# Patient Record
Sex: FEMALE | Race: WHITE | Employment: FULL TIME | URBAN - METROPOLITAN AREA
[De-identification: names, ages, dates, MRNs, and addresses within clinical notes are randomized per-mention and may not be internally consistent; named-entity substitution may affect disease eponyms.]

---

## 2018-04-23 ENCOUNTER — TELEPHONE (OUTPATIENT)
Dept: PAIN MANAGEMENT | Age: 48
End: 2018-04-23

## 2018-07-31 ENCOUNTER — TELEPHONE (OUTPATIENT)
Dept: PAIN MANAGEMENT | Age: 48
End: 2018-07-31

## 2018-07-31 NOTE — TELEPHONE ENCOUNTER
This patients referral has been approved for scheduling with [facility].  First message was left for the patient to call [facility] regarding their referral.

## 2018-08-06 NOTE — TELEPHONE ENCOUNTER
New appointment has been scheduled. Address was confirmed and a new patient packet along with appt reminder card was mailed to the patient. Informed patient the first appointment may only be rescheduled once and no showing or giving less than 24 hour notice of inability to come to first office visit will result in a cancellation of the referral. Advised that if they arrive to the appt without completed paperwork, their appointment may be rescheduled for the next available new patient spot. Also advised the pt to prepare to be in the office for at least 2-3 hours and that once established with one of our 2 providers, they may not switch to the other.

## 2018-09-13 ENCOUNTER — OFFICE VISIT (OUTPATIENT)
Dept: PAIN MANAGEMENT | Age: 48
End: 2018-09-13

## 2018-09-13 ENCOUNTER — HOSPITAL ENCOUNTER (OUTPATIENT)
Age: 48
Discharge: HOME | End: 2018-09-13
Payer: COMMERCIAL

## 2018-09-13 ENCOUNTER — HOSPITAL ENCOUNTER (OUTPATIENT)
Dept: GENERAL RADIOLOGY | Age: 48
Discharge: HOME | End: 2018-09-13
Payer: COMMERCIAL

## 2018-09-13 VITALS
SYSTOLIC BLOOD PRESSURE: 115 MMHG | BODY MASS INDEX: 36.73 KG/M2 | HEART RATE: 91 BPM | WEIGHT: 234 LBS | HEIGHT: 67 IN | DIASTOLIC BLOOD PRESSURE: 76 MMHG

## 2018-09-13 DIAGNOSIS — G89.4 CHRONIC PAIN SYNDROME: ICD-10-CM

## 2018-09-13 DIAGNOSIS — G89.29 ENCOUNTER FOR CHRONIC PAIN MANAGEMENT: ICD-10-CM

## 2018-09-13 DIAGNOSIS — M79.18 MYOFASCIAL PAIN: ICD-10-CM

## 2018-09-13 DIAGNOSIS — M96.1 FAILED BACK SURGICAL SYNDROME: ICD-10-CM

## 2018-09-13 PROCEDURE — 99244 OFF/OP CNSLTJ NEW/EST MOD 40: CPT | Performed by: INTERNAL MEDICINE

## 2018-09-13 PROCEDURE — 73502 X-RAY EXAM HIP UNI 2-3 VIEWS: CPT

## 2018-09-13 PROCEDURE — 73562 X-RAY EXAM OF KNEE 3: CPT

## 2018-09-13 RX ORDER — MELOXICAM 15 MG/1
15 TABLET ORAL DAILY
Qty: 30 TABLET | Refills: 0 | Status: SHIPPED | OUTPATIENT
Start: 2018-09-13 | End: 2018-09-13 | Stop reason: SDUPTHER

## 2018-09-13 RX ORDER — MELOXICAM 15 MG/1
15 TABLET ORAL DAILY
Qty: 30 TABLET | Refills: 0 | Status: SHIPPED | OUTPATIENT
Start: 2018-09-13 | End: 2018-10-07 | Stop reason: SDUPTHER

## 2018-09-13 RX ORDER — MELOXICAM 15 MG/1
15 TABLET ORAL DAILY
Qty: 30 TABLET | Refills: 0 | Status: SHIPPED | OUTPATIENT
Start: 2018-09-13 | End: 2018-09-13

## 2018-09-13 RX ORDER — NORTRIPTYLINE HYDROCHLORIDE 25 MG/1
25-50 CAPSULE ORAL NIGHTLY
Qty: 60 CAPSULE | Refills: 0 | Status: SHIPPED | OUTPATIENT
Start: 2018-09-13 | End: 2018-10-09 | Stop reason: SDUPTHER

## 2018-09-13 RX ORDER — TRAMADOL HYDROCHLORIDE 50 MG/1
50 TABLET ORAL EVERY 6 HOURS PRN
Qty: 50 TABLET | Refills: 0 | Status: SHIPPED | OUTPATIENT
Start: 2018-09-13 | End: 2018-12-13 | Stop reason: SDUPTHER

## 2018-09-15 DIAGNOSIS — M96.1 FAILED BACK SURGICAL SYNDROME: ICD-10-CM

## 2018-09-15 DIAGNOSIS — G89.29 ENCOUNTER FOR CHRONIC PAIN MANAGEMENT: ICD-10-CM

## 2018-09-15 DIAGNOSIS — M79.18 MYOFASCIAL PAIN: ICD-10-CM

## 2018-09-15 DIAGNOSIS — G89.4 CHRONIC PAIN SYNDROME: ICD-10-CM

## 2018-09-15 LAB
HCT VFR BLD CALC: 42.4 % (ref 36–48)
HEMOGLOBIN: 14.3 G/DL (ref 12–16)
MCH RBC QN AUTO: 31.5 PG (ref 26–34)
MCHC RBC AUTO-ENTMCNC: 33.7 G/DL (ref 31–36)
MCV RBC AUTO: 93.7 FL (ref 80–100)
PDW BLD-RTO: 13.7 % (ref 12.4–15.4)
PLATELET # BLD: 341 K/UL (ref 135–450)
PMV BLD AUTO: 8 FL (ref 5–10.5)
RBC # BLD: 4.53 M/UL (ref 4–5.2)
RHEUMATOID FACTOR: <10 IU/ML
SEDIMENTATION RATE, ERYTHROCYTE: 11 MM/HR (ref 0–30)
TSH SERPL DL<=0.05 MIU/L-ACNC: 0.75 UIU/ML (ref 0.27–4.2)
WBC # BLD: 6.9 K/UL (ref 4–11)

## 2018-09-16 NOTE — PROGRESS NOTES
history patient denies smoking alcohol abuse drug abuse denies marijuana use states she drinks socially currently working. Does complain a weakness of the lower extremity no numbness or tingling no instability or gait problems no history of falls ongoing pain symptoms have restricted social and recreational life. The patient's social history, past medical history, family history,  medications, allergies and review of systems have all been reviewed,  initialed and dated and are recorded on appropriate forms in the chart. These forms have been scanned into the \"media\" tab of patients electronic medical record  PHYSICAL EXAM:  Please see the physical exam form for a detailed examination on this visit. This form has been completed and scanned into the  Media section of the chart  This is a middle-aged female obese well-developed no apparent acute distress E into ×3 mood and affect is normal gait is normal gross motor coordination is normal .  Negative Tamara signs. Back and trunk exam shows scar from surgery tenderness paralumbar region range of motion is restricted pain on extension motor strength over 5 sensory exam is grossly unremarkable reflexes are +2 knees +1 at the ankles. Straight leg raising negative for root tension sign, femoral stretch and fanny' test is negative. Skin: Warm and dry. Good turgor. No rashes. No lesions or marks noted  Eyes:  PERRLA, cornea/ conjunctiva normal, no nystagmus  HENT:  Atraumatic, external ears normal, nose normal, oropharynx moist, no pharyngeal exudates. Neck- normal range of motion, no tenderness, supple. No bruit, no JVD, No lymph nodes appreciated.  Thyroid is not enlarged  Respiratory: Lungs CTAP, No respiratory distress, normal breath sounds, no rales, no wheezing   Cardiovascular:  Normal S1,S2, Normal rate, normal rhythm, no murmurs, no gallops, no rubs   GI:  Soft, nondistended, normal bowel sounds, nontender, no organomegaly, no mass, no rebound, no guarding, utilizing voice recognition software. Minor errors in transcription may be present.

## 2018-09-17 LAB
ANA INTERPRETATION: NORMAL
ANTI-NUCLEAR ANTIBODY (ANA): NEGATIVE

## 2018-09-26 ENCOUNTER — TELEPHONE (OUTPATIENT)
Dept: PAIN MANAGEMENT | Age: 48
End: 2018-09-26

## 2018-10-07 DIAGNOSIS — M79.18 MYOFASCIAL PAIN: ICD-10-CM

## 2018-10-07 DIAGNOSIS — G89.4 CHRONIC PAIN SYNDROME: ICD-10-CM

## 2018-10-07 DIAGNOSIS — M96.1 FAILED BACK SURGICAL SYNDROME: ICD-10-CM

## 2018-10-07 DIAGNOSIS — G89.29 ENCOUNTER FOR CHRONIC PAIN MANAGEMENT: ICD-10-CM

## 2018-10-08 RX ORDER — MELOXICAM 15 MG/1
TABLET ORAL
Qty: 30 TABLET | Refills: 0 | Status: SHIPPED | OUTPATIENT
Start: 2018-10-08 | End: 2019-02-14 | Stop reason: SDUPTHER

## 2018-10-09 DIAGNOSIS — M96.1 FAILED BACK SURGICAL SYNDROME: ICD-10-CM

## 2018-10-09 DIAGNOSIS — G89.4 CHRONIC PAIN SYNDROME: ICD-10-CM

## 2018-10-09 DIAGNOSIS — M79.18 MYOFASCIAL PAIN: ICD-10-CM

## 2018-10-09 DIAGNOSIS — G89.29 ENCOUNTER FOR CHRONIC PAIN MANAGEMENT: ICD-10-CM

## 2018-10-09 RX ORDER — NORTRIPTYLINE HYDROCHLORIDE 25 MG/1
CAPSULE ORAL
Qty: 60 CAPSULE | Refills: 1 | Status: SHIPPED | OUTPATIENT
Start: 2018-10-09 | End: 2018-12-13 | Stop reason: SINTOL

## 2018-10-09 RX ORDER — MELOXICAM 15 MG/1
TABLET ORAL
Qty: 30 TABLET | Refills: 1 | Status: SHIPPED | OUTPATIENT
Start: 2018-10-09 | End: 2018-12-13 | Stop reason: SDUPTHER

## 2018-12-07 RX ORDER — TRAMADOL HYDROCHLORIDE 50 MG/1
TABLET ORAL
Qty: 14 TABLET | Refills: 0 | OUTPATIENT
Start: 2018-12-07

## 2018-12-13 ENCOUNTER — OFFICE VISIT (OUTPATIENT)
Dept: PAIN MANAGEMENT | Age: 48
End: 2018-12-13
Payer: COMMERCIAL

## 2018-12-13 VITALS
WEIGHT: 234 LBS | DIASTOLIC BLOOD PRESSURE: 80 MMHG | BODY MASS INDEX: 36.65 KG/M2 | SYSTOLIC BLOOD PRESSURE: 129 MMHG | HEART RATE: 82 BPM

## 2018-12-13 DIAGNOSIS — M79.18 MYOFASCIAL PAIN: ICD-10-CM

## 2018-12-13 DIAGNOSIS — M96.1 FAILED BACK SURGICAL SYNDROME: ICD-10-CM

## 2018-12-13 DIAGNOSIS — M51.36 DDD (DEGENERATIVE DISC DISEASE), LUMBAR: ICD-10-CM

## 2018-12-13 DIAGNOSIS — G89.29 ENCOUNTER FOR CHRONIC PAIN MANAGEMENT: ICD-10-CM

## 2018-12-13 DIAGNOSIS — M47.816 FACET SYNDROME, LUMBAR: ICD-10-CM

## 2018-12-13 DIAGNOSIS — G89.4 CHRONIC PAIN SYNDROME: ICD-10-CM

## 2018-12-13 PROCEDURE — 99214 OFFICE O/P EST MOD 30 MIN: CPT | Performed by: INTERNAL MEDICINE

## 2018-12-13 RX ORDER — TRAMADOL HYDROCHLORIDE 50 MG/1
50 TABLET ORAL EVERY 6 HOURS PRN
Qty: 50 TABLET | Refills: 0 | Status: SHIPPED | OUTPATIENT
Start: 2018-12-13 | End: 2019-02-07

## 2018-12-13 RX ORDER — DESVENLAFAXINE 50 MG/1
50 TABLET, EXTENDED RELEASE ORAL DAILY
Qty: 30 TABLET | Refills: 3 | Status: SHIPPED | OUTPATIENT
Start: 2018-12-13 | End: 2019-02-14

## 2018-12-13 RX ORDER — MELOXICAM 15 MG/1
TABLET ORAL
Qty: 30 TABLET | Refills: 1 | Status: SHIPPED | OUTPATIENT
Start: 2018-12-13 | End: 2019-02-14 | Stop reason: SDUPTHER

## 2018-12-13 NOTE — PROGRESS NOTES
record have been accounted for, I am aware of the patient receiving these medications. [place] record will be rechecked as part of office protocol.   -She was advised weight reduction, diet changes- 800-1200 maricarmen diet, diet diary, exercising, nutritional  consult increased physical activity as tolerated  -Most recent labs were reviewed and are within normal limits. Will repeat them within next 9-12 months if there is no status change, negative RH factor  -xray's  Hips/knees are unremarkable   -continue with Mobic 1 daily PRN and Ultram 1 per day PRN  -d/c Pamelor and start Pristiq 50 mg to help with post menopausal symptoms and pain  -start Ztlido patches to the hips  -She was advised to increase fluids ( 5-7  glasses of fluid daily), limit caffeine, avoid cheese products, increase dietary fiber, increase activity and exercise as tolerated and relax regularly and enjoy meals     Ms. Shante Toledo will be prescribed  the medications  listed below which are for treatment of her presenting  medical problems which for this visit include:   Diagnoses of Failed back surgical syndrome, DDD (degenerative disc disease), lumbar, Myofascial pain, Facet syndrome, lumbar, Chronic pain syndrome, and Encounter for chronic pain management were pertinent to this visit. Medications/orders associated with this visit:    Current Outpatient Prescriptions   Medication Sig Dispense Refill    meloxicam (MOBIC) 15 MG tablet TAKE ONE TABLET BY MOUTH DAILY 30 tablet 1    traMADol (ULTRAM) 50 MG tablet Take 1 tablet by mouth every 6 hours as needed for Pain for up to 56 days. Max 1-2 per day. 50 tablet 0    desvenlafaxine succinate (PRISTIQ) 50 MG TB24 extended release tablet Take 1 tablet by mouth daily 30 tablet 3    Lidocaine (ZTLIDO) 1.8 % PTCH Apply 1 patch every 12 hours then off 12 hours 60 patch 0    meloxicam (MOBIC) 15 MG tablet TAKE ONE TABLET BY MOUTH DAILY 30 tablet 0     No current facility-administered medications for this visit.

## 2018-12-17 ENCOUNTER — TELEPHONE (OUTPATIENT)
Dept: PAIN MANAGEMENT | Age: 48
End: 2018-12-17

## 2019-02-14 ENCOUNTER — OFFICE VISIT (OUTPATIENT)
Dept: PAIN MANAGEMENT | Age: 49
End: 2019-02-14
Payer: COMMERCIAL

## 2019-02-14 VITALS
BODY MASS INDEX: 36.81 KG/M2 | DIASTOLIC BLOOD PRESSURE: 81 MMHG | SYSTOLIC BLOOD PRESSURE: 130 MMHG | HEART RATE: 78 BPM | WEIGHT: 235 LBS

## 2019-02-14 DIAGNOSIS — M96.1 FAILED BACK SURGICAL SYNDROME: ICD-10-CM

## 2019-02-14 DIAGNOSIS — M51.36 DDD (DEGENERATIVE DISC DISEASE), LUMBAR: ICD-10-CM

## 2019-02-14 DIAGNOSIS — G89.29 ENCOUNTER FOR CHRONIC PAIN MANAGEMENT: ICD-10-CM

## 2019-02-14 DIAGNOSIS — G89.4 CHRONIC PAIN SYNDROME: ICD-10-CM

## 2019-02-14 DIAGNOSIS — M47.816 FACET SYNDROME, LUMBAR: ICD-10-CM

## 2019-02-14 DIAGNOSIS — M79.18 MYOFASCIAL PAIN: ICD-10-CM

## 2019-02-14 PROCEDURE — 99213 OFFICE O/P EST LOW 20 MIN: CPT | Performed by: INTERNAL MEDICINE

## 2019-02-14 RX ORDER — TRAMADOL HYDROCHLORIDE 50 MG/1
50 TABLET ORAL EVERY 6 HOURS PRN
Qty: 56 TABLET | Refills: 0 | Status: SHIPPED | OUTPATIENT
Start: 2019-02-14 | End: 2019-04-11 | Stop reason: SDUPTHER

## 2019-02-14 RX ORDER — MELOXICAM 15 MG/1
TABLET ORAL
Qty: 30 TABLET | Refills: 1 | Status: SHIPPED | OUTPATIENT
Start: 2019-02-14 | End: 2019-05-09 | Stop reason: SDUPTHER

## 2019-02-25 ENCOUNTER — TELEPHONE (OUTPATIENT)
Dept: PAIN MANAGEMENT | Age: 49
End: 2019-02-25

## 2019-04-03 DIAGNOSIS — M96.1 FAILED BACK SURGICAL SYNDROME: ICD-10-CM

## 2019-04-03 LAB
A/G RATIO: 1.4 (ref 1.1–2.2)
ALBUMIN SERPL-MCNC: 4.1 G/DL (ref 3.4–5)
ALP BLD-CCNC: 69 U/L (ref 40–129)
ALT SERPL-CCNC: 15 U/L (ref 10–40)
ANION GAP SERPL CALCULATED.3IONS-SCNC: 12 MMOL/L (ref 3–16)
AST SERPL-CCNC: 14 U/L (ref 15–37)
BILIRUB SERPL-MCNC: 0.8 MG/DL (ref 0–1)
BUN BLDV-MCNC: 14 MG/DL (ref 7–20)
CALCIUM SERPL-MCNC: 9.3 MG/DL (ref 8.3–10.6)
CHLORIDE BLD-SCNC: 107 MMOL/L (ref 99–110)
CO2: 24 MMOL/L (ref 21–32)
CREAT SERPL-MCNC: 0.6 MG/DL (ref 0.6–1.1)
GFR AFRICAN AMERICAN: >60
GFR NON-AFRICAN AMERICAN: >60
GLOBULIN: 2.9 G/DL
GLUCOSE BLD-MCNC: 111 MG/DL (ref 70–99)
POTASSIUM SERPL-SCNC: 4.1 MMOL/L (ref 3.5–5.1)
SODIUM BLD-SCNC: 143 MMOL/L (ref 136–145)
TOTAL PROTEIN: 7 G/DL (ref 6.4–8.2)

## 2019-04-11 ENCOUNTER — OFFICE VISIT (OUTPATIENT)
Dept: PAIN MANAGEMENT | Age: 49
End: 2019-04-11
Payer: COMMERCIAL

## 2019-04-11 VITALS
DIASTOLIC BLOOD PRESSURE: 76 MMHG | SYSTOLIC BLOOD PRESSURE: 117 MMHG | HEART RATE: 83 BPM | WEIGHT: 233 LBS | BODY MASS INDEX: 36.49 KG/M2

## 2019-04-11 DIAGNOSIS — M51.36 DDD (DEGENERATIVE DISC DISEASE), LUMBAR: ICD-10-CM

## 2019-04-11 DIAGNOSIS — G89.4 CHRONIC PAIN SYNDROME: ICD-10-CM

## 2019-04-11 DIAGNOSIS — M47.816 FACET SYNDROME, LUMBAR: ICD-10-CM

## 2019-04-11 DIAGNOSIS — M96.1 FAILED BACK SURGICAL SYNDROME: ICD-10-CM

## 2019-04-11 DIAGNOSIS — M79.18 MYOFASCIAL PAIN: ICD-10-CM

## 2019-04-11 DIAGNOSIS — R10.84 GENERALIZED ABDOMINAL PAIN: ICD-10-CM

## 2019-04-11 PROCEDURE — 99214 OFFICE O/P EST MOD 30 MIN: CPT | Performed by: INTERNAL MEDICINE

## 2019-04-11 RX ORDER — METHYLPREDNISOLONE 4 MG/1
TABLET ORAL
Qty: 1 KIT | Refills: 0 | Status: SHIPPED | OUTPATIENT
Start: 2019-04-11 | End: 2019-05-09

## 2019-04-11 RX ORDER — TRAMADOL HYDROCHLORIDE 50 MG/1
50 TABLET ORAL EVERY 6 HOURS PRN
Qty: 50 TABLET | Refills: 0 | Status: SHIPPED | OUTPATIENT
Start: 2019-04-11 | End: 2019-05-09 | Stop reason: SDUPTHER

## 2019-04-11 NOTE — PROGRESS NOTES
Trixie Matty  1970  [id number]    HISTORY OF PRESENT ILLNESS:  Ms. Tomas Chavez is a 46 y.o. female returns for a follow up visit for multiple medical problems. Her current presenting problems are   1. Generalized abdominal pain    2. Chronic pain syndrome    3. DDD (degenerative disc disease), lumbar    4. Failed back surgical syndrome    5. Myofascial pain    6. Facet syndrome, lumbar    . As per information/history obtained from the PADT(patient assessment and documentation tool) - She complains of pain in the lower back. She rates the pain 8/10 and describes it as aching. Pain is made worse by: movement, sitting. Current treatment regimen has helped relieve about 40% of the pain. She denies side effects from the current pain regimen. Patient reports that since the last follow up visit the physical functioning is worse, family/social relationships are unchanged, mood is worse and sleep patterns are unchanged, and that the overall functioning is worse. Patient denies neurological bowel or bladder. Patient denies misusing/abusing her narcotic pain medications or using any illegal drugs. There are No indicators for possible drug abuse, addiction or diversion problems. Upon obtaining the medical history from Ms. oTmas Chavez regarding today's office visit for her presenting problems, patient states  \"I'm a mess\". She reports that her stomach and abdomen have been hurting and she went to the ER. Ms. Tomas Chavez complains of shooting pains in the low back after she tried to help her daughter move. Patient denies any constipation symptoms or urinary symptoms. She states that her abdominal pain is worse than the back pain. Patient reports that she is working full time still and sitting is painful. 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 Patient states her sleep is fair. Has fairly normal sleep latency. Averages about 4-6 hours of sleep a night. Denies any signs of sleep apnea. Feels somewhat rested in the morning. Patient's  subjective report of her mood is fair. she describes occasional symptoms of depression, occasional  irritability and some mood swings. Describes her mood as being neutral and reports some pleasure in her daily activities. Reports  fair  appetite, energy and concentration. Able to function well in different aspects of her daily activities. Denies suicidal or homicidal ideation. Denies any complaints of increased tension, does   Worry sometimes and occasional  Irritability she denies any c/o increased anxiety, No c/o panic attacks or symptoms of PTSD. ALLERGIES/PAST MED/FAM/SOC HISTORY: Ms. Amaris Pizano allergies, past medical, family and social history were reviewed in the chart and also listed below.   Social History     Socioeconomic History    Marital status:      Spouse name: Not on file    Number of children: Not on file    Years of education: Not on file    Highest education level: Not on file   Occupational History    Not on file   Social Needs    Financial resource strain: Not on file    Food insecurity:     Worry: Not on file     Inability: Not on file    Transportation needs:     Medical: Not on file     Non-medical: Not on file   Tobacco Use    Smoking status: Never Smoker    Smokeless tobacco: Never Used   Substance and Sexual Activity    Alcohol use: No    Drug use: No    Sexual activity: Not on file   Lifestyle    Physical activity:     Days per week: Not on file     Minutes per session: Not on file    Stress: Not on file   Relationships    Social connections:     Talks on phone: Not on file     Gets together: Not on file     Attends [employer]ism service: Not on file     Active member of club or organization: Not on file     Attends meetings of clubs or organizations: Not on file     Relationship status: Not on file    Intimate partner violence:     Fear of current or ex partner: Not on file     Emotionally abused: Not on file     Physically abused: Not on file     Forced sexual activity: Not on file   Other Topics Concern    Not on file   Social History Narrative    Not on file       Ms. Limmie Apgar current medications are   Outpatient Medications Prior to Visit   Medication Sig Dispense Refill    meloxicam (MOBIC) 15 MG tablet TAKE ONE TABLET BY MOUTH DAILY 30 tablet 1     No facility-administered medications prior to visit. REVIEW OF SYSTEMS:   Constitutional: Negative for fatigue and unexpected weight change. Eyes: Negative for visual disturbance. Respiratory: Negative for shortness of breath. Cardiovascular: Negative for chest pain, palpitations  Gastrointestinal: Negative for blood in stool, abdominal distention, nausea, vomiting, abdominal pain, diarrhea,constipation. Skin: Negative for color change or any abnormal bruising. Neurological: Negative for speech difficulty, weakness and light-headedness, dizziness, tremors, sleepiness  Psychiatric/Behavioral: Negative for suicidal ideas, hallucinations, behavioral problems, self-injury, decreased concentration/cognition, agitation, confusion. PHYSICAL EXAM:   Nursing note and vitals reviewed. /76   Pulse 83   Wt 233 lb (105.7 kg)   BMI 36.49 kg/m²   General Appearance: Patient is well nourished, well developed, well groomed and in no acute distress. Skin: Skin is warm and dry, good turgor . No rash or lesions noted. She is not diaphoretic. Pulmonary/Chest: Effort normal. No respiratory distress or use of accessory muscles. Auscultation revealing normal air entry. She does not have wheezes in the lung fields. She has no rales. Cardiovascular: Normal rate, regular rhythm, normal heart sounds, and does not have murmur. Exam reveals no gallop and no friction rub. Abdominal: Soft. Bowel sounds are normal.  On inspection of abdomen is obese no distension and no mass. No tenderness. She has no rebound and no guarding.      Musculoskeletal / Extremities: Range of motion is normal. Gait is normal, assistive devices use: none. She exhibits edema: none, and mild tenderness RUQ and RLQ. - rebound   Neurological/Psychiatric:She is alert and oriented to person, place, and time. Coordination is  normal.   Judgement and Insight is normal  Her mood is Appropriate and affect is Flat/blunted and Anxious . Her behavior is normal.   thought content normal.   Other: SLR - for root tension syndrome. >60 B/L     IMPRESSION:     1. Chronic pain syndrome    2. DDD (degenerative disc disease), lumbar    3. Failed back surgical syndrome    4. Myofascial pain    5. Facet syndrome, lumbar        PLAN:  Informed verbal consent was obtained. -Records from hospital reviewed. -Most recent labs were reviewed and are within normal limits. Will repeat them within next 9-12 months if there is no status change  -Continue with Ultram increased to 1-2.   -Continue with Mobic 15 mg.   -Check stool guiaic   -Follow up with GI/OBGYN.   -She was advised to increase fluids ( 5-7  glasses of fluid daily), limit caffeine, avoid cheese products, increase dietary fiber, increase activity and exercise as tolerated and relax regularly and enjoy meals  -Start Medrol pack to help with increased pain. she was advised proper sleep hygiene-told to avoid:use of caffeine or other stimulants after noon, alcohol use near bedtime, long or frequent naps during the day, erratic sleep schedule, heavy meals near bedtime, vigorous exercise near bedtime and use of electronic devices near bedtime     Ms. Karissa Sauceda will be prescribed  the medications  listed below which are for treatment of her presenting  medical problems which for this visit include:   Diagnoses of Chronic pain syndrome, DDD (degenerative disc disease), lumbar, Failed back surgical syndrome, Myofascial pain, and Facet syndrome, lumbar were pertinent to this visit.   Medications/orders associated with this visit:    Current Outpatient Medications   Medication Sig Dispense Refill    meloxicam medications were also answered. She was advised against drinking alcohol with the narcotic pain medicines, advised against driving or handling machinery when  starting or adjusting the dose of medicines, feeling groggy or drowsy, or if having any cognitive issues related to the current medications. Sheis fully aware of the risk of overdose and death, if medicines are misused and not taken as prescribed. If she develops new symptoms or if the symptoms worsen, she was told to call the office. .  Thank you for allowing me to participate in the care of this patient.     Sloan Hammonds MD.    Cc: Abhijit Richardson 15, Jayda Baum am scribing for and in the presence of Dr. Sloan Hammonds.   04/11/19  10:26 AM  Jayda Baum MA   I, Dr. Sloan Hammonds, personally performed the services described in this documentation as scribed by    Jayda Baum MA in my presence and it is both accurate and complete

## 2019-05-09 ENCOUNTER — OFFICE VISIT (OUTPATIENT)
Dept: PAIN MANAGEMENT | Age: 49
End: 2019-05-09
Payer: COMMERCIAL

## 2019-05-09 VITALS
DIASTOLIC BLOOD PRESSURE: 72 MMHG | SYSTOLIC BLOOD PRESSURE: 122 MMHG | HEIGHT: 67 IN | HEART RATE: 84 BPM | BODY MASS INDEX: 36.57 KG/M2 | WEIGHT: 233 LBS

## 2019-05-09 DIAGNOSIS — R10.84 GENERALIZED ABDOMINAL PAIN: ICD-10-CM

## 2019-05-09 DIAGNOSIS — M51.36 DDD (DEGENERATIVE DISC DISEASE), LUMBAR: ICD-10-CM

## 2019-05-09 DIAGNOSIS — G89.29 ENCOUNTER FOR CHRONIC PAIN MANAGEMENT: ICD-10-CM

## 2019-05-09 DIAGNOSIS — M79.18 MYOFASCIAL PAIN: ICD-10-CM

## 2019-05-09 DIAGNOSIS — M47.816 FACET SYNDROME, LUMBAR: ICD-10-CM

## 2019-05-09 DIAGNOSIS — M96.1 FAILED BACK SURGICAL SYNDROME: ICD-10-CM

## 2019-05-09 DIAGNOSIS — G89.4 CHRONIC PAIN SYNDROME: ICD-10-CM

## 2019-05-09 PROCEDURE — 99213 OFFICE O/P EST LOW 20 MIN: CPT | Performed by: INTERNAL MEDICINE

## 2019-05-09 RX ORDER — TRAMADOL HYDROCHLORIDE 50 MG/1
50 TABLET ORAL EVERY 6 HOURS PRN
Qty: 84 TABLET | Refills: 0 | Status: SHIPPED | OUTPATIENT
Start: 2019-05-09 | End: 2019-06-20 | Stop reason: SDUPTHER

## 2019-05-09 RX ORDER — MELOXICAM 15 MG/1
TABLET ORAL
Qty: 30 TABLET | Refills: 1 | Status: SHIPPED | OUTPATIENT
Start: 2019-05-09 | End: 2019-06-20 | Stop reason: SDUPTHER

## 2019-05-09 NOTE — PROGRESS NOTES
Kerry [facility]  1970  [id number]    HISTORY OF PRESENT ILLNESS:  Ms. Tavia Quiñones is a 46 y.o. female returns for a follow up visit for multiple medical problems. Her current presenting problems are   1. Chronic pain syndrome    2. Generalized abdominal pain    3. DDD (degenerative disc disease), lumbar    4. Failed back surgical syndrome    5. Myofascial pain    . As per information/history obtained from the PADT(patient assessment and documentation tool) - She complains of pain in the lower back with radiation to the buttocks, hips Bilateral, upper leg Bilateral, knees Bilateral, lower leg Bilateral, ankles Bilateral and feet Bilateral She rates the pain 5/10 and describes it as sharp, aching. Pain is made worse by: movement, walking, standing, sitting, bending, lifting. Current treatment regimen has helped relieve about 70% of the pain. She denies side effects from the current pain regimen. Patient reports that since the last follow up visit the physical functioning is worse, family/social relationships are unchanged, mood is unchanged and sleep patterns are unchanged, and that the overall functioning is worse. Patient denies neurological bowel or bladder. Patient denies misusing/abusing her narcotic pain medications or using any illegal drugs. There are No indicators for possible drug abuse, addiction or diversion problems. Upon obtaining the medical history from Ms. Tavia Quiñones regarding today's office visit for her presenting problems, Gal Suero states he is doing fair. She complains of pain in the back,legs and ankles have been hurting. She says she is working full time. She mentions she has been using Ultram along with Mobic. She denies any constipation symptoms. She reports her weight has been stable. Ms. Sonia Swain complains of pain in the hips also. ALLERGIES: Patients list of allergies were reviewed     MEDICATIONS: Ms. Tavia Quiñones list of medications were reviewed. Her current medications are   Outpatient Medications Prior to Visit   Medication Sig Dispense Refill    traMADol (ULTRAM) 50 MG tablet Take 1 tablet by mouth every 6 hours as needed for Pain (max 1-2) for up to 28 days. 50 tablet 0    meloxicam (MOBIC) 15 MG tablet TAKE ONE TABLET BY MOUTH DAILY 30 tablet 1    methylPREDNISolone (MEDROL, DORETHA,) 4 MG tablet Use as directed 1 kit 0     No facility-administered medications prior to visit. SOCIAL/FAMILY/PAST MEDICAL HISTORY: Ms. Raghav Bocanegra, family and past medical history was reviewed. REVIEW OF SYSTEMS:    Respiratory: Negative for apnea, chest tightness and shortness of breath or change in baseline breathing. Gastrointestinal: Negative for nausea, vomiting, abdominal pain, diarrhea, constipation, blood in stool and abdominal distention. PHYSICAL EXAM:   Nursing note and vitals reviewed. /72 (Site: Right Upper Arm, Position: Sitting, Cuff Size: Large Adult)   Pulse 84   Ht 5' 7\" (1.702 m)   Wt 233 lb (105.7 kg)   BMI 36.49 kg/m²   Constitutional: She appears well-developed and well-nourished. No acute distress. Skin: Skin is warm and dry, good turgor. No rash noted. She is not diaphoretic. Cardiovascular: Normal rate, regular rhythm, normal heart sounds, and does not have murmur. Pulmonary/Chest: Effort normal. No respiratory distress. She does not have wheezes in the lung fields. She has no rales. Neurological/Psychiatric:She is alert and oriented to person, place, and time. Coordination is  normal.  Her mood isAppropriate and affect is Neutral/Euthymic(normal) . IMPRESSION:   1. Chronic pain syndrome    2. Generalized abdominal pain    3. DDD (degenerative disc disease), lumbar    4. Failed back surgical syndrome    5.  Myofascial pain        PLAN:  Informed verbal consent was obtained  -MRI of Lumbar spine from 2018 reviewed   -Continue with current regimen   -Starting AEDs, or other adjuvants was discuss with patient, but she wants to wait   -Continue with and death, if medicines not taken as prescribed, were also discussed. If the patient develops new symptoms or if the symptoms worsen, the patient should call the office. While transcribing every attempt was made to maintain the accuracy of the note in terms of it's contents,there may have been some errors made inadvertently. Thank you for allowing me to participate in the care of this patient. Aniyah Riley MD.    Cc: Veena MCGOVERN I, Vivien Hawkins am scribing for and in the presence of Dr. Aniyah Riley.    05/09/19  10:06 AM  CARY Velásquez, Dr. Aniyah Riley, personally performed the services described in this documentation as scribed by   Vivien Hawkins MA in my presence and it is both accurate and complete

## 2019-06-20 ENCOUNTER — OFFICE VISIT (OUTPATIENT)
Dept: PAIN MANAGEMENT | Age: 49
End: 2019-06-20
Payer: COMMERCIAL

## 2019-06-20 VITALS
BODY MASS INDEX: 35.24 KG/M2 | WEIGHT: 225 LBS | HEART RATE: 88 BPM | SYSTOLIC BLOOD PRESSURE: 142 MMHG | DIASTOLIC BLOOD PRESSURE: 87 MMHG

## 2019-06-20 DIAGNOSIS — G89.29 ENCOUNTER FOR CHRONIC PAIN MANAGEMENT: ICD-10-CM

## 2019-06-20 DIAGNOSIS — G89.4 CHRONIC PAIN SYNDROME: ICD-10-CM

## 2019-06-20 DIAGNOSIS — M96.1 FAILED BACK SURGICAL SYNDROME: ICD-10-CM

## 2019-06-20 DIAGNOSIS — M51.36 DDD (DEGENERATIVE DISC DISEASE), LUMBAR: ICD-10-CM

## 2019-06-20 DIAGNOSIS — M79.18 MYOFASCIAL PAIN: ICD-10-CM

## 2019-06-20 DIAGNOSIS — R10.84 GENERALIZED ABDOMINAL PAIN: ICD-10-CM

## 2019-06-20 DIAGNOSIS — M47.816 FACET SYNDROME, LUMBAR: ICD-10-CM

## 2019-06-20 PROCEDURE — 99213 OFFICE O/P EST LOW 20 MIN: CPT | Performed by: INTERNAL MEDICINE

## 2019-06-20 RX ORDER — CHLORZOXAZONE 375 MG/1
375 TABLET ORAL 2 TIMES DAILY PRN
Qty: 30 TABLET | Refills: 0 | Status: SHIPPED | OUTPATIENT
Start: 2019-06-20 | End: 2019-07-26 | Stop reason: SDUPTHER

## 2019-06-20 RX ORDER — MELOXICAM 15 MG/1
TABLET ORAL
Qty: 30 TABLET | Refills: 1 | Status: SHIPPED | OUTPATIENT
Start: 2019-06-20 | End: 2019-07-26 | Stop reason: SDUPTHER

## 2019-06-20 RX ORDER — TRAMADOL HYDROCHLORIDE 50 MG/1
50 TABLET ORAL EVERY 6 HOURS PRN
Qty: 84 TABLET | Refills: 0 | Status: SHIPPED | OUTPATIENT
Start: 2019-06-20 | End: 2019-07-26 | Stop reason: SDUPTHER

## 2019-06-20 RX ORDER — CHLORZOXAZONE 375 MG/1
375 TABLET ORAL 2 TIMES DAILY PRN
Qty: 30 TABLET | Refills: 0 | Status: SHIPPED | OUTPATIENT
Start: 2019-06-20 | End: 2019-06-20

## 2019-06-20 NOTE — PROGRESS NOTES
Zulma Razo  1970  [id number]    HISTORY OF PRESENT ILLNESS:  Ms. Isidro Macario is a 46 y.o. female returns for a follow up visit for multiple medical problems. Her current presenting problems are   1. Chronic pain syndrome    2. Generalized abdominal pain    3. DDD (degenerative disc disease), lumbar    4. Failed back surgical syndrome    5. Myofascial pain    6. Facet syndrome, lumbar    . As per information/history obtained from the PADT(patient assessment and documentation tool) - She complains of pain in the neck and lower back with radiation to the buttocks and hips Bilateral She rates the pain 5/10 and describes it as aching, throbbing. Pain is made worse by: movement. Current treatment regimen has helped relieve about 10% of the pain. She denies side effects from the current pain regimen. Patient reports that since the last follow up visit the physical functioning is unchanged, family/social relationships are unchanged, mood is unchanged and sleep patterns are unchanged, and that the overall functioning is unchanged. Patient denies neurological bowel or bladder. Patient denies misusing/abusing her narcotic pain medications or using any illegal drugs. There are No indicators for possible drug abuse, addiction or diversion problems. Upon obtaining the medical history from Ms. Isidro Macario regarding today's office visit for her presenting problems, Ms. Isidro Macario states her neck has been hurting a lot. She states she is using Ultram along with the Mobic. She reports she is working full time but is off for the summer. She mentions she is using Ultram 1-2 per day. She says she had dental surgery and was given some opioids. ALLERGIES: Patients list of allergies were reviewed     MEDICATIONS: Ms. Isidro Macario list of medications were reviewed. Her current medications are   Outpatient Medications Prior to Visit   Medication Sig Dispense Refill    traMADol (ULTRAM) 50 MG tablet Take 1 tablet by mouth every 6 hours as prescribed, were also discussed. If the patient develops new symptoms or if the symptoms worsen, the patient should call the office. While transcribing every attempt was made to maintain the accuracy of the note in terms of it's contents,there may have been some errors made inadvertently. Thank you for allowing me to participate in the care of this patient. Marquita Neville MD.    Cc: Meet Maldonado, scribing for in the presence  of Dr. Marquita Neville.   06/20/19  11:48 AM  Dave Jane Assistant    I, Dr. Marquita Neville, personally performed the services described in this documentation as scribed by  Rashmi Esparza MA in my presence and it is both accurate and complete

## 2019-07-26 ENCOUNTER — OFFICE VISIT (OUTPATIENT)
Dept: PAIN MANAGEMENT | Age: 49
End: 2019-07-26
Payer: COMMERCIAL

## 2019-07-26 VITALS
SYSTOLIC BLOOD PRESSURE: 118 MMHG | HEART RATE: 87 BPM | BODY MASS INDEX: 35.08 KG/M2 | DIASTOLIC BLOOD PRESSURE: 66 MMHG | WEIGHT: 224 LBS

## 2019-07-26 DIAGNOSIS — M51.36 DDD (DEGENERATIVE DISC DISEASE), LUMBAR: ICD-10-CM

## 2019-07-26 DIAGNOSIS — M79.18 MYOFASCIAL PAIN: ICD-10-CM

## 2019-07-26 DIAGNOSIS — G89.4 CHRONIC PAIN SYNDROME: ICD-10-CM

## 2019-07-26 DIAGNOSIS — M47.816 FACET SYNDROME, LUMBAR: ICD-10-CM

## 2019-07-26 DIAGNOSIS — R10.84 GENERALIZED ABDOMINAL PAIN: ICD-10-CM

## 2019-07-26 DIAGNOSIS — M96.1 FAILED BACK SURGICAL SYNDROME: ICD-10-CM

## 2019-07-26 DIAGNOSIS — G89.29 ENCOUNTER FOR CHRONIC PAIN MANAGEMENT: ICD-10-CM

## 2019-07-26 PROCEDURE — 99213 OFFICE O/P EST LOW 20 MIN: CPT | Performed by: INTERNAL MEDICINE

## 2019-07-26 RX ORDER — CHLORZOXAZONE 375 MG/1
375 TABLET ORAL 2 TIMES DAILY PRN
Qty: 30 TABLET | Refills: 0 | Status: SHIPPED | OUTPATIENT
Start: 2019-07-26 | End: 2019-09-06

## 2019-07-26 RX ORDER — TRAMADOL HYDROCHLORIDE 50 MG/1
50 TABLET ORAL EVERY 6 HOURS PRN
Qty: 84 TABLET | Refills: 0 | Status: SHIPPED | OUTPATIENT
Start: 2019-07-26 | End: 2019-09-06 | Stop reason: SDUPTHER

## 2019-07-26 RX ORDER — MELOXICAM 15 MG/1
TABLET ORAL
Qty: 30 TABLET | Refills: 1 | Status: SHIPPED | OUTPATIENT
Start: 2019-07-26 | End: 2019-09-06 | Stop reason: SDUPTHER

## 2019-07-29 ENCOUNTER — TELEPHONE (OUTPATIENT)
Dept: PAIN MANAGEMENT | Age: 49
End: 2019-07-29

## 2019-07-29 DIAGNOSIS — M79.18 MYOFASCIAL PAIN: ICD-10-CM

## 2019-07-29 DIAGNOSIS — G89.4 CHRONIC PAIN SYNDROME: ICD-10-CM

## 2019-07-29 DIAGNOSIS — M96.1 FAILED BACK SURGICAL SYNDROME: ICD-10-CM

## 2019-07-29 DIAGNOSIS — M47.816 FACET SYNDROME, LUMBAR: ICD-10-CM

## 2019-07-29 DIAGNOSIS — M51.36 DDD (DEGENERATIVE DISC DISEASE), LUMBAR: ICD-10-CM

## 2019-09-06 ENCOUNTER — OFFICE VISIT (OUTPATIENT)
Dept: PAIN MANAGEMENT | Age: 49
End: 2019-09-06
Payer: COMMERCIAL

## 2019-09-06 VITALS
HEART RATE: 70 BPM | DIASTOLIC BLOOD PRESSURE: 74 MMHG | BODY MASS INDEX: 35.08 KG/M2 | WEIGHT: 224 LBS | SYSTOLIC BLOOD PRESSURE: 119 MMHG

## 2019-09-06 DIAGNOSIS — R10.84 GENERALIZED ABDOMINAL PAIN: ICD-10-CM

## 2019-09-06 DIAGNOSIS — M47.816 FACET SYNDROME, LUMBAR: ICD-10-CM

## 2019-09-06 DIAGNOSIS — M96.1 FAILED BACK SURGICAL SYNDROME: ICD-10-CM

## 2019-09-06 DIAGNOSIS — M79.18 MYOFASCIAL PAIN: ICD-10-CM

## 2019-09-06 DIAGNOSIS — M51.36 DDD (DEGENERATIVE DISC DISEASE), LUMBAR: ICD-10-CM

## 2019-09-06 DIAGNOSIS — G89.29 ENCOUNTER FOR CHRONIC PAIN MANAGEMENT: ICD-10-CM

## 2019-09-06 DIAGNOSIS — G89.4 CHRONIC PAIN SYNDROME: ICD-10-CM

## 2019-09-06 PROCEDURE — 99213 OFFICE O/P EST LOW 20 MIN: CPT | Performed by: INTERNAL MEDICINE

## 2019-09-06 RX ORDER — TRAMADOL HYDROCHLORIDE 50 MG/1
50 TABLET ORAL EVERY 6 HOURS PRN
Qty: 84 TABLET | Refills: 0 | Status: SHIPPED | OUTPATIENT
Start: 2019-09-06 | End: 2019-10-18 | Stop reason: SDUPTHER

## 2019-09-06 RX ORDER — MELOXICAM 15 MG/1
TABLET ORAL
Qty: 30 TABLET | Refills: 1 | Status: SHIPPED | OUTPATIENT
Start: 2019-09-06 | End: 2019-10-18 | Stop reason: ALTCHOICE

## 2019-09-06 NOTE — PROGRESS NOTES
status change     Current Outpatient Medications   Medication Sig Dispense Refill    traMADol (ULTRAM) 50 MG tablet Take 1 tablet by mouth every 6 hours as needed for Pain (max 1-2) for up to 42 days. 84 tablet 0    meloxicam (MOBIC) 15 MG tablet TAKE ONE TABLET BY MOUTH DAILY 30 tablet 1    chlorzoxazone (LORZONE) 375 MG TABS tablet Take 1 tablet by mouth 2 times daily as needed for Muscle spasms 30 tablet 0     No current facility-administered medications for this visit. I will continue her current medication regimen  which is part of the above treatment schedule. It has been helping with Ms. Sánchez's chronic  medical problems which for this visit include:   Diagnoses of Failed back surgical syndrome, Generalized abdominal pain, Encounter for chronic pain management, DDD (degenerative disc disease), lumbar, Myofascial pain, Facet syndrome, lumbar, and Chronic pain syndrome were pertinent to this visit. Risks and benefits of the medications and other alternative treatments  including no treatment were discussed with the patient. The common side effects of these medications were also explained to the patient. Informed verbal consent was obtained. Goals of current treatment regimen include improvement in pain, restoration of functioning- with focus on improvement in physical performance, general activity, work or disability,emotional distress, health care utilization and  decreased medication consumption. Will continue to monitor progress towards achieving/maintaining therapeutic goals with special emphasis on  1. Improvement in perceived interfernce  of pain with ADL's. Ability to do home exercises independently. Ability to do household chores indoor and/or outdoor work and social and leisure activities. Improve psychosocial and physical functioning. - she is showing progression towards this treatment goal with the current regimen.    2. Ability to focus/concentrate at work and perform the duties required

## 2019-10-09 DIAGNOSIS — M96.1 FAILED BACK SURGICAL SYNDROME: ICD-10-CM

## 2019-10-09 DIAGNOSIS — M79.18 MYOFASCIAL PAIN: ICD-10-CM

## 2019-10-09 DIAGNOSIS — M51.36 DDD (DEGENERATIVE DISC DISEASE), LUMBAR: ICD-10-CM

## 2019-10-09 DIAGNOSIS — G89.4 CHRONIC PAIN SYNDROME: ICD-10-CM

## 2019-10-09 DIAGNOSIS — M47.816 FACET SYNDROME, LUMBAR: ICD-10-CM

## 2019-10-18 ENCOUNTER — OFFICE VISIT (OUTPATIENT)
Dept: PAIN MANAGEMENT | Age: 49
End: 2019-10-18
Payer: COMMERCIAL

## 2019-10-18 VITALS
HEART RATE: 68 BPM | BODY MASS INDEX: 34.77 KG/M2 | DIASTOLIC BLOOD PRESSURE: 81 MMHG | SYSTOLIC BLOOD PRESSURE: 122 MMHG | WEIGHT: 222 LBS

## 2019-10-18 DIAGNOSIS — M51.36 DDD (DEGENERATIVE DISC DISEASE), LUMBAR: ICD-10-CM

## 2019-10-18 DIAGNOSIS — G89.4 CHRONIC PAIN SYNDROME: ICD-10-CM

## 2019-10-18 DIAGNOSIS — R10.84 GENERALIZED ABDOMINAL PAIN: ICD-10-CM

## 2019-10-18 DIAGNOSIS — M47.816 FACET SYNDROME, LUMBAR: ICD-10-CM

## 2019-10-18 DIAGNOSIS — M96.1 FAILED BACK SURGICAL SYNDROME: ICD-10-CM

## 2019-10-18 DIAGNOSIS — M79.18 MYOFASCIAL PAIN: ICD-10-CM

## 2019-10-18 PROCEDURE — 99214 OFFICE O/P EST MOD 30 MIN: CPT | Performed by: INTERNAL MEDICINE

## 2019-10-18 RX ORDER — CELECOXIB 200 MG/1
200 CAPSULE ORAL DAILY
Qty: 60 CAPSULE | Refills: 0 | Status: SHIPPED | OUTPATIENT
Start: 2019-10-18 | End: 2019-12-20 | Stop reason: SDUPTHER

## 2019-10-18 RX ORDER — TRAMADOL HYDROCHLORIDE 50 MG/1
50 TABLET ORAL EVERY 6 HOURS PRN
Qty: 84 TABLET | Refills: 0 | Status: SHIPPED | OUTPATIENT
Start: 2019-10-18 | End: 2019-11-22 | Stop reason: SDUPTHER

## 2019-10-18 RX ORDER — METHYLPREDNISOLONE 4 MG/1
TABLET ORAL
Qty: 1 KIT | Refills: 0 | Status: SHIPPED | OUTPATIENT
Start: 2019-10-18 | End: 2019-12-20

## 2019-10-18 RX ORDER — PREGABALIN 50 MG/1
CAPSULE ORAL
Qty: 90 CAPSULE | Refills: 0 | Status: SHIPPED | OUTPATIENT
Start: 2019-10-18 | End: 2019-12-20 | Stop reason: SDUPTHER

## 2019-10-18 RX ORDER — NORTRIPTYLINE HYDROCHLORIDE 25 MG/1
25-50 CAPSULE ORAL NIGHTLY
Qty: 60 CAPSULE | Refills: 3 | Status: SHIPPED | OUTPATIENT
Start: 2019-10-18 | End: 2020-03-13 | Stop reason: SDUPTHER

## 2019-10-24 RX ORDER — TRAMADOL HYDROCHLORIDE 50 MG/1
TABLET ORAL
Qty: 60 TABLET | Refills: 0 | OUTPATIENT
Start: 2019-10-24

## 2019-11-22 ENCOUNTER — OFFICE VISIT (OUTPATIENT)
Dept: PAIN MANAGEMENT | Age: 49
End: 2019-11-22
Payer: COMMERCIAL

## 2019-11-22 VITALS
DIASTOLIC BLOOD PRESSURE: 71 MMHG | BODY MASS INDEX: 33.99 KG/M2 | HEART RATE: 89 BPM | WEIGHT: 217 LBS | SYSTOLIC BLOOD PRESSURE: 132 MMHG

## 2019-11-22 DIAGNOSIS — G89.4 CHRONIC PAIN SYNDROME: ICD-10-CM

## 2019-11-22 DIAGNOSIS — M47.816 FACET SYNDROME, LUMBAR: ICD-10-CM

## 2019-11-22 DIAGNOSIS — M79.18 MYOFASCIAL PAIN: ICD-10-CM

## 2019-11-22 DIAGNOSIS — F51.01 PRIMARY INSOMNIA: ICD-10-CM

## 2019-11-22 DIAGNOSIS — M96.1 FAILED BACK SURGICAL SYNDROME: ICD-10-CM

## 2019-11-22 DIAGNOSIS — M51.36 DDD (DEGENERATIVE DISC DISEASE), LUMBAR: ICD-10-CM

## 2019-11-22 DIAGNOSIS — R10.84 GENERALIZED ABDOMINAL PAIN: ICD-10-CM

## 2019-11-22 PROCEDURE — 99214 OFFICE O/P EST MOD 30 MIN: CPT | Performed by: INTERNAL MEDICINE

## 2019-11-22 RX ORDER — TRAMADOL HYDROCHLORIDE 50 MG/1
50 TABLET ORAL EVERY 6 HOURS PRN
Qty: 56 TABLET | Refills: 0 | Status: SHIPPED | OUTPATIENT
Start: 2019-11-22 | End: 2019-12-20 | Stop reason: SDUPTHER

## 2019-11-22 RX ORDER — METHYLPREDNISOLONE 4 MG/1
TABLET ORAL
Qty: 1 KIT | Refills: 0 | Status: SHIPPED | OUTPATIENT
Start: 2019-11-22 | End: 2019-12-20

## 2019-12-20 ENCOUNTER — OFFICE VISIT (OUTPATIENT)
Dept: PAIN MANAGEMENT | Age: 49
End: 2019-12-20
Payer: COMMERCIAL

## 2019-12-20 VITALS
SYSTOLIC BLOOD PRESSURE: 126 MMHG | WEIGHT: 215 LBS | DIASTOLIC BLOOD PRESSURE: 84 MMHG | BODY MASS INDEX: 33.67 KG/M2 | HEART RATE: 104 BPM

## 2019-12-20 DIAGNOSIS — M79.18 MYOFASCIAL PAIN: ICD-10-CM

## 2019-12-20 DIAGNOSIS — R10.84 GENERALIZED ABDOMINAL PAIN: ICD-10-CM

## 2019-12-20 DIAGNOSIS — M51.36 DDD (DEGENERATIVE DISC DISEASE), LUMBAR: ICD-10-CM

## 2019-12-20 DIAGNOSIS — M96.1 FAILED BACK SURGICAL SYNDROME: ICD-10-CM

## 2019-12-20 DIAGNOSIS — M47.816 FACET SYNDROME, LUMBAR: ICD-10-CM

## 2019-12-20 DIAGNOSIS — G89.4 CHRONIC PAIN SYNDROME: ICD-10-CM

## 2019-12-20 PROCEDURE — 99213 OFFICE O/P EST LOW 20 MIN: CPT | Performed by: INTERNAL MEDICINE

## 2019-12-20 RX ORDER — TRAMADOL HYDROCHLORIDE 50 MG/1
50 TABLET ORAL EVERY 6 HOURS PRN
Qty: 84 TABLET | Refills: 0 | Status: SHIPPED | OUTPATIENT
Start: 2019-12-20 | End: 2020-01-31 | Stop reason: SDUPTHER

## 2019-12-20 RX ORDER — CELECOXIB 200 MG/1
200 CAPSULE ORAL DAILY
Qty: 60 CAPSULE | Refills: 1 | Status: SHIPPED | OUTPATIENT
Start: 2019-12-20 | End: 2020-01-31 | Stop reason: SDUPTHER

## 2019-12-20 RX ORDER — PREGABALIN 50 MG/1
CAPSULE ORAL
Qty: 90 CAPSULE | Refills: 0 | Status: SHIPPED | OUTPATIENT
Start: 2019-12-20 | End: 2020-03-13 | Stop reason: SDUPTHER

## 2020-01-31 ENCOUNTER — OFFICE VISIT (OUTPATIENT)
Dept: PAIN MANAGEMENT | Age: 50
End: 2020-01-31
Payer: COMMERCIAL

## 2020-01-31 VITALS
BODY MASS INDEX: 33.67 KG/M2 | SYSTOLIC BLOOD PRESSURE: 124 MMHG | HEART RATE: 107 BPM | WEIGHT: 215 LBS | DIASTOLIC BLOOD PRESSURE: 82 MMHG

## 2020-01-31 PROCEDURE — 99213 OFFICE O/P EST LOW 20 MIN: CPT | Performed by: INTERNAL MEDICINE

## 2020-01-31 RX ORDER — TRAMADOL HYDROCHLORIDE 50 MG/1
50 TABLET ORAL EVERY 6 HOURS PRN
Qty: 84 TABLET | Refills: 0 | Status: SHIPPED | OUTPATIENT
Start: 2020-01-31 | End: 2020-03-13 | Stop reason: SDUPTHER

## 2020-01-31 RX ORDER — CELECOXIB 200 MG/1
200 CAPSULE ORAL DAILY
Qty: 60 CAPSULE | Refills: 1 | Status: SHIPPED | OUTPATIENT
Start: 2020-01-31 | End: 2020-03-13 | Stop reason: SDUPTHER

## 2020-01-31 NOTE — PROGRESS NOTES
Luz Elena Pate  1970  [id number]      HISTORY OF PRESENT ILLNESS:  Ms. Freddi Homans is a 44 y.o. female returns for a follow up visit for pain management  She has a diagnosis of   1. Chronic pain syndrome    2. Failed back surgical syndrome    3. Generalized abdominal pain    4. Facet syndrome, lumbar    5. Myofascial pain    6. DDD (degenerative disc disease), lumbar    7. Primary insomnia    . She complains of pain in the lower back, left knee, left hip, left leg She rates the pain 4/10 and describes it as aching, burning. Current treatment regimen has helped relieve about 40% of the pain. She denies any side effects from the current pain regimen. Patient reports that since the last follow up visit the physical functioning is unchanged, family/social relationships are unchanged, mood is unchanged sleep patterns are better, and that the overall functioning is unchanged. Patient denies misusing/abusing her narcotic pain medications or using any illegal drugs. There are No indicators for possible drug abuse, addiction or diversion problems. Ms. Freddi Homans states she has aches/pain but managing with the regimen. She reports she is working full time still, she is working 2 jobs. Patient denies any cognitive side effects with the medications. Patient reports her weight has been stable. She mentions she is using Lyrica and Celebrex. ALLERGIES: Patients list of allergies were reviewed     MEDICATIONS: Ms. Freddi Homans list of medications were reviewed. Her current medications are   Outpatient Medications Prior to Visit   Medication Sig Dispense Refill    traMADol (ULTRAM) 50 MG tablet Take 1 tablet by mouth every 6 hours as needed for Pain (max 1-2) for up to 42 days.  84 tablet 0    celecoxib (CELEBREX) 200 MG capsule Take 1 capsule by mouth daily 60 capsule 1    nortriptyline (PAMELOR) 25 MG capsule Take 1-2 capsules by mouth nightly 60 capsule 3    pregabalin (LYRICA) 50 MG capsule 1 tab am 2 tabs pm 90 capsule 0 needed for Pain (max 1-2) for up to 42 days. 84 tablet 0    celecoxib (CELEBREX) 200 MG capsule Take 1 capsule by mouth daily 60 capsule 1    nortriptyline (PAMELOR) 25 MG capsule Take 1-2 capsules by mouth nightly 60 capsule 3    pregabalin (LYRICA) 50 MG capsule 1 tab am 2 tabs pm 90 capsule 0     No current facility-administered medications for this visit. I will continue her current medication regimen  which is part of the above treatment schedule. It has been helping with Ms. Sánchez's chronic  medical problems which for this visit include:   Diagnoses of Chronic pain syndrome, Failed back surgical syndrome, Generalized abdominal pain, Facet syndrome, lumbar, Myofascial pain, DDD (degenerative disc disease), lumbar, and Primary insomnia were pertinent to this visit. Risks and benefits of the medications and other alternative treatments  including no treatment were discussed with the patient. The common side effects of these medications were also explained to the patient. Informed verbal consent was obtained. Goals of current treatment regimen include improvement in pain, restoration of functioning- with focus on improvement in physical performance, general activity, work or disability,emotional distress, health care utilization and  decreased medication consumption. Will continue to monitor progress towards achieving/maintaining therapeutic goals with special emphasis on  1. Improvement in perceived interfernce  of pain with ADL's. Ability to do home exercises independently. Ability to do household chores indoor and/or outdoor work and social and leisure activities. Improve psychosocial and physical functioning. - she is showing progression towards this treatment goal with the current regimen. 2. Ability to focus/concentrate at work and perform the duties required of her at work  Sit through a workday without lower extremity symptoms. Stand 30-60 minutes without lower extremity symptoms.   Ability to lift up to 10-20 lbs. Ability to go up and down stairs. Sit 30-60 minutes  Without having to stand up frequently. - she is maintaining/progressing towards her work related goals with the current regimen. She was advised against drinking alcohol with the narcotic pain medicines, advised against driving or handling machinery while adjusting the dose of medicines or if having cognitive  issues related to the current medications. Risk of overdose and death, if medicines not taken as prescribed, were also discussed. If the patient develops new symptoms or if the symptoms worsen, the patient should call the office. While transcribing every attempt was made to maintain the accuracy of the note in terms of it's contents,there may have been some errors made inadvertently. Thank you for allowing me to participate in the care of this patient.     Joann Srinivasan MD.    Cc: Janett Rodriguez

## 2020-03-13 ENCOUNTER — OFFICE VISIT (OUTPATIENT)
Dept: PAIN MANAGEMENT | Age: 50
End: 2020-03-13
Payer: COMMERCIAL

## 2020-03-13 VITALS
BODY MASS INDEX: 33.67 KG/M2 | WEIGHT: 215 LBS | SYSTOLIC BLOOD PRESSURE: 131 MMHG | HEART RATE: 87 BPM | DIASTOLIC BLOOD PRESSURE: 86 MMHG

## 2020-03-13 PROCEDURE — 99213 OFFICE O/P EST LOW 20 MIN: CPT | Performed by: INTERNAL MEDICINE

## 2020-03-13 RX ORDER — PREGABALIN 50 MG/1
CAPSULE ORAL
Qty: 90 CAPSULE | Refills: 1 | Status: SHIPPED | OUTPATIENT
Start: 2020-03-13 | End: 2020-04-23 | Stop reason: SDUPTHER

## 2020-03-13 RX ORDER — NORTRIPTYLINE HYDROCHLORIDE 25 MG/1
25-50 CAPSULE ORAL NIGHTLY
Qty: 60 CAPSULE | Refills: 1 | Status: SHIPPED | OUTPATIENT
Start: 2020-03-13 | End: 2020-04-23 | Stop reason: SDUPTHER

## 2020-03-13 RX ORDER — CELECOXIB 200 MG/1
200 CAPSULE ORAL DAILY
Qty: 60 CAPSULE | Refills: 1 | Status: SHIPPED | OUTPATIENT
Start: 2020-03-13 | End: 2020-05-22 | Stop reason: SDUPTHER

## 2020-03-13 RX ORDER — TRAMADOL HYDROCHLORIDE 50 MG/1
50 TABLET ORAL EVERY 6 HOURS PRN
Qty: 84 TABLET | Refills: 0 | Status: SHIPPED | OUTPATIENT
Start: 2020-03-13 | End: 2020-04-23 | Stop reason: SDUPTHER

## 2020-03-13 NOTE — PROGRESS NOTES
Ann Marie Urena  1970  [id number]      HISTORY OF PRESENT ILLNESS:  Ms. Sydney Mckay is a 44 y.o. female returns for a follow up visit for pain management  She has a diagnosis of   1. Chronic pain syndrome    2. Failed back surgical syndrome    3. Generalized abdominal pain    4. Facet syndrome, lumbar    5. Myofascial pain    6. DDD (degenerative disc disease), lumbar    7. Primary insomnia    . She complains of pain in the lower back, right knee She rates the pain 4/10 and describes it as aching. Current treatment regimen has helped relieve about 40% of the pain. She denies any side effects from the current pain regimen. Patient reports that since the last follow up visit the physical functioning is unchanged, family/social relationships are unchanged, mood is unchanged sleep patterns are unchanged, and that the overall functioning is unchanged. Patient denies misusing/abusing her narcotic pain medications or using any illegal drugs. There are No indicators for possible drug abuse, addiction or diversion problems. Ms. Sydney Mckay states she has been doing fair. She reports she is working full time still. Patient denies any constipation symptoms. She mentions she is using Ultram 1-2 per day along with the other adjuvants. Patient reports her back hurts worse than her legs, has occasional pain in right leg. Patient reports her weight has been stable. ALLERGIES: Patients list of allergies were reviewed     MEDICATIONS: Ms. Sydney Mckay list of medications were reviewed. Her current medications are   Outpatient Medications Prior to Visit   Medication Sig Dispense Refill    traMADol (ULTRAM) 50 MG tablet Take 1 tablet by mouth every 6 hours as needed for Pain (max 1-2) for up to 42 days.  84 tablet 0    celecoxib (CELEBREX) 200 MG capsule Take 1 capsule by mouth daily 60 capsule 1    nortriptyline (PAMELOR) 25 MG capsule Take 1-2 capsules by mouth nightly 60 capsule 3    pregabalin (LYRICA) 50 MG capsule 1 tab am 2 tabs pm 90 capsule 0     No facility-administered medications prior to visit. SOCIAL/FAMILY/PAST MEDICAL HISTORY: Ms. Bebe Morelos, family and past medical history was reviewed. REVIEW OF SYSTEMS:    Respiratory: Negative for apnea, chest tightness and shortness of breath or change in baseline breathing. Gastrointestinal: Negative for nausea, vomiting, abdominal pain, diarrhea, constipation, blood in stool and abdominal distention. PHYSICAL EXAM:   Nursing note and vitals reviewed. /86   Pulse 87   Wt 215 lb (97.5 kg)   BMI 33.67 kg/m²   Constitutional: She appears well-developed and well-nourished. No acute distress. Skin: Skin is warm and dry, good turgor. No rash noted. She is not diaphoretic. Cardiovascular: Normal rate, regular rhythm, normal heart sounds, and does not have murmur. Pulmonary/Chest: Effort normal. No respiratory distress. She does not have wheezes in the lung fields. She has no rales. Neurological/Psychiatric:She is alert and oriented to person, place, and time. Coordination is  normal.  Her mood isAppropriate and affect is Neutral/Euthymic(normal) . IMPRESSION:   1. Chronic pain syndrome    2. Failed back surgical syndrome    3. Generalized abdominal pain    4. Facet syndrome, lumbar    5. Myofascial pain    6.  DDD (degenerative disc disease), lumbar        PLAN:  Informed verbal consent was obtained  -continue with current opioid regimen   -back stretching exercises as advised   -She was advised to increase fluids ( 5-7  glasses of fluid daily), limit caffeine, avoid cheese products, increase dietary fiber, increase activity and exercise as tolerated and relax regularly and enjoy meals    -She was advised weight reduction, diet changes- 800-1200 maricarmen diet, diet diary, exercising, nutritional  consult increased physical activity as tolerated   Current Outpatient Medications   Medication Sig Dispense Refill    traMADol (ULTRAM) 50 MG tablet Take 1

## 2020-04-23 ENCOUNTER — VIRTUAL VISIT (OUTPATIENT)
Dept: PAIN MANAGEMENT | Age: 50
End: 2020-04-23
Payer: COMMERCIAL

## 2020-04-23 PROCEDURE — 99213 OFFICE O/P EST LOW 20 MIN: CPT | Performed by: INTERNAL MEDICINE

## 2020-04-23 RX ORDER — NORTRIPTYLINE HYDROCHLORIDE 25 MG/1
25-50 CAPSULE ORAL NIGHTLY
Qty: 60 CAPSULE | Refills: 1 | Status: SHIPPED | OUTPATIENT
Start: 2020-04-23 | End: 2020-05-22 | Stop reason: SDUPTHER

## 2020-04-23 RX ORDER — TRAMADOL HYDROCHLORIDE 50 MG/1
50 TABLET ORAL EVERY 6 HOURS PRN
Qty: 84 TABLET | Refills: 0 | Status: SHIPPED | OUTPATIENT
Start: 2020-04-23 | End: 2020-05-22 | Stop reason: ALTCHOICE

## 2020-04-23 RX ORDER — PREGABALIN 50 MG/1
CAPSULE ORAL
Qty: 90 CAPSULE | Refills: 1 | Status: SHIPPED | OUTPATIENT
Start: 2020-04-23 | End: 2020-05-22 | Stop reason: SDUPTHER

## 2020-04-23 NOTE — PROGRESS NOTES
TELE HEALTH VISIT (AUDIO-VISUAL)    Pursuant to the emergency declaration under the [facility] waiver authority and the [employer] Act, this Virtual  Visit was conducted, with patient's/legal guardian's consent, to reduce the patient's risk of exposure to COVID-19 and provide continuity of care for an established patient. Service is  provided through a video synchronous discussion virtually to substitute for in-person clinic visit. Due to this being a TeleHealth encounter (During WQFSL-16 public health emergency), evaluation of the following organ systems was limited: Vitals/Constitutional/EENT/Resp/CV/GI//MS/Neuro/Skin/Kusp-Snmg-Uym. Hugo Lundberg  1970  [id number]    Ms. Marcelino Wong is being seen virtually for a follow up visit using one of the following techniques-Doxy. me/[employer] Video visit/[employer] Du[employer] or Face time. Informed verbal consent to the virtual visit was obtained from Ms. Marcelino Wong. Risks associated with HIPPA compliance with the virtual visit was explained to the patient. Ms. Marcelino Wong is at her residence and Dr. Ramona Alvarado is in his office. HISTORY OF PRESENT ILLNESS:  Ms. Marcelino Wong is a 44 y.o. female  being assessed for a follow up visit for pain management for evaluation of ongoing care regarding her symptoms and monitoring of compliance with long term use high risk medications. She has a diagnosis of   1. Chronic pain syndrome    2. Failed back surgical syndrome    3. Generalized abdominal pain    4. Facet syndrome, lumbar    5. Myofascial pain    6. DDD (degenerative disc disease), lumbar    . She complains of pain in the Low back   with radiation to the buttocks and hips Right . She rates the pain 3/10 and describes it as sharp, aching. Current treatment regimen has helped relieve about 50% of the pain. She denies any side effects from the current pain regimen.  Patient reports that since the

## 2020-05-22 ENCOUNTER — VIRTUAL VISIT (OUTPATIENT)
Dept: PAIN MANAGEMENT | Age: 50
End: 2020-05-22
Payer: COMMERCIAL

## 2020-05-22 PROCEDURE — 99214 OFFICE O/P EST MOD 30 MIN: CPT | Performed by: INTERNAL MEDICINE

## 2020-05-22 RX ORDER — HYDROCODONE BITARTRATE AND ACETAMINOPHEN 5; 325 MG/1; MG/1
1 TABLET ORAL EVERY 6 HOURS PRN
Qty: 84 TABLET | Refills: 0 | Status: SHIPPED | OUTPATIENT
Start: 2020-05-22 | End: 2020-07-02 | Stop reason: SDUPTHER

## 2020-05-22 RX ORDER — PREGABALIN 50 MG/1
CAPSULE ORAL
Qty: 90 CAPSULE | Refills: 1 | Status: SHIPPED | OUTPATIENT
Start: 2020-05-22 | End: 2020-07-02 | Stop reason: SDUPTHER

## 2020-05-22 RX ORDER — METHYLPREDNISOLONE 4 MG/1
TABLET ORAL
Qty: 1 KIT | Refills: 0 | Status: SHIPPED | OUTPATIENT
Start: 2020-05-22 | End: 2020-07-02 | Stop reason: ALTCHOICE

## 2020-05-22 RX ORDER — NORTRIPTYLINE HYDROCHLORIDE 25 MG/1
25-50 CAPSULE ORAL NIGHTLY
Qty: 60 CAPSULE | Refills: 1 | Status: SHIPPED | OUTPATIENT
Start: 2020-05-22 | End: 2020-07-02 | Stop reason: SDUPTHER

## 2020-05-22 RX ORDER — CELECOXIB 200 MG/1
200 CAPSULE ORAL DAILY
Qty: 60 CAPSULE | Refills: 1 | Status: SHIPPED | OUTPATIENT
Start: 2020-05-22 | End: 2020-07-02 | Stop reason: SDUPTHER

## 2020-05-22 NOTE — PROGRESS NOTES
physical performance, general activity, work or disability,emotional distress, health care utilization and  decreased medication consumption. Will continue to monitor progress towards achieving/maintaining therapeutic goals with special emphasis on  1. Improvement in perceived interfernce  of pain with ADL's. Ability to do home exercises independently. Ability to do household chores indoor and/or outdoor work and social and leisure activities. Improve psychosocial and physical functioning. - she is not showing any significant progress/or showing regression  towards this goal and reassessment and adjustment of goals/treatment have been made. 2. Improving sleep to 6-7 hours a night. Improve mood/ anxiety and depression symptoms such as crying spells, low energy, problems with concentration, motivation. - she is showing progression towards this treatment goal with the current regimen. 3. Reduction of reliance on opioid analgesia/more appropriate opioid use. - she is showing progression towards this treatment goal with the current regimen. 4. Ability to focus/concentrate at work and perform the duties required of her at work  Sit through a workday without lower extremity symptoms. Stand 30-60 minutes without lower extremity symptoms. Ability to lift up to 10-20 lbs. Ability to go up and down stairs. Sit 30-60 minutes  Without having to stand up frequently. - she is maintaining/progressing towards her work related goals with the current regimen. She was advised against drinking alcohol with the narcotic pain medicines, advised against driving or handling machinery while adjusting the dose of medicines or if having cognitive  issues related to the current medications. Risk of overdose and death, if medicines not taken as prescribed, were also discussed. If the patient develops new symptoms or if the symptoms worsen, the patient should call the office.     While transcribing every attempt was made to maintain the

## 2020-07-02 ENCOUNTER — VIRTUAL VISIT (OUTPATIENT)
Dept: PAIN MANAGEMENT | Age: 50
End: 2020-07-02
Payer: COMMERCIAL

## 2020-07-02 PROCEDURE — 99214 OFFICE O/P EST MOD 30 MIN: CPT | Performed by: INTERNAL MEDICINE

## 2020-07-02 RX ORDER — TRAMADOL HYDROCHLORIDE 50 MG/1
50 TABLET ORAL EVERY 6 HOURS PRN
Qty: 84 TABLET | Refills: 0 | Status: SHIPPED | OUTPATIENT
Start: 2020-07-02 | End: 2020-07-30 | Stop reason: ALTCHOICE

## 2020-07-02 RX ORDER — HYDROCODONE BITARTRATE AND ACETAMINOPHEN 5; 325 MG/1; MG/1
1 TABLET ORAL EVERY 6 HOURS PRN
Qty: 84 TABLET | Refills: 0 | Status: SHIPPED | OUTPATIENT
Start: 2020-07-02 | End: 2020-09-25 | Stop reason: ALTCHOICE

## 2020-07-02 RX ORDER — NORTRIPTYLINE HYDROCHLORIDE 25 MG/1
25-50 CAPSULE ORAL NIGHTLY
Qty: 60 CAPSULE | Refills: 1 | Status: SHIPPED | OUTPATIENT
Start: 2020-07-02 | End: 2020-08-13 | Stop reason: SDUPTHER

## 2020-07-02 RX ORDER — PREGABALIN 50 MG/1
CAPSULE ORAL
Qty: 90 CAPSULE | Refills: 1 | Status: SHIPPED | OUTPATIENT
Start: 2020-07-02 | End: 2020-09-25 | Stop reason: SDUPTHER

## 2020-07-02 RX ORDER — CELECOXIB 200 MG/1
200 CAPSULE ORAL DAILY
Qty: 60 CAPSULE | Refills: 1 | Status: SHIPPED | OUTPATIENT
Start: 2020-07-02 | End: 2020-09-25 | Stop reason: SDUPTHER

## 2020-07-02 NOTE — PROGRESS NOTES
TELE HEALTH VISIT (AUDIO-VISUAL)    Pursuant to the emergency declaration under the [facility] waiver authority and the [employer] and [employer] Act, this Virtual  Visit was conducted, with patient's/legal guardian's consent, to reduce the patient's risk of exposure to COVID-19 and provide continuity of care for an established patient. Service is  provided through a video synchronous discussion virtually to substitute for in-person clinic visit. Due to this being a TeleHealth encounter (During MXCGK-91 public health emergency), evaluation of the following organ systems was limited: Vitals/Constitutional/EENT/Resp/CV/GI//MS/Neuro/Skin/Fysm-Efoe-Tdk. Marco Antonio Devias  1970  [id number]    Ms. Robin Barrera is being seen virtually for a follow up visit using one of the following techniques   Iphone, [employer]  Informed verbal consent to the virtual visit was obtained from Ms. Robin Barrera. Risks associated with HIPPA compliance with the virtual visit was explained to the patient. Ms. Robin Barrera is at her residence and Dr. Mary Quigley is in his office. HISTORY OF PRESENT ILLNESS:  Ms. Robin Barrera is a 44 y.o. female  being assessed for a follow up visit for pain management for evaluation of ongoing care regarding her symptoms and monitoring of compliance with long term use high risk medications. She has a diagnosis of   1. Chronic pain syndrome    2. Facet syndrome, lumbar    3. Generalized abdominal pain    4. Myofascial pain    5. Failed back surgical syndrome    6. DDD (degenerative disc disease), lumbar    . As per information/history obtained from the PADT(patient assessment and documentation tool) -  She complains of pain in the lower back with radiation to the buttocks, hips Right, upper leg Right and knees Right She rates the pain 7/10 and describes it as sharp, aching. Pain is made worse by: movement, walking, standing.   Current treatment education level: Not on file   Occupational History    Not on file   Social Needs    Financial resource strain: Not on file    Food insecurity     Worry: Not on file     Inability: Not on file    Transportation needs     Medical: Not on file     Non-medical: Not on file   Tobacco Use    Smoking status: Never Smoker    Smokeless tobacco: Never Used   Substance and Sexual Activity    Alcohol use: No    Drug use: No    Sexual activity: Not on file   Lifestyle    Physical activity     Days per week: Not on file     Minutes per session: Not on file    Stress: Not on file   Relationships    Social connections     Talks on phone: Not on file     Gets together: Not on file     Attends Jewish service: Not on file     Active member of club or organization: Not on file     Attends meetings of clubs or organizations: Not on file     Relationship status: Not on file    Intimate partner violence     Fear of current or ex partner: Not on file     Emotionally abused: Not on file     Physically abused: Not on file     Forced sexual activity: Not on file   Other Topics Concern    Not on file   Social History Narrative    Not on file       Ms. Obey Olguin current medications are   Outpatient Medications Prior to Visit   Medication Sig Dispense Refill    nortriptyline (PAMELOR) 25 MG capsule Take 1-2 capsules by mouth nightly 60 capsule 1    celecoxib (CELEBREX) 200 MG capsule Take 1 capsule by mouth daily 60 capsule 1    pregabalin (LYRICA) 50 MG capsule 1 tab am 2 tabs pm 90 capsule 1    methylPREDNISolone (MEDROL, DORETHA,) 4 MG tablet Use as directed 1 kit 0     No facility-administered medications prior to visit. REVIEW OF SYSTEMS:   Constitutional: Negative for fatigue and unexpected weight change. Eyes: Negative for visual disturbance. Respiratory: Negative for shortness of breath.     Cardiovascular: Negative for chest pain, palpitations  Gastrointestinal: Negative for blood in stool, abdominal distention, nausea, vomiting, abdominal pain, diarrhea,constipation. Skin: Negative for color change or any abnormal bruising. Neurological: Negative for speech difficulty, weakness and light-headedness, dizziness, tremors, sleepiness  Psychiatric/Behavioral: Negative for suicidal ideas, hallucinations, behavioral problems, self-injury, decreased concentration/cognition, agitation, confusion. PHYSICAL EXAM:   Nursing note and vitals per patient reviewed. There were no vitals taken for this visit. Constitutional: She appears well-developed and well-nourished. No acute distress. No respiratory distress. Skin: Skin appears to be warm and dry. No rashes or any other marks noted. She is not diaphoretic. Pulmonary/Respiratory: NO conversational dyspnea, no accessory muscle use, no coughing during exam. @ does not show labored breathing. Neurological/Psychiatric:She is alert and oriented to person, place, and time. Coordination is  normal.  Her mood isAppropriate and affect is Flat/blunted and Anxious. Her behavior is normal.   thought content normal.   Musculoskeletal / Extremities: Range of motion is normal. Gait is normal, assistive devices use: none. IMPRESSION:   1. Chronic pain syndrome    2. Facet syndrome, lumbar    3. Generalized abdominal pain    4. Myofascial pain    5. Failed back surgical syndrome    6. DDD (degenerative disc disease), lumbar    7. Primary insomnia        PLAN:  Informed verbal consent regarding treatment was obtained  -MRI lumbar spine from 2018 reviewed   -symptoms are not explained by the findings on the MRI, will repeat MRI with and without, she has history of 2-3 previous surgeries.    -continue with Celebrex   -will consider TPI/DANICA, refusing steroids  -will increase Lyrica to 150 mg in divided dose  -d/c Norco and switch to Ultram 1-2 per day  -she was advised proper sleep hygiene-told to avoid:use of caffeine or other stimulants after noon, alcohol use near bedtime, long or frequent naps during the day, erratic sleep schedule, heavy meals near bedtime, vigorous exercise near bedtime and use of electronic devices near bedtime. Continue with Pamelor    Current Outpatient Medications   Medication Sig Dispense Refill    nortriptyline (PAMELOR) 25 MG capsule Take 1-2 capsules by mouth nightly 60 capsule 1    celecoxib (CELEBREX) 200 MG capsule Take 1 capsule by mouth daily 60 capsule 1    pregabalin (LYRICA) 50 MG capsule 1 tab am 2 tabs pm 90 capsule 1     No current facility-administered medications for this visit. I will continue her current medication regimen  which is part of the above treatment schedule. It has been helping with Ms. Sánchez's chronic  medical problems which for this visit include:   Diagnoses of Chronic pain syndrome, Facet syndrome, lumbar, Generalized abdominal pain, Myofascial pain, Failed back surgical syndrome, and DDD (degenerative disc disease), lumbar were pertinent to this visit. Risks and benefits of the medications and other alternative treatments  including no treatment were discussed with the patient. The common side effects of these medications were also explained to the patient. Informed verbal consent was obtained. Goals of current treatment regimen include improvement in pain, restoration of functioning- with focus on improvement in physical performance, general activity, work or disability,emotional distress, health care utilization and  decreased medication consumption. Will continue to monitor progress towards achieving/maintaining therapeutic goals with special emphasis on  1. Improvement in perceived interfernce  of pain with ADL's. Ability to do home exercises independently. Ability to do household chores indoor and/or outdoor work and social and leisure activities. Improve psychosocial and physical functioning. - she is not showing any significant progress/or showing regression  towards this goal and reassessment and adjustment of goals/treatment have been made. 2. Improving sleep to 6-7 hours a night. Improve mood/ anxiety and depression symptoms such as crying spells, low energy, problems with concentration, motivation. - she is not showing any significant progress/or showing regression  towards this goal and reassessment and adjustment of goals/treatment have been made. 3. Reduction of reliance on opioid analgesia/more appropriate opioid use. - she is showing progression towards this treatment goal with the current regimen. She was advised against drinking alcohol with the narcotic pain medicines, advised against driving or handling machinery while adjusting the dose of medicines or if having cognitive  issues related to the current medications. Risk of overdose and death, if medicines not taken as prescribed, were also discussed. If the patient develops new symptoms or if the symptoms worsen, the patient should call the office. While transcribing every attempt was made to maintain the accuracy of the note in terms of it's contents,there may have been some errors made inadvertently. Thank you for allowing me to participate in the care of this patient.     Jenna Nyhan, MD.    Cc: Camron West

## 2020-07-16 ENCOUNTER — HOSPITAL ENCOUNTER (OUTPATIENT)
Dept: MRI IMAGING | Age: 50
Discharge: HOME | End: 2020-07-16
Payer: COMMERCIAL

## 2020-07-16 PROCEDURE — A9579 GAD-BASE MR CONTRAST NOS,1ML: HCPCS | Performed by: INTERNAL MEDICINE

## 2020-07-16 PROCEDURE — 6360000004 HC RX CONTRAST MEDICATION: Performed by: INTERNAL MEDICINE

## 2020-07-16 PROCEDURE — 72158 MRI LUMBAR SPINE W/O & W/DYE: CPT

## 2020-07-16 RX ADMIN — GADOTERIDOL 20 ML: 279.3 INJECTION, SOLUTION INTRAVENOUS at 13:12

## 2020-07-28 RX ORDER — TRAMADOL HYDROCHLORIDE 50 MG/1
TABLET ORAL
Qty: 60 TABLET | Refills: 0 | OUTPATIENT
Start: 2020-07-28

## 2020-07-30 ENCOUNTER — OFFICE VISIT (OUTPATIENT)
Dept: PAIN MANAGEMENT | Age: 50
End: 2020-07-30
Payer: COMMERCIAL

## 2020-07-30 VITALS
TEMPERATURE: 98.3 F | DIASTOLIC BLOOD PRESSURE: 77 MMHG | WEIGHT: 215 LBS | BODY MASS INDEX: 33.67 KG/M2 | HEART RATE: 89 BPM | SYSTOLIC BLOOD PRESSURE: 130 MMHG

## 2020-07-30 PROCEDURE — 20553 NJX 1/MLT TRIGGER POINTS 3/>: CPT | Performed by: INTERNAL MEDICINE

## 2020-07-30 PROCEDURE — 99212 OFFICE O/P EST SF 10 MIN: CPT | Performed by: INTERNAL MEDICINE

## 2020-07-30 RX ORDER — TRIAMCINOLONE ACETONIDE 40 MG/ML
40 INJECTION, SUSPENSION INTRA-ARTICULAR; INTRAMUSCULAR ONCE
Status: COMPLETED | OUTPATIENT
Start: 2020-07-30 | End: 2020-07-30

## 2020-07-30 RX ORDER — HYDROCODONE BITARTRATE AND ACETAMINOPHEN 5; 325 MG/1; MG/1
1 TABLET ORAL EVERY 6 HOURS PRN
Qty: 30 TABLET | Refills: 0 | Status: SHIPPED | OUTPATIENT
Start: 2020-07-30 | End: 2020-08-13

## 2020-07-30 RX ADMIN — TRIAMCINOLONE ACETONIDE 40 MG: 40 INJECTION, SUSPENSION INTRA-ARTICULAR; INTRAMUSCULAR at 10:46

## 2020-07-30 NOTE — PROGRESS NOTES
Ms. Ema Caro states she is having shooting pains down the right leg/ankle/foot, she says she can't bear weight. Patient says she had a MRI lumbar spine done recently, it shows post after surgical changes but no nerve impingement. She says she has multiple other muscle skeleton complaints also. Exam:  DTR's +2, strength 4/5    1. DDD (degenerative disc disease), lumbar    2. Chronic pain syndrome    3. Facet syndrome, lumbar      Plan:  -Informed verbal consent was obtained from the patient. Risks and benefits of the procedure were explained. Complications of the procedure and side effects of kenalog/ lidocaine were discussed with patient. Using 0.25% marcaine and 1cc of kenalog, the the tender trigger point areas in the  bilateral paraspinal lumbar muscles -erector spinae and longgissmus muscles were injected under aseptic condition. Mobilization attempted by stretching. Patient tolerated procedure well.  Adv to apply ice today.   -She was advised weight reduction, diet changes- 800-1200 maricarmen diet, diet diary, exercising, nutritional  consult increased physical activity as tolerated   -MRI lumbar spine reviewed; shows post after surgical changes with facet disease and DDD  -d/c Ultram  -start Norco 5 mg 1-2 per day  -will cancel remaining Ultram at pharmacy

## 2020-08-13 ENCOUNTER — VIRTUAL VISIT (OUTPATIENT)
Dept: PAIN MANAGEMENT | Age: 50
End: 2020-08-13
Payer: COMMERCIAL

## 2020-08-13 PROCEDURE — 99213 OFFICE O/P EST LOW 20 MIN: CPT | Performed by: INTERNAL MEDICINE

## 2020-08-13 RX ORDER — NORTRIPTYLINE HYDROCHLORIDE 25 MG/1
25-50 CAPSULE ORAL NIGHTLY
Qty: 60 CAPSULE | Refills: 1 | Status: SHIPPED | OUTPATIENT
Start: 2020-08-13 | End: 2020-09-25 | Stop reason: SDUPTHER

## 2020-08-13 NOTE — PROGRESS NOTES
TELE HEALTH VISIT (AUDIO-VISUAL)    Pursuant to the emergency declaration under the [facility], [facility] waiver authority and the [employer] Act, this Virtual  Visit was conducted, with patient's/legal guardian's consent, to reduce the patient's risk of exposure to COVID-19 and provide continuity of care for an established patient. Service is  provided through a video synchronous discussion virtually to substitute for in-person clinic visit. Due to this being a TeleHealth encounter (During YJGCL-64 public health emergency), evaluation of the following organ systems was limited: Vitals/Constitutional/EENT/Resp/CV/GI//MS/Neuro/Skin/Bpss-Uarl-Obv. Narendramanisha Jazzmine  1970  [id number]    Ms. Travis Voss is being seen virtually for a follow up visit using one of the following techniques  Face time  Informed verbal consent to the virtual visit was obtained from Ms. Travis Voss. Risks associated with HIPPA compliance with the virtual visit was explained to the patient. Ms. Travis Voss is at her residence and Dr. Brock Rouse is in his office. HISTORY OF PRESENT ILLNESS:  Ms. Travis Voss is a 44 y.o. female  being assessed for a follow up visit for pain management for evaluation of ongoing care regarding her symptoms and monitoring of compliance with long term use high risk medications. She has a diagnosis of   1. Chronic pain syndrome    2. DDD (degenerative disc disease), lumbar    3. Facet syndrome, lumbar    4. Failed back surgical syndrome    5. Myofascial pain    6. Generalized abdominal pain    . She complains of pain in the lower back  with radiation to the buttocks . She rates the pain 6/10 and describes it as sharp, aching. Current treatment regimen has helped relieve about 30% of the pain. She denies any side effects from the current pain regimen.  Patient reports that since the last follow up visit the physical functioning is unchanged, family/social relationships are unchanged, mood is unchanged sleep patterns are unchanged, and that the overall functioning is unchanged. Patient denies misusing/abusing her narcotic pain medications or using any illegal drugs. There are No indicators for possible drug abuse, addiction or diversion problems. Ms. Lalo Villanueva states she has been doing fair, pain was better for a week but getting worse. She says she feels the Izabella Domingoon does not help much, she was given Ultram from April prescription. She states she is using Ultram 1-2 per day. Patient reports her back hurts worse than her legs. ALLERGIES: Patients list of allergies were reviewed     MEDICATIONS: Ms. Lalo Villanueva list of medications were reviewed. Her current medications are   Outpatient Medications Prior to Visit   Medication Sig Dispense Refill    nortriptyline (PAMELOR) 25 MG capsule Take 1-2 capsules by mouth nightly 60 capsule 1    celecoxib (CELEBREX) 200 MG capsule Take 1 capsule by mouth daily 60 capsule 1    HYDROcodone-acetaminophen (NORCO) 5-325 MG per tablet Take 1 tablet by mouth every 6 hours as needed for Pain (max 1-2 per day) for up to 28 days. 30 tablet 0    pregabalin (LYRICA) 50 MG capsule 1 tab am 2 tabs pm 90 capsule 1     No facility-administered medications prior to visit. SOCIAL/FAMILY/PAST MEDICAL HISTORY: Ms. Anita Garrido, family and past medical history was reviewed. REVIEW OF SYSTEMS:    Respiratory: Negative for apnea, chest tightness and shortness of breath or change in baseline breathing. Gastrointestinal: Negative for nausea, vomiting, abdominal pain, diarrhea, constipation, blood in stool and abdominal distention. PHYSICAL EXAM:   Nursing note and vitals per patient reviewed. There were no vitals taken for this visit. Constitutional: She appears well-developed and well-nourished. No acute distress. No respiratory distress. Skin: Skin appears to be warm and dry.  No rashes or any other marks noted. She is not diaphoretic. Respiratory/Pulmonary: NO conversational dyspnea, no accessory muscle use, no coughing during exam. She does not appear to be in labored breathing. Neurological/Psychiatric:She is alert and oriented to person, place, and time. Coordination is  normal.  Her mood isAppropriate and affect is Neutral/Euthymic(normal). Musculoskeletal / Extremities: Range of motion is normal. Gait is normal, assistive devices use: none. IMPRESSION:   1. Chronic pain syndrome    2. DDD (degenerative disc disease), lumbar    3. Facet syndrome, lumbar    4. Failed back surgical syndrome    5. Myofascial pain    6. Generalized abdominal pain        PLAN:  Informed verbal consent regarding treatment was obtained  -continue with HEP  -Madelin exercises given  -continue with Celebrex and Pamelor  -She was advised weight reduction, diet changes- 800-1200 maricarmen diet, diet diary, exercising, nutritional  consult increased physical activity as tolerated      Current Outpatient Medications   Medication Sig Dispense Refill    nortriptyline (PAMELOR) 25 MG capsule Take 1-2 capsules by mouth nightly 60 capsule 1    celecoxib (CELEBREX) 200 MG capsule Take 1 capsule by mouth daily 60 capsule 1    pregabalin (LYRICA) 50 MG capsule 1 tab am 2 tabs pm 90 capsule 1     No current facility-administered medications for this visit. I will continue her current medication regimen  which is part of the above treatment schedule. It has been helping with Ms. Sánchez's chronic  medical problems which for this visit include:   Diagnoses of Chronic pain syndrome, DDD (degenerative disc disease), lumbar, Facet syndrome, lumbar, Failed back surgical syndrome, Myofascial pain, and Generalized abdominal pain were pertinent to this visit. Risks and benefits of the medications and other alternative treatments  including no treatment were discussed with the patient. The common side effects of these medications were also explained

## 2020-09-25 ENCOUNTER — VIRTUAL VISIT (OUTPATIENT)
Dept: PAIN MANAGEMENT | Age: 50
End: 2020-09-25
Payer: COMMERCIAL

## 2020-09-25 PROCEDURE — 99213 OFFICE O/P EST LOW 20 MIN: CPT | Performed by: INTERNAL MEDICINE

## 2020-09-25 RX ORDER — NORTRIPTYLINE HYDROCHLORIDE 25 MG/1
25-50 CAPSULE ORAL NIGHTLY
Qty: 60 CAPSULE | Refills: 1 | Status: SHIPPED | OUTPATIENT
Start: 2020-09-25 | End: 2020-11-05 | Stop reason: SDUPTHER

## 2020-09-25 RX ORDER — PREGABALIN 50 MG/1
CAPSULE ORAL
Qty: 90 CAPSULE | Refills: 1 | Status: SHIPPED | OUTPATIENT
Start: 2020-09-25 | End: 2020-10-23 | Stop reason: SDUPTHER

## 2020-09-25 RX ORDER — CELECOXIB 200 MG/1
200 CAPSULE ORAL DAILY
Qty: 60 CAPSULE | Refills: 1 | Status: SHIPPED | OUTPATIENT
Start: 2020-09-25 | End: 2020-11-05 | Stop reason: SDUPTHER

## 2020-09-25 RX ORDER — TRAMADOL HYDROCHLORIDE 50 MG/1
50 TABLET ORAL EVERY 6 HOURS PRN
Qty: 84 TABLET | Refills: 0 | Status: SHIPPED | OUTPATIENT
Start: 2020-09-25 | End: 2020-11-05 | Stop reason: SDUPTHER

## 2020-09-25 NOTE — PROGRESS NOTES
TELE HEALTH VISIT (AUDIO-VISUAL)    Pursuant to the emergency declaration under the [facility], [facility] waiver authority and the [employer] Act, this Virtual  Visit was conducted, with patient's/legal guardian's consent, to reduce the patient's risk of exposure to COVID-19 and provide continuity of care for an established patient. Service is  provided through a video synchronous discussion virtually to substitute for in-person clinic visit. Due to this being a TeleHealth encounter (During LIDKM-01 public health emergency), evaluation of the following organ systems was limited: Vitals/Constitutional/EENT/Resp/CV/GI//MS/Neuro/Skin/Dcgk-Cimr-Yhi. Magaly Maren  1970  [id number]    Ms. Michell Skinner is being seen virtually for a follow up visit using one of the following techniques  Face time  Informed verbal consent to the virtual visit was obtained from Ms. Michell Skinner. Risks associated with HIPPA compliance with the virtual visit was explained to the patient. Ms. Michell Skinner is at her residence and Dr. Lainey Theodore is in his office. HISTORY OF PRESENT ILLNESS:  Ms. Michell Skinner is a 44 y.o. female  being assessed for a follow up visit for pain management for evaluation of ongoing care regarding her symptoms and monitoring of compliance with long term use high risk medications. She has a diagnosis of   1. Chronic pain syndrome    2. DDD (degenerative disc disease), lumbar    3. Facet syndrome, lumbar    4. Myofascial pain    5. Failed back surgical syndrome    . She complains of pain in the lower back  with radiation to the buttocks, hips Right, upper leg Right, knees Right, lower leg Right and ankles Right . She rates the pain 4/10 and describes it as aching. Current treatment regimen has helped relieve about 50% of the pain. She denies any side effects from the current pain regimen.  Patient reports that since the last follow up visit the physical functioning is unchanged, family/social relationships are unchanged, mood is unchanged sleep patterns are unchanged, and that the overall functioning is unchanged. Patient denies misusing/abusing her narcotic pain medications or using any illegal drugs. There are No indicators for possible drug abuse, addiction or diversion problems. Ms. Tricia Roper states she has been doing fair, pain has been tolerable, she is doing better. Patient says he is doing stretches the last 7-10 days, she is having increase pain. She mentions she is using Norco but back to Ultram 1-2 per day. Patient denies any side effects with the medications. ALLERGIES: Patients list of allergies were reviewed     MEDICATIONS: Ms. Tricia Roper list of medications were reviewed. Her current medications are   Outpatient Medications Prior to Visit   Medication Sig Dispense Refill    nortriptyline (PAMELOR) 25 MG capsule Take 1-2 capsules by mouth nightly 60 capsule 1    celecoxib (CELEBREX) 200 MG capsule Take 1 capsule by mouth daily 60 capsule 1    pregabalin (LYRICA) 50 MG capsule 1 tab am 2 tabs pm 90 capsule 1     No facility-administered medications prior to visit. SOCIAL/FAMILY/PAST MEDICAL HISTORY: Ms. Kat Ventura, family and past medical history was reviewed. REVIEW OF SYSTEMS:    Respiratory: Negative for apnea, chest tightness and shortness of breath or change in baseline breathing. Gastrointestinal: Negative for nausea, vomiting, abdominal pain, diarrhea, constipation, blood in stool and abdominal distention. PHYSICAL EXAM:   Nursing note and vitals per patient reviewed. There were no vitals taken for this visit. Constitutional: She appears well-developed and well-nourished. No acute distress. No respiratory distress. Skin: Skin appears to be warm and dry. No rashes or any other marks noted. She is not diaphoretic.   Respiratory/Pulmonary: NO conversational dyspnea, no accessory muscle use, no coughing during exam. She does not appear to be in labored breathing. Neurological/Psychiatric:She is alert and oriented to person, place, and time. Coordination is  normal.  Her mood isAppropriate and affect is Neutral/Euthymic(normal). Musculoskeletal / Extremities: Range of motion is normal. Gait is normal, assistive devices use: none. IMPRESSION:   1. Chronic pain syndrome    2. DDD (degenerative disc disease), lumbar    3. Facet syndrome, lumbar    4. Myofascial pain    5. Failed back surgical syndrome        PLAN:  Informed verbal consent regarding treatment was obtained  -[place] was obtained and reviewed  for the last one year and no indicators of drug misuse  were found. Any other controlled substance prescriptions  seen on the record have been accounted for, I am aware of the patient receiving these medications. Petrona Chadwick [place] record will be rechecked as part of office protocol. -ROM/Stretching exercises as advised   -will change Norco to Ultram 1-2 per day  -continue with Celebrex, Lyrica and Pamelor  -She was advised weight reduction, diet changes- 800-1200 maricarmen diet, diet diary, exercising, nutritional  consult increased physical activity as tolerated   -Madelin exercises as advised  -will check UDS next visit in office      Current Outpatient Medications   Medication Sig Dispense Refill    nortriptyline (PAMELOR) 25 MG capsule Take 1-2 capsules by mouth nightly 60 capsule 1    celecoxib (CELEBREX) 200 MG capsule Take 1 capsule by mouth daily 60 capsule 1    pregabalin (LYRICA) 50 MG capsule 1 tab am 2 tabs pm 90 capsule 1     No current facility-administered medications for this visit. I will continue her current medication regimen  which is part of the above treatment schedule.  It has been helping with Ms. Sánchez's chronic  medical problems which for this visit include:   Diagnoses of Chronic pain syndrome, DDD (degenerative disc disease), lumbar, Facet syndrome, lumbar, Myofascial pain, and Failed back surgical syndrome were pertinent to this visit. Risks and benefits of the medications and other alternative treatments  including no treatment were discussed with the patient. The common side effects of these medications were also explained to the patient. Informed verbal consent was obtained. Goals of current treatment regimen include improvement in pain, restoration of functioning- with focus on improvement in physical performance, general activity, work or disability,emotional distress, health care utilization and  decreased medication consumption. Will continue to monitor progress towards achieving/maintaining therapeutic goals with special emphasis on  1. Improvement in perceived interfernce  of pain with ADL's. Ability to do home exercises independently. Ability to do household chores indoor and/or outdoor work and social and leisure activities. Improve psychosocial and physical functioning. - she is showing progression towards this treatment goal with the current regimen. She was advised against drinking alcohol with the narcotic pain medicines, advised against driving or handling machinery while adjusting the dose of medicines or if having cognitive  issues related to the current medications. Risk of overdose and death, if medicines not taken as prescribed, were also discussed. If the patient develops new symptoms or if the symptoms worsen, the patient should call the office. While transcribing every attempt was made to maintain the accuracy of the note in terms of it's contents,there may have been some errors made inadvertently. Thank you for allowing me to participate in the care of this patient.     Remedios Courtney MD.    Cc: Amber Joseph

## 2020-10-23 RX ORDER — PREGABALIN 50 MG/1
CAPSULE ORAL
Qty: 4 CAPSULE | Refills: 0 | OUTPATIENT
Start: 2020-10-23 | End: 2020-11-05 | Stop reason: ALTCHOICE

## 2020-10-23 NOTE — TELEPHONE ENCOUNTER
Patient called stating she's in [place], [place] & forgot to pack her Lyrica. She is requesting a for [facility] to send her 2 days worth (4 pills) of Lyrica.     Dion Holt / [place], [place]    Please advise [phone]

## 2020-10-23 NOTE — TELEPHONE ENCOUNTER
Called patient to advise her that per [facility] we can call in a partial Rx, patient voiced her understanding and states she would like it to be called to [facility] [place].  Called [facility] and gave Rx information

## 2020-11-05 ENCOUNTER — OFFICE VISIT (OUTPATIENT)
Dept: PAIN MANAGEMENT | Age: 50
End: 2020-11-05
Payer: COMMERCIAL

## 2020-11-05 VITALS
HEART RATE: 111 BPM | TEMPERATURE: 99 F | DIASTOLIC BLOOD PRESSURE: 78 MMHG | SYSTOLIC BLOOD PRESSURE: 115 MMHG | WEIGHT: 233 LBS | BODY MASS INDEX: 36.49 KG/M2

## 2020-11-05 PROCEDURE — 99214 OFFICE O/P EST MOD 30 MIN: CPT | Performed by: INTERNAL MEDICINE

## 2020-11-05 RX ORDER — METHYLPREDNISOLONE 4 MG/1
TABLET ORAL
Qty: 1 KIT | Refills: 0 | Status: SHIPPED | OUTPATIENT
Start: 2020-11-05 | End: 2020-12-17

## 2020-11-05 RX ORDER — TRAMADOL HYDROCHLORIDE 50 MG/1
50 TABLET ORAL EVERY 6 HOURS PRN
Qty: 126 TABLET | Refills: 0 | Status: SHIPPED | OUTPATIENT
Start: 2020-11-05 | End: 2020-12-17 | Stop reason: ALTCHOICE

## 2020-11-05 RX ORDER — CELECOXIB 200 MG/1
200 CAPSULE ORAL DAILY
Qty: 60 CAPSULE | Refills: 1 | Status: SHIPPED | OUTPATIENT
Start: 2020-11-05 | End: 2021-01-27 | Stop reason: SDUPTHER

## 2020-11-05 RX ORDER — NORTRIPTYLINE HYDROCHLORIDE 25 MG/1
25-50 CAPSULE ORAL NIGHTLY
Qty: 60 CAPSULE | Refills: 1 | Status: SHIPPED | OUTPATIENT
Start: 2020-11-05 | End: 2021-01-11

## 2020-11-05 RX ORDER — PREGABALIN 75 MG/1
CAPSULE ORAL
Qty: 90 CAPSULE | Refills: 0 | Status: SHIPPED | OUTPATIENT
Start: 2020-11-05 | End: 2020-12-15

## 2020-11-05 NOTE — PROGRESS NOTES
Panda Michelle  1970  [id number]    HISTORY OF PRESENT ILLNESS:  Ms. Etta Malagon is a 44 y.o. female returns for a follow up visit for multiple medical problems. Her  presenting problems are   1. Chronic pain syndrome    2. DDD (degenerative disc disease), lumbar    3. Myofascial pain    4. Facet syndrome, lumbar    5. Failed back surgical syndrome    6. Primary insomnia    . As per information/history obtained from the PADT(patient assessment and documentation tool) -  She complains of pain in the lower back with radiation to the buttocks, hips Right, upper leg Right, knees Right, lower leg Right, ankles Right and feet Right She rates the pain 6/10 and describes it as aching. Pain is made worse by: walking. Current treatment regimen has helped relieve about 30% of the pain. She denies side effects from the current pain regimen. Patient reports that since the last follow up visit the physical functioning is worse, family/social relationships are unchanged, mood is unchanged and sleep patterns are worse, and that the overall functioning is worse. Patient denies neurological bowel or bladder. Patient denies misusing/abusing her narcotic pain medications or using any illegal drugs. There are No indicators for possible drug abuse, addiction or diversion problems. Upon obtaining the medical history from Ms. Etta Malagon regarding today's office visit for her presenting problems, patient states she has been ding fair, the pain has been slightly better. She complains her legs hurt more than her back, states post surgery on right hip about 4 years ago. She mentions going up and down stairs hurt. She reports she is using Ultram 2 per day. She says she has a really bad ache in the right thigh, which is stabbing at times. Patient states her sleep is fair. Has fairly normal sleep latency. Averages about 4-6 hours of sleep a night. Denies any signs of sleep apnea. Feels somewhat rested in the morning.   Patient's  subjective report of her mood is fair. she describes occasional symptoms of depression, occasional  irritability and some mood swings. Describes her mood as being neutral and reports some pleasure in her daily activities. Reports  fair  appetite, energy and concentration. Able to function well in different aspects of her daily activities. Denies suicidal or homicidal ideation. Denies any complaints of increased tension, does   Worry sometimes and occasional  irritability  she denies any c/o increased anxiety, No c/o panic attacks or symptoms of PTSD. She reports her weight has been stable. ALLERGIES/PAST MED/FAM/SOC HISTORY: Ms. Dayna Camp allergies, past medical, family and social history were reviewed in the chart and also listed below.   Social History     Socioeconomic History    Marital status:      Spouse name: Not on file    Number of children: Not on file    Years of education: Not on file    Highest education level: Not on file   Occupational History    Not on file   Social Needs    Financial resource strain: Not on file    Food insecurity     Worry: Not on file     Inability: Not on file    Transportation needs     Medical: Not on file     Non-medical: Not on file   Tobacco Use    Smoking status: Never Smoker    Smokeless tobacco: Never Used   Substance and Sexual Activity    Alcohol use: No    Drug use: No    Sexual activity: Not on file   Lifestyle    Physical activity     Days per week: Not on file     Minutes per session: Not on file    Stress: Not on file   Relationships    Social connections     Talks on phone: Not on file     Gets together: Not on file     Attends Restoration service: Not on file     Active member of club or organization: Not on file     Attends meetings of clubs or organizations: Not on file     Relationship status: Not on file    Intimate partner violence     Fear of current or ex partner: Not on file     Emotionally abused: Not on file     Physically abused: Not on file     Forced sexual activity: Not on file   Other Topics Concern    Not on file   Social History Narrative    Not on file       Ms. Zackary Motley current medications are   Outpatient Medications Prior to Visit   Medication Sig Dispense Refill    pregabalin (LYRICA) 50 MG capsule 1 tab am 2 tabs pm 4 capsule 0    nortriptyline (PAMELOR) 25 MG capsule Take 1-2 capsules by mouth nightly 60 capsule 1    celecoxib (CELEBREX) 200 MG capsule Take 1 capsule by mouth daily 60 capsule 1    traMADol (ULTRAM) 50 MG tablet Take 1 tablet by mouth every 6 hours as needed for Pain (max 1-2 per day) for up to 42 days. Intended supply: 3 days. Take lowest dose possible to manage pain 84 tablet 0     No facility-administered medications prior to visit. REVIEW OF SYSTEMS:   Constitutional: Negative for fatigue and unexpected weight change. Eyes: Negative for visual disturbance. Respiratory: Negative for shortness of breath. Cardiovascular: Negative for chest pain, palpitations  Gastrointestinal: Negative for blood in stool, abdominal distention, nausea, vomiting, abdominal pain, diarrhea,constipation. Skin: Negative for color change or any abnormal bruising. Neurological: Negative for speech difficulty, weakness and light-headedness, dizziness, tremors, sleepiness  Psychiatric/Behavioral: Negative for suicidal ideas, hallucinations, behavioral problems, self-injury, decreased concentration/cognition, agitation, confusion. PHYSICAL EXAM:   Nursing note and vitals reviewed. /78 (Site: Left Upper Arm)   Pulse 111   Temp 99 °F (37.2 °C) (Oral)   Wt 233 lb (105.7 kg)   Breastfeeding No   BMI 36.49 kg/m²   General Appearance: Patient is well nourished, well developed, well groomed and in no acute distress. Skin: Skin is warm and dry, good turgor . No rash or lesions noted. She is not diaphoretic. Pulmonary/Chest: Effort normal. No respiratory distress or use of accessory muscles.  Auscultation revealing normal air entry. She does not have wheezes in the lung fields. She has no rales. Cardiovascular: Normal rate, regular rhythm, normal heart sounds, and does not have murmur. Exam reveals no gallop and no friction rub. Abdominal: Soft. Bowel sounds are normal.  On inspection of abdomen is flat no distension and no mass. No tenderness. She has no rebound and no guarding. Musculoskeletal / Extremities: Range of motion is normal. Gait is normal, assistive devices use: none. She exhibits edema: none, and no tenderness. Neurological/Psychiatric:She is alert and oriented to person, place, and time. Coordination is  normal.   Judgement and Insight is normal  Her mood is Appropriate and affect is Neutral/Euthymic(normal) . Her behavior is normal.   thought content normal.        IMPRESSION:     1. Chronic pain syndrome    2. DDD (degenerative disc disease), lumbar    3. Myofascial pain    4. Facet syndrome, lumbar    5. Failed back surgical syndrome    6. Primary insomnia    7. Lumbar radiculopathy    8. Primary osteoarthritis of right hip        PLAN:  Informed verbal consent was obtained.  -Continue with current opioid regimen, Increase Ultram to 2-3 per day   -Start Medrol dose pack   -MRI of Lumbar spine reviewed, for 2020, symptoms consist with radicular pain or pain associated  to right hip pathology, but less likely   -Xray right hip from 2018 reveiwed   -Will order MRI of right hip   -Urine drug screen with GC/MS for opiates and drugs of abuse was ordered and will follow up on results.   -Increase Lyrica to 75 mg 3 x per day  -Continue with Celebrex 200 mg   -she was advised proper sleep hygiene-told to avoid:use of caffeine or other stimulants after noon, alcohol use near bedtime, long or frequent naps during the day, erratic sleep schedule, heavy meals near bedtime, vigorous exercise near bedtime and use of electronic devices near bedtime   -Continue with Pamelor   Ms. Kavon Torres will be prescribed  the showing progression towards this treatment goal with the current regimen. 3. Reduction of reliance on opioid analgesia/more appropriate opioid use. - she is showing progression towards this treatment goal with the current regimen. 4. Ability to focus/concentrate at work and perform the duties required of her at work  Sit through a workday without lower extremity symptoms. Stand 30-60 minutes without lower extremity symptoms. Ability to lift up to 10-20 lbs. Ability to go up and down stairs. Sit 30-60 minutes  Without having to stand up frequently. - she is maintaining/progressing towards her work related goals with the current regimen. Risks and benefits of the medications and other alternative treatments have been/were  discussed with the patient. Any questions on the  common side effects of these medications were also answered. She was advised against drinking alcohol with the narcotic pain medicines, advised against driving or handling machinery when  starting or adjusting the dose of medicines, feeling groggy or drowsy, or if having any cognitive issues related to the current medications. Sheis fully aware of the risk of overdose and death, if medicines are misused and not taken as prescribed. If she develops new symptoms or if the symptoms worsen, she was told to call the office. .  Thank you for allowing me to participate in the care of this patient.     Brannon Castorena MD.    Cc: Britt Hernandez

## 2020-12-10 ENCOUNTER — HOSPITAL ENCOUNTER (OUTPATIENT)
Dept: MRI IMAGING | Age: 50
Discharge: HOME | End: 2020-12-10
Payer: COMMERCIAL

## 2020-12-10 PROCEDURE — 73721 MRI JNT OF LWR EXTRE W/O DYE: CPT

## 2020-12-15 RX ORDER — PREGABALIN 75 MG/1
CAPSULE ORAL
Qty: 90 CAPSULE | Refills: 0 | Status: SHIPPED | OUTPATIENT
Start: 2020-12-15 | End: 2021-01-25

## 2020-12-17 ENCOUNTER — VIRTUAL VISIT (OUTPATIENT)
Dept: PAIN MANAGEMENT | Age: 50
End: 2020-12-17
Payer: COMMERCIAL

## 2020-12-17 PROCEDURE — 99214 OFFICE O/P EST MOD 30 MIN: CPT | Performed by: INTERNAL MEDICINE

## 2020-12-17 RX ORDER — HYDROCODONE BITARTRATE AND ACETAMINOPHEN 5; 325 MG/1; MG/1
1 TABLET ORAL EVERY 6 HOURS PRN
Qty: 126 TABLET | Refills: 0 | Status: SHIPPED | OUTPATIENT
Start: 2020-12-17 | End: 2021-01-27

## 2020-12-17 NOTE — PROGRESS NOTES
TELE HEALTH VISIT (AUDIO-VISUAL)    Pursuant to the emergency declaration under the [facility], [facility] waiver authority and the [employer] and [employer] Act, this Virtual  Visit was conducted, with patient's/legal guardian's consent, to reduce the patient's risk of exposure to COVID-19 and provide continuity of care for an established patient. Service is  provided through a video synchronous discussion virtually to substitute for in-person clinic visit. Due to this being a TeleHealth encounter (During [facility]-38 public health emergency), evaluation of the following organ systems was limited: Vitals/Constitutional/EENT/Resp/CV/GI//MS/Neuro/Skin/Alqo-Kwak-Fpy. Nichelle Sagastume  1970  [id number]    Ms. Gianluca Otero is being seen virtually for a follow up visit using one of the following techniques  Doxy. me  Informed verbal consent to the virtual visit was obtained from Ms. Gianluca Otero. Risks associated with HIPPA compliance with the virtual visit was explained to the patient. Ms. Gianluca Otero is at her residence and Dr. Flaco Sow is in his office. HISTORY OF PRESENT ILLNESS:  Ms. Gianluca Otero is a 44 y.o. female returns for a follow up visit for multiple medical problems. Her current presenting problems are   1. Chronic pain syndrome    2. DDD (degenerative disc disease), lumbar    3. Myofascial pain    4. Facet syndrome, lumbar    5. Failed back surgical syndrome    6. Lumbar radiculopathy    7. Primary osteoarthritis of right hip    8. Generalized abdominal pain    . As per information/history obtained from the PADT(patient assessment and documentation tool) - She complains of pain in the lower back and hips Right with radiation to the groining  She rates the pain 6/10 and describes it as sharp, dull, aching. Pain is made worse by: sitting. Current treatment regimen has helped relieve about 0% of the pain. She denies side effects from the current pain regimen. Patient reports that since the last follow up visit the physical functioning is worse, family/social relationships are worse, mood is worse and sleep patterns are worse, and that the overall functioning is worse. Patient denies neurological bowel or bladder. Patient denies misusing/abusing her narcotic pain medications or using any illegal drugs. There are No indicators for possible drug abuse, addiction or diversion problems. Upon obtaining the medical history from Ms. Nicole Hebert regarding today's office visit for her presenting problems, patient complains her right leg, back and hip and groin hurting is hurting a lot, its hard to walk. She says she has a stabbing pain in side of hip even on sitting. She mentions her leg and foot throbs also. She reports she is using Ultram 3 per day but its not helping. She states she had a MRI of right hip done recently. Patient states her sleep is fair. Has fairly normal sleep latency. Averages about 4-6 hours of sleep a night. Denies any signs of sleep apnea. Feels somewhat rested in the morning. Patient's  subjective report of her mood is fair. she describes occasional symptoms of depression, occasional  irritability and some mood swings. Describes her mood as being neutral and reports some pleasure in her daily activities. Reports  fair  appetite, energy and concentration. Able to function well in different aspects of her daily activities. Denies suicidal or homicidal ideation.  Denies any complaints of increased tension, does   Worry sometimes and occasional  irritability she denies any c/o increased anxiety, No c/o panic attacks or symptoms of PTSD. She says her weight has been stable. She states she is managing some of her ADLs. and house chores. ALLERGIES/PAST MED/FAM/SOC HISTORY: Ms. Guero [place] allergies, past medical, family and social history were reviewed in the chart and also listed below. Social History     Socioeconomic History    Marital status:      Spouse name: Not on file    Number of children: Not on file    Years of education: Not on file    Highest education level: Not on file   Occupational History    Not on file   Social Needs    Financial resource strain: Not on file    Food insecurity     Worry: Not on file     Inability: Not on file    Transportation needs     Medical: Not on file     Non-medical: Not on file   Tobacco Use    Smoking status: Never Smoker    Smokeless tobacco: Never Used   Substance and Sexual Activity    Alcohol use: No    Drug use: No    Sexual activity: Not on file   Lifestyle    Physical activity     Days per week: Not on file     Minutes per session: Not on file    Stress: Not on file   Relationships    Social connections     Talks on phone: Not on file     Gets together: Not on file     Attends [employer] service: Not on file     Active member of club or organization: Not on file     Attends meetings of clubs or organizations: Not on file     Relationship status: Not on file    Intimate partner violence     Fear of current or ex partner: Not on file     Emotionally abused: Not on file     Physically abused: Not on file     Forced sexual activity: Not on file   Other Topics Concern    Not on file   Social History Narrative    Not on file       Ms. Guero Lake current medications are   Outpatient Medications Prior to Visit   Medication Sig Dispense Refill    pregabalin (LYRICA) 75 MG capsule TAKE ONE TABLET BY MOUTH IN THE MORNING AND TWO TABLETS BY MOUTH IN THE EVENING 90 capsule 0  nortriptyline (PAMELOR) 25 MG capsule Take 1-2 capsules by mouth nightly 60 capsule 1    celecoxib (CELEBREX) 200 MG capsule Take 1 capsule by mouth daily 60 capsule 1    traMADol (ULTRAM) 50 MG tablet Take 1 tablet by mouth every 6 hours as needed for Pain (max 2-3 per day) for up to 42 days. Intended supply: 3 days. Take lowest dose possible to manage pain 126 tablet 0    methylPREDNISolone (MEDROL, DORETHA,) 4 MG tablet Use as directed 1 kit 0     No facility-administered medications prior to visit. REVIEW OF SYSTEMS:   Constitutional: Negative for fatigue and unexpected weight change. Eyes: Negative for visual disturbance. Respiratory: Negative for shortness of breath. Cardiovascular: Negative for chest pain, palpitations  Gastrointestinal: Negative for blood in stool, abdominal distention, nausea, vomiting, abdominal pain, diarrhea,constipation. Skin: Negative for color change or any abnormal bruising. Neurological: Negative for speech difficulty, weakness and light-headedness, dizziness, tremors, sleepiness  Psychiatric/Behavioral: Negative for suicidal ideas, hallucinations, behavioral problems, self-injury, decreased concentration/cognition, agitation, confusion. PHYSICAL EXAM:   Nursing note and vitals reviewed. There were no vitals taken for this visit. General Appearance: Patient is well nourished, well developed, well groomed and in no acute distress. Skin: Skin is warm and dry, good turgor . No rash or lesions noted. She is not diaphoretic. Pulmonary/Chest: Effort normal. No respiratory distress or use of accessory muscles. Auscultation revealing normal air entry. She does not have wheezes in the lung fields. She has no rales. Cardiovascular: Normal rate, regular rhythm, normal heart sounds, and does not have murmur. Exam reveals no gallop and no friction rub. Abdominal: Soft. Bowel sounds are normal.  On inspection of abdomen is flat no distension and no mass. No tenderness. She has no rebound and no guarding. Musculoskeletal / Extremities: Range of motion is normal. Gait is normal, assistive devices use: none. She exhibits edema: none, and no tenderness. Neurological/Psychiatric:She is alert and oriented to person, place, and time. Coordination is  normal.   Judgement and Insight is normal  Her mood is Appropriate and affect is Flat/blunted and Anxious . Her behavior is normal.   thought content normal.        IMPRESSION:     1. Chronic pain syndrome    2. DDD (degenerative disc disease), lumbar    3. Myofascial pain    4. Facet syndrome, lumbar    5. Failed back surgical syndrome    6. Lumbar radiculopathy    7. Primary osteoarthritis of right hip    8. Generalized abdominal pain        PLAN:  Informed verbal consent was obtained.  -Continue with current regimen, Discontinue Ultram and start Norco 5 mg TID   -Refusing to increase Lyrica   -MRI of hip reviewed, + OA with minimal effusion   -MRI of Lumbar spine reviewed again + facet disease, DDD Lumbar spine, spinal stenosis and anterolisthesis L3-L4 and post surgical changed L4-L5.  -Advised DANICA Right L3-L4, L4-L5   -Follow up with spinal surgeon   -She was advised weight reduction, diet changes- 800-1200 maricarmen diet, diet diary, exercising, nutritional  consult increased physical activity as tolerated   -Mckenzise Exercises given   -Adv Biofeedback, relaxation and meditation techniques.  Referral to psychologist for CBT was also discussed with patient   -she was advised proper sleep hygiene-told to avoid:use of caffeine or other stimulants after noon, alcohol use near bedtime, long or frequent naps during the day, erratic sleep schedule, heavy meals near bedtime, vigorous exercise near bedtime and use of electronic devices near bedtime -Patient's urine drug screen results with GC/MS confirmation were obtained and reviewed and were negative for any illicit drugs. Prescribed medications were within acceptable range.   -She was advised to increase fluids ( 5-7  glasses of fluid daily), limit caffeine, avoid cheese products, increase dietary fiber, increase activity and exercise as tolerated and relax regularly and enjoy meals . Ms. Denis Hayes will be prescribed  the medications  listed below which are for treatment of her presenting  medical problems which for this visit include:   Diagnoses of Chronic pain syndrome, DDD (degenerative disc disease), lumbar, Myofascial pain, Facet syndrome, lumbar, Failed back surgical syndrome, Lumbar radiculopathy, Primary osteoarthritis of right hip, and Generalized abdominal pain were pertinent to this visit. Medications/orders associated with this visit:    Current Outpatient Medications   Medication Sig Dispense Refill    pregabalin (LYRICA) 75 MG capsule TAKE ONE TABLET BY MOUTH IN THE MORNING AND TWO TABLETS BY MOUTH IN THE EVENING 90 capsule 0    nortriptyline (PAMELOR) 25 MG capsule Take 1-2 capsules by mouth nightly 60 capsule 1    celecoxib (CELEBREX) 200 MG capsule Take 1 capsule by mouth daily 60 capsule 1    traMADol (ULTRAM) 50 MG tablet Take 1 tablet by mouth every 6 hours as needed for Pain (max 2-3 per day) for up to 42 days. Intended supply: 3 days. Take lowest dose possible to manage pain 126 tablet 0     No current facility-administered medications for this visit. Goals of current treatment regimen include improvement in pain, restoration of functioning- with focus on improvement in physical performance, general activity, work or disability,emotional distress, health care utilization and  decreased medication consumption.  Will continue to monitor progress towards achieving/maintaining therapeutic goals with special emphasis on 1. Improvement in perceived interfernce  of pain with ADL's. Ability to do home exercises independently. Ability to do household chores indoor and/or outdoor work and social and leisure activities. To increase flexibility/ROM, strength and endurance. Improve psychosocial and physical functioning.- she is not showing any significant progress/or showing regression  towards this goal and reassessment and adjustment of goals/treatment have been made. 2. Improving sleep to 6-7 hours a night. Improve mood/ anxiety and depression symptoms such as crying spells, low energy, problems with concentration, motivation. - she is showing progression towards this treatment goal with the current regimen. 3. Reduction of reliance on opioid analgesia/more appropriate opioid use. - she is showing progression towards this treatment goal with the current regimen. Risks and benefits of the medications and other alternative treatments have been/were  discussed with the patient. Any questions on the  common side effects of these medications were also answered. She was advised against drinking alcohol with the narcotic pain medicines, advised against driving or handling machinery when  starting or adjusting the dose of medicines, feeling groggy or drowsy, or if having any cognitive issues related to the current medications. Sheis fully aware of the risk of overdose and death, if medicines are misused and not taken as prescribed. If she develops new symptoms or if the symptoms worsen, she was told to call the office. .  Thank you for allowing me to participate in the care of this patient.     Cynthia Tobias MD.    Cc: Zeus Harris

## 2021-01-09 DIAGNOSIS — G89.4 CHRONIC PAIN SYNDROME: ICD-10-CM

## 2021-01-11 RX ORDER — NORTRIPTYLINE HYDROCHLORIDE 25 MG/1
CAPSULE ORAL
Qty: 60 CAPSULE | Refills: 0 | Status: SHIPPED | OUTPATIENT
Start: 2021-01-11 | End: 2021-01-27 | Stop reason: SDUPTHER

## 2021-01-16 DIAGNOSIS — M54.16 LUMBAR RADICULOPATHY: ICD-10-CM

## 2021-01-16 DIAGNOSIS — M79.18 MYOFASCIAL PAIN: ICD-10-CM

## 2021-01-16 DIAGNOSIS — M47.816 FACET SYNDROME, LUMBAR: ICD-10-CM

## 2021-01-16 DIAGNOSIS — M51.36 DDD (DEGENERATIVE DISC DISEASE), LUMBAR: ICD-10-CM

## 2021-01-16 DIAGNOSIS — G89.4 CHRONIC PAIN SYNDROME: ICD-10-CM

## 2021-01-16 DIAGNOSIS — M96.1 FAILED BACK SURGICAL SYNDROME: ICD-10-CM

## 2021-01-25 RX ORDER — PREGABALIN 75 MG/1
CAPSULE ORAL
Qty: 90 CAPSULE | Refills: 0 | Status: SHIPPED | OUTPATIENT
Start: 2021-01-25 | End: 2021-01-27 | Stop reason: SDUPTHER

## 2021-01-27 ENCOUNTER — VIRTUAL VISIT (OUTPATIENT)
Dept: PAIN MANAGEMENT | Age: 51
End: 2021-01-27
Payer: COMMERCIAL

## 2021-01-27 DIAGNOSIS — M47.816 FACET SYNDROME, LUMBAR: ICD-10-CM

## 2021-01-27 DIAGNOSIS — M54.16 LUMBAR RADICULOPATHY: ICD-10-CM

## 2021-01-27 DIAGNOSIS — M51.36 DDD (DEGENERATIVE DISC DISEASE), LUMBAR: ICD-10-CM

## 2021-01-27 DIAGNOSIS — M16.11 PRIMARY OSTEOARTHRITIS OF RIGHT HIP: ICD-10-CM

## 2021-01-27 DIAGNOSIS — M96.1 FAILED BACK SURGICAL SYNDROME: ICD-10-CM

## 2021-01-27 DIAGNOSIS — G89.4 CHRONIC PAIN SYNDROME: ICD-10-CM

## 2021-01-27 DIAGNOSIS — M79.18 MYOFASCIAL PAIN: ICD-10-CM

## 2021-01-27 DIAGNOSIS — R10.84 GENERALIZED ABDOMINAL PAIN: ICD-10-CM

## 2021-01-27 PROCEDURE — 99213 OFFICE O/P EST LOW 20 MIN: CPT | Performed by: INTERNAL MEDICINE

## 2021-01-27 RX ORDER — PREGABALIN 75 MG/1
CAPSULE ORAL
Qty: 90 CAPSULE | Refills: 0 | Status: SHIPPED | OUTPATIENT
Start: 2021-01-27 | End: 2021-03-10 | Stop reason: SDUPTHER

## 2021-01-27 RX ORDER — NORTRIPTYLINE HYDROCHLORIDE 25 MG/1
CAPSULE ORAL
Qty: 60 CAPSULE | Refills: 1 | Status: SHIPPED | OUTPATIENT
Start: 2021-01-27 | End: 2021-03-10 | Stop reason: SDUPTHER

## 2021-01-27 RX ORDER — CELECOXIB 200 MG/1
200 CAPSULE ORAL DAILY
Qty: 60 CAPSULE | Refills: 1 | Status: SHIPPED | OUTPATIENT
Start: 2021-01-27 | End: 2021-03-10 | Stop reason: SDUPTHER

## 2021-01-27 RX ORDER — TRAMADOL HYDROCHLORIDE 50 MG/1
50 TABLET ORAL EVERY 6 HOURS PRN
Qty: 90 TABLET | Refills: 0 | Status: SHIPPED | OUTPATIENT
Start: 2021-01-27 | End: 2021-03-10 | Stop reason: SDUPTHER

## 2021-01-27 NOTE — PROGRESS NOTES
TELE HEALTH VISIT (AUDIO-VISUAL)    Pursuant to the emergency declaration under the [facility], [facility] waiver authority and the [employer] and [employer] Act, this Virtual  Visit was conducted, with patient's/legal guardian's consent, to reduce the patient's risk of exposure to COVID-19 and provide continuity of care for an established patient. Service is  provided through a video synchronous discussion virtually to substitute for in-person clinic visit. Due to this being a TeleHealth encounter (During [facility]-86 public health emergency), evaluation of the following organ systems was limited: Vitals/Constitutional/EENT/Resp/CV/GI//MS/Neuro/Skin/Zzix-Iwvv-Cua. Simon Oswald  1970  [id number]    Ms. Ana Cristina Alberto is being seen virtually for a follow up visit using one of the following techniques   Doxy. me  Informed verbal consent to the virtual visit was obtained from Ms. Ana Cristina Alberto. Risks associated with HIPPA compliance with the virtual visit was explained to the patient. Ms. Ana Cristina Alberto is at her residence and Dr. Remi Sanchez is in his office. HISTORY OF PRESENT ILLNESS:  Ms. Ana Cristina Alberto is a 44 y.o. female returns for a follow up visit for multiple medical problems. Her current presenting problems are   1. Chronic pain syndrome    2. DDD (degenerative disc disease), lumbar    3. Myofascial pain    4. Facet syndrome, lumbar    5. Failed back surgical syndrome    6. Lumbar radiculopathy    7. Primary osteoarthritis of right hip    8. Generalized abdominal pain    . As per information/history obtained from the PADT(patient assessment and documentation tool) - She complains of pain in the lower back with radiation to the hips Bilateral and groining area She rates the pain 4/10 and describes it as aching. Pain is made worse by: walking. Current treatment regimen has helped relieve about 70% of the pain. She denies side effects from the current pain regimen. Patient reports that since the last follow up visit the physical functioning is unchanged, family/social relationships are unchanged, mood is unchanged and sleep patterns are unchanged, and that the overall functioning is unchanged. Patient denies neurological bowel or bladder. Patient denies misusing/abusing her narcotic pain medications or using any illegal drugs. There are No indicators for possible drug abuse, addiction or diversion problems. Upon obtaining the medical history from Ms. Napoleon Collado regarding today's office visit for her presenting problems, patient states she has been doing fair and the pain is better than last month. She reports she had a hip injection. 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 She says she is using Celebrex along with Norco. She states she did not like it and wants to go back to the Tramadol. She mentions she is doing PT exercises still, but hurts to walk. ALLERGIES: Patients list of allergies were reviewed     MEDICATIONS: Ms. Napoleon Collado list of medications were reviewed. Her current medications are   Outpatient Medications Prior to Visit   Medication Sig Dispense Refill    pregabalin (LYRICA) 75 MG capsule TAKE ONE CAPSULE BY MOUTH EVERY MORNING AND TAKE TWO CAPSULES BY MOUTH EVERY EVENING 90 capsule 0    nortriptyline (PAMELOR) 25 MG capsule TAKE ONE TO TWO CAPSULES BY MOUTH ONCE NIGHTLY 60 capsule 0    HYDROcodone-acetaminophen (NORCO) 5-325 MG per tablet Take 1 tablet by mouth every 6 hours as needed for Pain (max 3 per day) for up to 42 days.  126 tablet 0  celecoxib (CELEBREX) 200 MG capsule Take 1 capsule by mouth daily 60 capsule 1     No facility-administered medications prior to visit. REVIEW OF SYSTEMS:    Respiratory: Negative for apnea, chest tightness and shortness of breath or change in baseline breathing. PHYSICAL EXAM:   Nursing note and vitals reviewed. There were no vitals taken for this visit. Constitutional: She appears well-developed and well-nourished. No acute distress. Cardiovascular: Normal rate, regular rhythm, normal heart sounds, and does not have murmur. Pulmonary/Chest: Effort normal. No respiratory distress. She does not have wheezes in the lung fields. She has no rales. Neurological/Psychiatric:She is alert and oriented to person, place, and time. Coordination is  normal.  Her mood isAppropriate and affect is Anxious . IMPRESSION:   1. Chronic pain syndrome    2. DDD (degenerative disc disease), lumbar    3. Myofascial pain    4. Facet syndrome, lumbar    5. Failed back surgical syndrome    6. Lumbar radiculopathy    7. Primary osteoarthritis of right hip    8. Generalized abdominal pain        PLAN:  Informed verbal consent was obtained  -[place] record was obtained and reviewed  for the last one year and no indicators of drug misuse  were found. Any other controlled substance prescriptions  seen on the record have been accounted for, I am aware of the patient receiving these medications. Mike Angry  [place] record will be rechecked as part of office protocol.    -Discontinue Norco   -Continue with Lyrica and Pamelor   -Start Ultram 1-2 per day   -She was advised weight reduction, diet changes- 800-1200 maricarmen diet, diet diary, exercising, nutritional  consult increased physical activity as tolerated    Current Outpatient Medications   Medication Sig Dispense Refill    pregabalin (LYRICA) 75 MG capsule TAKE ONE CAPSULE BY MOUTH EVERY MORNING AND TAKE TWO CAPSULES BY MOUTH EVERY EVENING 90 capsule 0  nortriptyline (PAMELOR) 25 MG capsule TAKE ONE TO TWO CAPSULES BY MOUTH ONCE NIGHTLY 60 capsule 0    HYDROcodone-acetaminophen (NORCO) 5-325 MG per tablet Take 1 tablet by mouth every 6 hours as needed for Pain (max 3 per day) for up to 42 days. 126 tablet 0    celecoxib (CELEBREX) 200 MG capsule Take 1 capsule by mouth daily 60 capsule 1     No current facility-administered medications for this visit. I will continue her current medication regimen  which is part of the above treatment schedule. It has been helping with Ms. Sánchez's chronic  medical problems which for this visit include:   Diagnoses of Chronic pain syndrome, DDD (degenerative disc disease), lumbar, Myofascial pain, Facet syndrome, lumbar, Failed back surgical syndrome, Lumbar radiculopathy, Primary osteoarthritis of right hip, and Generalized abdominal pain were pertinent to this visit. Risks and benefits of the medications and other alternative treatments  including no treatment were discussed with the patient. The common side effects of these medications were also explained to the patient. Informed verbal consent was obtained. Goals of current treatment regimen include improvement in pain, restoration of functioning- with focus on improvement in physical performance, general activity, work or disability,emotional distress, health care utilization and  decreased medication consumption. Will continue to monitor progress towards achieving/maintaining therapeutic goals with special emphasis on  1. Improvement in perceived interfernce  of pain with ADL's. Ability to do home exercises independently. Ability to do household chores indoor and/or outdoor work and social and leisure activities. Improve psychosocial and physical functioning. - she is showing progression towards this treatment goal with the current regimen. She was advised against drinking alcohol with the narcotic pain medicines, advised against driving or handling machinery while adjusting the dose of medicines or if having cognitive  issues related to the current medications. Risk of overdose and death, if medicines not taken as prescribed, were also discussed. If the patient develops new symptoms or if the symptoms worsen, the patient should call the office. While transcribing every attempt was made to maintain the accuracy of the note in terms of it's contents,there may have been some errors made inadvertently. Thank you for allowing me to participate in the care of this patient.     Shahzad Melara MD.    Cc: Mau Trammell

## 2021-03-10 ENCOUNTER — VIRTUAL VISIT (OUTPATIENT)
Dept: PAIN MANAGEMENT | Age: 51
End: 2021-03-10
Payer: COMMERCIAL

## 2021-03-10 DIAGNOSIS — M51.36 DDD (DEGENERATIVE DISC DISEASE), LUMBAR: ICD-10-CM

## 2021-03-10 DIAGNOSIS — M96.1 FAILED BACK SURGICAL SYNDROME: ICD-10-CM

## 2021-03-10 DIAGNOSIS — G89.4 CHRONIC PAIN SYNDROME: ICD-10-CM

## 2021-03-10 DIAGNOSIS — R10.84 GENERALIZED ABDOMINAL PAIN: ICD-10-CM

## 2021-03-10 DIAGNOSIS — M79.18 MYOFASCIAL PAIN: ICD-10-CM

## 2021-03-10 DIAGNOSIS — M54.16 LUMBAR RADICULOPATHY: ICD-10-CM

## 2021-03-10 DIAGNOSIS — M47.816 FACET SYNDROME, LUMBAR: ICD-10-CM

## 2021-03-10 DIAGNOSIS — M16.11 PRIMARY OSTEOARTHRITIS OF RIGHT HIP: ICD-10-CM

## 2021-03-10 PROCEDURE — 99213 OFFICE O/P EST LOW 20 MIN: CPT | Performed by: INTERNAL MEDICINE

## 2021-03-10 RX ORDER — TRAMADOL HYDROCHLORIDE 50 MG/1
50 TABLET ORAL EVERY 6 HOURS PRN
Qty: 90 TABLET | Refills: 0 | Status: SHIPPED | OUTPATIENT
Start: 2021-03-10 | End: 2021-04-21 | Stop reason: ALTCHOICE

## 2021-03-10 RX ORDER — PREGABALIN 75 MG/1
CAPSULE ORAL
Qty: 90 CAPSULE | Refills: 1 | Status: SHIPPED | OUTPATIENT
Start: 2021-03-10 | End: 2021-04-21 | Stop reason: SDUPTHER

## 2021-03-10 RX ORDER — NORTRIPTYLINE HYDROCHLORIDE 25 MG/1
CAPSULE ORAL
Qty: 60 CAPSULE | Refills: 1 | Status: SHIPPED | OUTPATIENT
Start: 2021-03-10 | End: 2021-04-21 | Stop reason: SDUPTHER

## 2021-03-10 RX ORDER — CELECOXIB 200 MG/1
200 CAPSULE ORAL DAILY
Qty: 60 CAPSULE | Refills: 1 | Status: SHIPPED | OUTPATIENT
Start: 2021-03-10 | End: 2021-04-21 | Stop reason: SDUPTHER

## 2021-03-10 NOTE — PROGRESS NOTES
regimen. Patient reports that since the last follow up visit the physical functioning is unchanged, family/social relationships are unchanged, mood is unchanged and sleep patterns are unchanged, and that the overall functioning is unchanged. Patient denies neurological bowel or bladder. Patient denies misusing/abusing her narcotic pain medications or using any illegal drugs. There are No indicators for possible drug abuse, addiction or diversion problems. Upon obtaining the medical history from Ms. Wilber Godfrey regarding today's office visit for her presenting problems, patient states she has been managing her medications. She says she is using Ultram along with Celebrex and the other adjuvants. She mentions the pain has been the same. She reports she has follow up appointment with ortho. Has not had much help with injections, symptoms probably radicular in nature. ALLERGIES: Patients list of allergies were reviewed     MEDICATIONS: Ms. Wilber Godfrey list of medications were reviewed. Her current medications are   Outpatient Medications Prior to Visit   Medication Sig Dispense Refill    nortriptyline (PAMELOR) 25 MG capsule TAKE ONE TO TWO CAPSULES BY MOUTH ONCE NIGHTLY 60 capsule 1    celecoxib (CELEBREX) 200 MG capsule Take 1 capsule by mouth daily 60 capsule 1    traMADol (ULTRAM) 50 MG tablet Take 1 tablet by mouth every 6 hours as needed for Pain (max 1-2 per day) for up to 42 days. 90 tablet 0    pregabalin (LYRICA) 75 MG capsule TAKE ONE CAPSULE BY MOUTH EVERY MORNING AND TAKE TWO CAPSULES BY MOUTH EVERY EVENING 90 capsule 0     No facility-administered medications prior to visit. REVIEW OF SYSTEMS:    Respiratory: Negative for apnea, chest tightness and shortness of breath or change in baseline breathing. PHYSICAL EXAM:   Nursing note and vitals reviewed. There were no vitals taken for this visit. Constitutional: She appears well-developed and well-nourished. No acute distress. Cardiovascular: Normal rate, regular rhythm, normal heart sounds, and does not have murmur. Pulmonary/Chest: Effort normal. No respiratory distress. She does not have wheezes in the lung fields. She has no rales. Neurological/Psychiatric:She is alert and oriented to person, place, and time. Coordination is  normal.  Her mood isAppropriate and affect is Neutral/Euthymic(normal) . Her    IMPRESSION:   1. Chronic pain syndrome    2. DDD (degenerative disc disease), lumbar    3. Myofascial pain    4. Facet syndrome, lumbar    5. Failed back surgical syndrome    6. Lumbar radiculopathy    7. Primary osteoarthritis of right hip    8. Generalized abdominal pain        PLAN:  Informed verbal consent was obtained  -[place] record was obtained and reviewed  for the last one year and no indicators of drug misuse  were found. Any other controlled substance prescriptions  seen on the record have been accounted for, I am aware of the patient receiving these medications. Keyona Reese [place] record will be rechecked as part of office protocol.    -Madelin exercises given  -continue with Lyrica same dose and Ultram 1-2 per day  -She was advised to increase fluids ( 5-7  glasses of fluid daily), limit caffeine, avoid cheese products, increase dietary fiber, increase activity and exercise as tolerated and relax regularly and enjoy meals   -advised to walk 20-30 minutes daily as tolerated  -MRI Lumbar spine reviewed      Current Outpatient Medications   Medication Sig Dispense Refill    nortriptyline (PAMELOR) 25 MG capsule TAKE ONE TO TWO CAPSULES BY MOUTH ONCE NIGHTLY 60 capsule 1    celecoxib (CELEBREX) 200 MG capsule Take 1 capsule by mouth daily 60 capsule 1    traMADol (ULTRAM) 50 MG tablet Take 1 tablet by mouth every 6 hours as needed for Pain (max 1-2 per day) for up to 42 days.  90 tablet 0    pregabalin (LYRICA) 75 MG capsule TAKE ONE CAPSULE BY MOUTH EVERY MORNING AND TAKE TWO CAPSULES BY MOUTH EVERY EVENING 90 capsule 0 No current facility-administered medications for this visit. I will continue her current medication regimen  which is part of the above treatment schedule. It has been helping with Ms. Sánchez's chronic  medical problems which for this visit include:   Diagnoses of Chronic pain syndrome, DDD (degenerative disc disease), lumbar, Myofascial pain, Facet syndrome, lumbar, Failed back surgical syndrome, Lumbar radiculopathy, Primary osteoarthritis of right hip, and Generalized abdominal pain were pertinent to this visit. Risks and benefits of the medications and other alternative treatments  including no treatment were discussed with the patient. The common side effects of these medications were also explained to the patient. Informed verbal consent was obtained. Goals of current treatment regimen include improvement in pain, restoration of functioning- with focus on improvement in physical performance, general activity, work or disability,emotional distress, health care utilization and  decreased medication consumption. Will continue to monitor progress towards achieving/maintaining therapeutic goals with special emphasis on  1. Improvement in perceived interfernce  of pain with ADL's. Ability to do home exercises independently. Ability to do household chores indoor and/or outdoor work and social and leisure activities. Improve psychosocial and physical functioning. - she is showing progression towards this treatment goal with the current regimen. She was advised against drinking alcohol with the narcotic pain medicines, advised against driving or handling machinery while adjusting the dose of medicines or if having cognitive  issues related to the current medications. Risk of overdose and death, if medicines not taken as prescribed, were also discussed. If the patient develops new symptoms or if the symptoms worsen, the patient should call the office.     While transcribing every attempt was made to maintain the accuracy of the note in terms of it's contents,there may have been some errors made inadvertently. Thank you for allowing me to participate in the care of this patient.     Hugo Hughes MD.    Cc: Lacey Brown

## 2021-04-21 ENCOUNTER — VIRTUAL VISIT (OUTPATIENT)
Dept: PAIN MANAGEMENT | Age: 51
End: 2021-04-21
Payer: COMMERCIAL

## 2021-04-21 DIAGNOSIS — M96.1 FAILED BACK SURGICAL SYNDROME: ICD-10-CM

## 2021-04-21 DIAGNOSIS — M79.18 MYOFASCIAL PAIN: ICD-10-CM

## 2021-04-21 DIAGNOSIS — M51.36 DDD (DEGENERATIVE DISC DISEASE), LUMBAR: ICD-10-CM

## 2021-04-21 DIAGNOSIS — F51.01 PRIMARY INSOMNIA: ICD-10-CM

## 2021-04-21 DIAGNOSIS — M16.11 PRIMARY OSTEOARTHRITIS OF RIGHT HIP: ICD-10-CM

## 2021-04-21 DIAGNOSIS — M47.816 FACET SYNDROME, LUMBAR: ICD-10-CM

## 2021-04-21 DIAGNOSIS — R10.84 GENERALIZED ABDOMINAL PAIN: ICD-10-CM

## 2021-04-21 DIAGNOSIS — G89.4 CHRONIC PAIN SYNDROME: ICD-10-CM

## 2021-04-21 DIAGNOSIS — M54.16 LUMBAR RADICULOPATHY: ICD-10-CM

## 2021-04-21 PROCEDURE — 99214 OFFICE O/P EST MOD 30 MIN: CPT | Performed by: INTERNAL MEDICINE

## 2021-04-21 RX ORDER — NORTRIPTYLINE HYDROCHLORIDE 25 MG/1
CAPSULE ORAL
Qty: 60 CAPSULE | Refills: 1 | Status: SHIPPED | OUTPATIENT
Start: 2021-04-21 | End: 2021-10-14 | Stop reason: SDUPTHER

## 2021-04-21 RX ORDER — HYDROCODONE BITARTRATE AND ACETAMINOPHEN 5; 325 MG/1; MG/1
1 TABLET ORAL EVERY 6 HOURS PRN
Qty: 90 TABLET | Refills: 0 | Status: SHIPPED | OUTPATIENT
Start: 2021-04-21 | End: 2021-06-01 | Stop reason: SDUPTHER

## 2021-04-21 RX ORDER — CELECOXIB 200 MG/1
200 CAPSULE ORAL DAILY
Qty: 60 CAPSULE | Refills: 1 | Status: SHIPPED | OUTPATIENT
Start: 2021-04-21 | End: 2021-11-18 | Stop reason: SDUPTHER

## 2021-04-21 RX ORDER — PREGABALIN 75 MG/1
CAPSULE ORAL
Qty: 90 CAPSULE | Refills: 1 | Status: SHIPPED | OUTPATIENT
Start: 2021-04-21 | End: 2021-11-18 | Stop reason: SDUPTHER

## 2021-04-21 NOTE — PROGRESS NOTES
TELE HEALTH VISIT (AUDIO-VISUAL)    Pursuant to the emergency declaration under the [facility] waiver authority and the [employer] Act, this Virtual  Visit was conducted, with patient's/legal guardian's consent, to reduce the patient's risk of exposure to COVID-19 and provide continuity of care for an established patient. Service is  provided through a video synchronous discussion virtually to substitute for in-person clinic visit. Due to this being a TeleHealth encounter (During EZLCR-57 public health emergency), evaluation of the following organ systems was limited: Vitals/Constitutional/EENT/Resp/CV/GI//MS/Neuro/Skin/Yzae-Tbti-Weo. Fina Guido  1970  [id number]    Ms. Vero Oleary is being seen virtually for a follow up visit using one of the following techniques  Google Duo, Face time or Doxy. me  Informed verbal consent to the virtual visit was obtained from Ms. Vero Oleary. Risks associated with HIPPA compliance with the virtual visit was explained to the patient. Ms. Vero Oleary is at her residence and Dr. Alyssia Schwartz is in his office. HISTORY OF PRESENT ILLNESS:  Ms. Vero Oleary is a 44 y.o. female returns for a follow up visit for multiple medical problems. Her current presenting problems are   1. Chronic pain syndrome    2. DDD (degenerative disc disease), lumbar    3. Myofascial pain    4. Lumbar radiculopathy    5. Failed back surgical syndrome    6. Facet syndrome, lumbar    7. Primary osteoarthritis of right hip    8. Generalized abdominal pain    9. Primary insomnia    . As per information/history obtained from the PADT(patient assessment and documentation tool) - She complains of pain in the lower back with radiation to the hips Right She rates the pain 7/10 and describes it as aching, piercing. Pain is made worse by: movement. Current treatment regimen has helped relieve about 30% of the pain.   She denies side effects from the current pain regimen. Patient reports that since the last follow up visit the physical functioning is worse, family/social relationships are unchanged, mood is worse and sleep patterns are worse, and that the overall functioning is worse. Patient denies neurological bowel or bladder. Patient denies misusing/abusing her narcotic pain medications or using any illegal drugs. There are No indicators for possible drug abuse, addiction or diversion problems. Upon obtaining the medical history from Ms. Purnima Fuchs regarding today's office visit for her presenting problems, patient states she has been having increase pain in the back and hip flaring up. She states she saw Ortho recently. Patient says she is going for a arthrogram. Patient states it is difficult to put weight on the right leg or walk. 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 Patient states her sleep is fair. Has fairly normal sleep latency. Averages about 4-6 hours of sleep a night. Denies any signs of sleep apnea. Feels somewhat rested in the morning. Patient states her sleep is fair. Has fairly normal sleep latency. Averages about 4-6 hours of sleep a night. Denies any signs of sleep apnea. Feels somewhat rested in the morning, she is using Pamelor. Patient reports her weight has been stable. She states she is managing her ADL's. ALLERGIES/PAST MED/FAM/SOC HISTORY: Ms. Purnima Fuchs allergies, past medical, family and social history were reviewed in the chart. Ms. Purnima Fuchs current medications are   Outpatient Medications Prior to Visit   Medication Sig Dispense Refill    nortriptyline (PAMELOR) 25 MG capsule TAKE ONE TO TWO CAPSULES BY MOUTH ONCE NIGHTLY 60 capsule 1    celecoxib (CELEBREX) 200 MG capsule Take 1 capsule by mouth daily 60 capsule 1    traMADol (ULTRAM) 50 MG tablet Take 1 tablet by mouth every 6 hours as needed for Pain (max 1-2 per day) for up to 42 days.  90 tablet 0    pregabalin (LYRICA) 75 MG capsule TAKE ONE CAPSULE BY MOUTH EVERY MORNING AND TAKE TWO CAPSULES BY MOUTH EVERY EVENING 90 capsule 1     No facility-administered medications prior to visit. REVIEW OF SYSTEMS:     Respiratory: Negative for shortness of breath. Cardiovascular: Negative for chest pain, palpitations  Gastrointestinal: Negative for blood in stool, abdominal distention, nausea, vomiting, abdominal pain, diarrhea,constipation. Neurological: Negative for speech difficulty, weakness and light-headedness, dizziness, tremors, sleepiness  Psychiatric/Behavioral: Negative for suicidal ideas, hallucinations, behavioral problems, self-injury, decreased concentration/cognition, agitation, confusion. PHYSICAL EXAM:   Nursing note and vitals reviewed. There were no vitals taken for this visit. General Appearance: Patient is well nourished, well developed, well groomed and in no acute distress. Skin: Skin is warm and dry, good turgor . No rash or lesions noted. She is not diaphoretic. Pulmonary/Chest: Effort normal. No respiratory distress or use of accessory muscles. Auscultation revealing normal air entry. She does not have wheezes in the lung fields. She has no rales. Cardiovascular: Normal rate, regular rhythm, normal heart sounds, and does not have murmur. Exam reveals no gallop and no friction rub. Musculoskeletal / Extremities: Range of motion is normal. Gait is normal, assistive devices use: none. She exhibits edema: none, and no tenderness. Neurological/Psychiatric:She is alert and oriented to person, place, and time. Coordination is  normal.   Judgement and Insight is normal  Her mood is Appropriate and affect is Neutral/Euthymic(normal) . Her behavior is normal.   thought content normal.        IMPRESSION:     1. Chronic pain syndrome    2. DDD (degenerative disc disease), lumbar    3. Myofascial pain    4. Lumbar radiculopathy    5. Failed back surgical syndrome    6. Facet syndrome, lumbar    7. Primary osteoarthritis of right hip    8. Generalized abdominal pain    9. Primary insomnia        PLAN:  Informed verbal consent was obtained.  -continue with current opioid regimen   -discontinue Ultram   -start Norco 5 mg 2-3 per day  -continue with Celebrex  -follow up with Orthogram of hip  -advised to consider EMG RLE  -she was advised proper sleep hygiene-told to avoid:use of caffeine or other stimulants after noon, alcohol use near bedtime, long or frequent naps during the day, erratic sleep schedule, heavy meals near bedtime, vigorous exercise near bedtime and use of electronic devices near bedtime. Continue with Pamelor  -continue with Lyrica 75 TID  -She was advised weight reduction, diet changes- 800-1200 maricarmen diet, diet diary, exercising, nutritional  consult increased physical activity as tolerated   -hip and back exercises given    Ms. Purnima Fuchs will be prescribed  the medications  listed below which are for treatment of her presenting  medical problems which for this visit include:   Diagnoses of Chronic pain syndrome, DDD (degenerative disc disease), lumbar, Myofascial pain, Lumbar radiculopathy, Failed back surgical syndrome, Facet syndrome, lumbar, Primary osteoarthritis of right hip, Generalized abdominal pain, and Primary insomnia were pertinent to this visit. Medications/orders associated with this visit:    Current Outpatient Medications   Medication Sig Dispense Refill    nortriptyline (PAMELOR) 25 MG capsule TAKE ONE TO TWO CAPSULES BY MOUTH ONCE NIGHTLY 60 capsule 1    celecoxib (CELEBREX) 200 MG capsule Take 1 capsule by mouth daily 60 capsule 1    traMADol (ULTRAM) 50 MG tablet Take 1 tablet by mouth every 6 hours as needed for Pain (max 1-2 per day) for up to 42 days. 90 tablet 0    pregabalin (LYRICA) 75 MG capsule TAKE ONE CAPSULE BY MOUTH EVERY MORNING AND TAKE TWO CAPSULES BY MOUTH EVERY EVENING 90 capsule 1     No current facility-administered medications for this visit. Goals of current treatment regimen include improvement in pain, restoration of functioning- with focus on improvement in physical performance, general activity, work or disability,emotional distress, health care utilization and  decreased medication consumption. Will continue to monitor progress towards achieving/maintaining therapeutic goals with special emphasis on  1. Improvement in perceived interfernce  of pain with ADL's. Ability to do home exercises independently. Ability to do household chores indoor and/or outdoor work and social and leisure activities. To increase flexibility/ROM, strength and endurance. Improve psychosocial and physical functioning.- she is not showing any significant progress/or showing regression  towards this goal and reassessment and adjustment of goals/treatment have been made. 2. Improving sleep to 6-7 hours a night. Improve mood/ anxiety and depression symptoms such as crying spells, low energy, problems with concentration, motivation. - she is showing progression towards this treatment goal with the current regimen. 3. Reduction of reliance on opioid analgesia/more appropriate opioid use. - she is showing progression towards this treatment goal with the current regimen. 4. Ability to focus/concentrate at work and perform the duties required of her at work  Sit through a workday without lower extremity symptoms. Stand 30-60 minutes without lower extremity symptoms. Ability to lift up to 10-20 lbs. Ability to go up and down stairs. Sit 30-60 minutes  Without having to stand up frequently. - she is maintaining/progressing towards her work related goals with the current regimen. Risks and benefits of the medications and other alternative treatments have been/were  discussed with the patient. Any questions on the  common side effects of these medications were also answered.   She was advised against drinking alcohol with the narcotic pain medicines, advised against driving or handling machinery when  starting or adjusting the dose of medicines, feeling groggy or drowsy, or if having any cognitive issues related to the current medications. Sheis fully aware of the risk of overdose and death, if medicines are misused and not taken as prescribed. If she develops new symptoms or if the symptoms worsen, she was told to call the office. .  Thank you for allowing me to participate in the care of this patient.     Deborah Urban MD.    Cc: Elba Rodriguez

## 2021-05-26 ENCOUNTER — PATIENT MESSAGE (OUTPATIENT)
Dept: PAIN MANAGEMENT | Age: 51
End: 2021-05-26

## 2021-06-01 ENCOUNTER — VIRTUAL VISIT (OUTPATIENT)
Dept: PAIN MANAGEMENT | Age: 51
End: 2021-06-01
Payer: COMMERCIAL

## 2021-06-01 DIAGNOSIS — M54.16 LUMBAR RADICULOPATHY: ICD-10-CM

## 2021-06-01 DIAGNOSIS — M47.816 FACET SYNDROME, LUMBAR: ICD-10-CM

## 2021-06-01 DIAGNOSIS — M79.7 FIBROMYALGIA: ICD-10-CM

## 2021-06-01 DIAGNOSIS — G89.4 CHRONIC PAIN SYNDROME: ICD-10-CM

## 2021-06-01 DIAGNOSIS — M16.11 PRIMARY OSTEOARTHRITIS OF RIGHT HIP: ICD-10-CM

## 2021-06-01 DIAGNOSIS — M96.1 FAILED BACK SURGICAL SYNDROME: ICD-10-CM

## 2021-06-01 DIAGNOSIS — R10.84 GENERALIZED ABDOMINAL PAIN: ICD-10-CM

## 2021-06-01 DIAGNOSIS — M51.36 DDD (DEGENERATIVE DISC DISEASE), LUMBAR: ICD-10-CM

## 2021-06-01 PROCEDURE — 99213 OFFICE O/P EST LOW 20 MIN: CPT | Performed by: INTERNAL MEDICINE

## 2021-06-01 RX ORDER — HYDROCODONE BITARTRATE AND ACETAMINOPHEN 5; 325 MG/1; MG/1
1 TABLET ORAL EVERY 6 HOURS PRN
Qty: 90 TABLET | Refills: 0 | Status: SHIPPED | OUTPATIENT
Start: 2021-06-01 | End: 2021-07-06 | Stop reason: SDUPTHER

## 2021-07-06 ENCOUNTER — VIRTUAL VISIT (OUTPATIENT)
Dept: PAIN MANAGEMENT | Age: 51
End: 2021-07-06
Payer: COMMERCIAL

## 2021-07-06 DIAGNOSIS — R10.84 GENERALIZED ABDOMINAL PAIN: ICD-10-CM

## 2021-07-06 DIAGNOSIS — M79.7 FIBROMYALGIA: ICD-10-CM

## 2021-07-06 DIAGNOSIS — M16.11 PRIMARY OSTEOARTHRITIS OF RIGHT HIP: ICD-10-CM

## 2021-07-06 DIAGNOSIS — M47.816 FACET SYNDROME, LUMBAR: ICD-10-CM

## 2021-07-06 DIAGNOSIS — M96.1 FAILED BACK SURGICAL SYNDROME: ICD-10-CM

## 2021-07-06 DIAGNOSIS — M54.16 LUMBAR RADICULOPATHY: ICD-10-CM

## 2021-07-06 DIAGNOSIS — G89.4 CHRONIC PAIN SYNDROME: ICD-10-CM

## 2021-07-06 DIAGNOSIS — M51.36 DDD (DEGENERATIVE DISC DISEASE), LUMBAR: ICD-10-CM

## 2021-07-06 PROCEDURE — 99213 OFFICE O/P EST LOW 20 MIN: CPT | Performed by: INTERNAL MEDICINE

## 2021-07-06 RX ORDER — HYDROCODONE BITARTRATE AND ACETAMINOPHEN 5; 325 MG/1; MG/1
1 TABLET ORAL EVERY 6 HOURS PRN
Qty: 70 TABLET | Refills: 0 | Status: SHIPPED | OUTPATIENT
Start: 2021-07-06 | End: 2021-08-09 | Stop reason: ALTCHOICE

## 2021-07-06 NOTE — PROGRESS NOTES
TELE HEALTH VISIT (AUDIO-VISUAL)    Pursuant to the emergency declaration under the [facility], [facility] waiver authority and the [employer] and [employer] Act, this Virtual  Visit was conducted, with patient's/legal guardian's consent, to reduce the patient's risk of exposure to COVID-19 and provide continuity of care for an established patient. Service is  provided through a video synchronous discussion virtually to substitute for in-person clinic visit. Due to this being a TeleHealth encounter (During ATJJP-68 public health emergency), evaluation of the following organ systems was limited: Vitals/Constitutional/EENT/Resp/CV/GI//MS/Neuro/Skin/Enua-Yzjs-Yjo. [facility]  1970  [id number]    Ms. Michelle Horvath is being seen virtually for a follow up visit using one of the following techniques  Google Duo Face time Doxy. me  Informed verbal consent to the virtual visit was obtained from Ms. Michelle Horvath. Risks associated with HIPPA compliance with the virtual visit was explained to the patient. Ms. Michelle Horvath is at her residence and Dr. Wes Parra is in his office. HISTORY OF PRESENT ILLNESS:  Ms. Michelle Horvath is a 44 y.o. female returns for a follow up visit for multiple medical problems. Her current presenting problems are   1. Chronic pain syndrome    2. DDD (degenerative disc disease), lumbar    3. Lumbar radiculopathy    4. Primary osteoarthritis of right hip    5. Facet syndrome, lumbar    6. Fibromyalgia    7. Generalized abdominal pain    8. Failed back surgical syndrome    . As per information/history obtained from the PADT(patient assessment and documentation tool) - She complains of pain in the lower back with radiation to the hips Right She rates the pain 6/10 and describes it as aching, piercing. Pain is made worse by: walking, standing. Current treatment regimen has helped relieve about 70% of the pain.   She denies side effects from the current pain regimen. Patient reports that since the last follow up visit the physical functioning is unchanged, family/social relationships are unchanged, mood is unchanged and sleep patterns are unchanged, and that the overall functioning is unchanged. Patient denies neurological bowel or bladder. Patient denies misusing/abusing her narcotic pain medications or using any illegal drugs. There are No indicators for possible drug abuse, addiction or diversion problems. Upon obtaining the medical history from Ms. Drake Rodriguez regarding today's office visit for her presenting problems, patient states she has been doing fair. She states she is status post total hip replacement, she states she is 2-1/2 weeks post op surgery, she was given Oxycodone surgery. Patient reports she started walking some. Ms. Drake Rodriguez states her pain is about the same. She states she has around 1 weeks worth of Norco left. ALLERGIES: Patients list of allergies were reviewed     MEDICATIONS: Ms. Drake Rodriguez list of medications were reviewed. Her current medications are   Outpatient Medications Prior to Visit   Medication Sig Dispense Refill    HYDROcodone-acetaminophen (NORCO) 5-325 MG per tablet Take 1 tablet by mouth every 6 hours as needed for Pain (max 2-3 per day) for up to 35 days. 90 tablet 0    nortriptyline (PAMELOR) 25 MG capsule TAKE ONE TO TWO CAPSULES BY MOUTH ONCE NIGHTLY 60 capsule 1    celecoxib (CELEBREX) 200 MG capsule Take 1 capsule by mouth daily 60 capsule 1    pregabalin (LYRICA) 75 MG capsule TAKE ONE CAPSULE BY MOUTH EVERY MORNING AND TAKE TWO CAPSULES BY MOUTH EVERY EVENING 90 capsule 1     No facility-administered medications prior to visit. REVIEW OF SYSTEMS:    Respiratory: Negative for apnea, chest tightness and shortness of breath or change in baseline breathing. PHYSICAL EXAM:   Nursing note and vitals reviewed. There were no vitals taken for this visit.   Constitutional: She appears well-developed and well-nourished. No acute distress. Cardiovascular: Normal rate, regular rhythm, normal heart sounds, and does not have murmur. Pulmonary/Chest: Effort normal. No respiratory distress. She does not have wheezes in the lung fields. She has no rales. Neurological/Psychiatric:She is alert and oriented to person, place, and time. Coordination is  normal.  Her mood isAppropriate and affect is Neutral/Euthymic(normal) . Her    IMPRESSION:   1. Chronic pain syndrome    2. DDD (degenerative disc disease), lumbar    3. Lumbar radiculopathy    4. Primary osteoarthritis of right hip    5. Facet syndrome, lumbar    6. Fibromyalgia    7. Generalized abdominal pain    8. Failed back surgical syndrome        PLAN:  Informed verbal consent was obtained  -[place] record was obtained and reviewed  for the last one year and no indicators of drug misuse  were found. Any other controlled substance prescriptions  seen on the record have been accounted for, I am aware of the patient receiving these medications. Andres Garza [place] record will be rechecked as part of office protocol. -ROM/Stretching exercises as advised   -She was advised to increase fluids ( 5-7  glasses of fluid daily), limit caffeine, avoid cheese products, increase dietary fiber, increase activity and exercise as tolerated and relax regularly and enjoy meals   -advised to walk 20-30 minutes daily as tolerated   -continue with Norco 2-3 per day  -Continue with all other adjuvants     Current Outpatient Medications   Medication Sig Dispense Refill    HYDROcodone-acetaminophen (NORCO) 5-325 MG per tablet Take 1 tablet by mouth every 6 hours as needed for Pain (max 2-3 per day) for up to 35 days.  90 tablet 0    nortriptyline (PAMELOR) 25 MG capsule TAKE ONE TO TWO CAPSULES BY MOUTH ONCE NIGHTLY 60 capsule 1    celecoxib (CELEBREX) 200 MG capsule Take 1 capsule by mouth daily 60 capsule 1    pregabalin (LYRICA) 75 MG capsule TAKE ONE CAPSULE BY MOUTH EVERY MORNING AND TAKE TWO CAPSULES BY MOUTH EVERY EVENING 90 capsule 1     No current facility-administered medications for this visit. I will continue her current medication regimen  which is part of the above treatment schedule. It has been helping with Ms. Sánchez's chronic  medical problems which for this visit include:   Diagnoses of Chronic pain syndrome, DDD (degenerative disc disease), lumbar, Lumbar radiculopathy, Primary osteoarthritis of right hip, Facet syndrome, lumbar, Fibromyalgia, Generalized abdominal pain, and Failed back surgical syndrome were pertinent to this visit. Risks and benefits of the medications and other alternative treatments  including no treatment were discussed with the patient. The common side effects of these medications were also explained to the patient. Informed verbal consent was obtained. Goals of current treatment regimen include improvement in pain, restoration of functioning- with focus on improvement in physical performance, general activity, work or disability,emotional distress, health care utilization and  decreased medication consumption. Will continue to monitor progress towards achieving/maintaining therapeutic goals with special emphasis on  1. Improvement in perceived interfernce  of pain with ADL's. Ability to do home exercises independently. Ability to do household chores indoor and/or outdoor work and social and leisure activities. Improve psychosocial and physical functioning. - she is showing progression towards this treatment goal with the current regimen. She was advised against drinking alcohol with the narcotic pain medicines, advised against driving or handling machinery while adjusting the dose of medicines or if having cognitive  issues related to the current medications. Risk of overdose and death, if medicines not taken as prescribed, were also discussed. If the patient develops new symptoms or if the symptoms worsen, the patient should call the office. While transcribing every attempt was made to maintain the accuracy of the note in terms of it's contents,there may have been some errors made inadvertently. Thank you for allowing me to participate in the care of this patient.     Porter Lucero MD.    Cc: Lisa Polanco

## 2021-07-20 ENCOUNTER — TELEPHONE (OUTPATIENT)
Dept: PAIN MANAGEMENT | Age: 51
End: 2021-07-20

## 2021-07-20 DIAGNOSIS — M79.7 FIBROMYALGIA: ICD-10-CM

## 2021-08-04 NOTE — TELEPHONE ENCOUNTER
Submitted PA for PREGABLIN  Via CMM Key: [place]: \"Approved, Coverage Starts on: 8/4/2021 12:00:00 AM, Coverage Ends on: 8/4/2022 \"    The patient was notified by .

## 2021-08-09 ENCOUNTER — OFFICE VISIT (OUTPATIENT)
Dept: PAIN MANAGEMENT | Age: 51
End: 2021-08-09
Payer: COMMERCIAL

## 2021-08-09 VITALS
TEMPERATURE: 97.6 F | DIASTOLIC BLOOD PRESSURE: 88 MMHG | HEART RATE: 103 BPM | WEIGHT: 233 LBS | SYSTOLIC BLOOD PRESSURE: 142 MMHG | HEIGHT: 67 IN | BODY MASS INDEX: 36.57 KG/M2

## 2021-08-09 DIAGNOSIS — M54.16 LUMBAR RADICULOPATHY: ICD-10-CM

## 2021-08-09 DIAGNOSIS — F51.01 PRIMARY INSOMNIA: ICD-10-CM

## 2021-08-09 DIAGNOSIS — M47.816 FACET SYNDROME, LUMBAR: ICD-10-CM

## 2021-08-09 DIAGNOSIS — G89.4 CHRONIC PAIN SYNDROME: ICD-10-CM

## 2021-08-09 DIAGNOSIS — M51.36 DDD (DEGENERATIVE DISC DISEASE), LUMBAR: ICD-10-CM

## 2021-08-09 DIAGNOSIS — M79.18 MYOFASCIAL PAIN: ICD-10-CM

## 2021-08-09 DIAGNOSIS — M79.7 FIBROMYALGIA: ICD-10-CM

## 2021-08-09 DIAGNOSIS — R10.84 GENERALIZED ABDOMINAL PAIN: ICD-10-CM

## 2021-08-09 DIAGNOSIS — M96.1 FAILED BACK SURGICAL SYNDROME: ICD-10-CM

## 2021-08-09 DIAGNOSIS — M16.11 PRIMARY OSTEOARTHRITIS OF RIGHT HIP: ICD-10-CM

## 2021-08-09 PROCEDURE — 99214 OFFICE O/P EST MOD 30 MIN: CPT | Performed by: INTERNAL MEDICINE

## 2021-08-09 RX ORDER — METHYLPREDNISOLONE 4 MG/1
TABLET ORAL
Qty: 1 KIT | Refills: 0 | Status: SHIPPED | OUTPATIENT
Start: 2021-08-09 | End: 2021-09-10

## 2021-08-09 RX ORDER — TRAMADOL HYDROCHLORIDE 50 MG/1
50 TABLET ORAL EVERY 6 HOURS PRN
Qty: 90 TABLET | Refills: 0 | Status: SHIPPED | OUTPATIENT
Start: 2021-08-09 | End: 2021-09-10 | Stop reason: SDUPTHER

## 2021-08-09 NOTE — PROGRESS NOTES
Ernestina Ferrell  1970  [id number]    HISTORY OF PRESENT ILLNESS:  Ms. My Carrera is a 44 y.o. female returns for a follow up visit for multiple medical problems. Her  presenting problems are   1. Chronic pain syndrome    2. Fibromyalgia    3. DDD (degenerative disc disease), lumbar    4. Lumbar radiculopathy    5. Primary osteoarthritis of right hip    6. Facet syndrome, lumbar    7. Generalized abdominal pain    8. Failed back surgical syndrome    9. Myofascial pain    10. Primary insomnia    . As per information/history obtained from the PADT(patient assessment and documentation tool) -  She complains of pain in the lower back She rates the pain 7/10 and describes it as sharp. Pain is made worse by: movement, standing, sitting, bending. Current treatment regimen has helped relieve about 30% of the pain. She denies side effects from the current pain regimen. Patient reports that since the last follow up visit the physical functioning is worse, family/social relationships are unchanged, mood is unchanged and sleep patterns are unchanged, and that the overall functioning is worse. Patient denies neurological bowel or bladder. Patient denies misusing/abusing her narcotic pain medications or using any illegal drugs. There are No indicators for possible drug abuse, addiction or diversion problems. Upon obtaining the medical history from Ms. My Carrera regarding today's office visit for her presenting problems, patient states she has been doing fair after the right hip surgery. She states she is now having left side lower back hurting with stabbing pains, \"it's killing me. \" Ms. My Carrera states she has been walking and swimming. She states she will be starting school. Patient states she is using Norco along with Celebrex. Patient states her sleep is fair. Has fairly normal sleep latency. Averages about 4-6 hours of sleep a night. Denies any signs of sleep apnea.  Feels somewhat rested in the morning, she is on Pamelor. She mentions she is using Lyrica 75 mg BID. ALLERGIES/PAST MED/FAM/SOC HISTORY: Ms. Lizzette Mendez allergies, past medical, family and social history were reviewed in the chart. Ms. Lizzette Mendez current medications are   Outpatient Medications Prior to Visit   Medication Sig Dispense Refill    nortriptyline (PAMELOR) 25 MG capsule TAKE ONE TO TWO CAPSULES BY MOUTH ONCE NIGHTLY 60 capsule 1    celecoxib (CELEBREX) 200 MG capsule Take 1 capsule by mouth daily 60 capsule 1    pregabalin (LYRICA) 75 MG capsule TAKE ONE CAPSULE BY MOUTH EVERY MORNING AND TAKE TWO CAPSULES BY MOUTH EVERY EVENING 90 capsule 1     No facility-administered medications prior to visit. REVIEW OF SYSTEMS: .   Respiratory: Negative for shortness of breath. Cardiovascular: Negative for chest pain, palpitations  Gastrointestinal: Negative for blood in stool, abdominal distention, nausea, vomiting, abdominal pain, diarrhea,constipation. Neurological: Negative for speech difficulty, weakness and light-headedness, dizziness, tremors, sleepiness  Psychiatric/Behavioral: Negative for suicidal ideas, hallucinations, behavioral problems, self-injury, decreased concentration/cognition, agitation, confusion. PHYSICAL EXAM:   Nursing note and vitals reviewed. BP (!) 142/88   Pulse 103   Temp 97.6 °F (36.4 °C)   Ht 5' 7\" (1.702 m)   Wt 233 lb (105.7 kg)   BMI 36.49 kg/m²   General Appearance: Patient is well nourished, well developed, well groomed and in no acute distress. Skin: Skin is warm and dry, good turgor . No rash or lesions noted. She is not diaphoretic. Pulmonary/Chest: Effort normal. No respiratory distress or use of accessory muscles. Auscultation revealing normal air entry. She does not have wheezes in the lung fields. She has no rales. Cardiovascular: Normal rate, regular rhythm, normal heart sounds, and does not have murmur. Exam reveals no gallop and no friction rub.     Musculoskeletal / Extremities: Range of motion is normal. Gait is normal, assistive devices use: none. She exhibits edema: none, and no tenderness. Neurological/Psychiatric:She is alert and oriented to person, place, and time. Coordination is  normal.   Judgement and Insight is normal  Her mood is Appropriate and affect is Neutral/Euthymic(normal) . Her behavior is normal.   thought content normal.        IMPRESSION:     1. Chronic pain syndrome    2. Fibromyalgia    3. DDD (degenerative disc disease), lumbar    4. Lumbar radiculopathy    5. Primary osteoarthritis of right hip    6. Facet syndrome, lumbar    7. Generalized abdominal pain    8. Failed back surgical syndrome    9. Myofascial pain    10. Primary insomnia        PLAN:  Informed verbal consent was obtained.  -continue with current opioid regimen   -Patient wants to discontinue Norco and go back to Ultram 2-3 per day  -Continue with Lyrica, will try increasing it to 225 mg  -Continue with Celebrex 200 mg daily  -Start Medrol pack to help with pain in the back  -Madelin exercises given   -she was advised proper sleep hygiene-told to avoid:use of caffeine or other stimulants after noon, alcohol use near bedtime, long or frequent naps during the day, erratic sleep schedule, heavy meals near bedtime, vigorous exercise near bedtime and use of electronic devices near bedtime   -Continue with Pamelor     Ms. Bebeto Che will be prescribed  the medications  listed below which are for treatment of her presenting  medical problems which for this visit include:   Diagnoses of Chronic pain syndrome, Fibromyalgia, DDD (degenerative disc disease), lumbar, Lumbar radiculopathy, Primary osteoarthritis of right hip, Facet syndrome, lumbar, Generalized abdominal pain, Failed back surgical syndrome, Myofascial pain, and Primary insomnia were pertinent to this visit.   Medications/orders associated with this visit:    Current Outpatient Medications   Medication Sig Dispense Refill    nortriptyline (PAMELOR) 25 MG capsule TAKE ONE TO TWO CAPSULES BY MOUTH ONCE NIGHTLY 60 capsule 1    celecoxib (CELEBREX) 200 MG capsule Take 1 capsule by mouth daily 60 capsule 1    pregabalin (LYRICA) 75 MG capsule TAKE ONE CAPSULE BY MOUTH EVERY MORNING AND TAKE TWO CAPSULES BY MOUTH EVERY EVENING 90 capsule 1     No current facility-administered medications for this visit. Goals of current treatment regimen include improvement in pain, restoration of functioning- with focus on improvement in physical performance, general activity, work or disability,emotional distress, health care utilization and  decreased medication consumption. Will continue to monitor progress towards achieving/maintaining therapeutic goals with special emphasis on  1. Improvement in perceived interfernce  of pain with ADL's. Ability to do home exercises independently. Ability to do household chores indoor and/or outdoor work and social and leisure activities. To increase flexibility/ROM, strength and endurance. Improve psychosocial and physical functioning.- she is not showing any significant progress/or showing regression  towards this goal and reassessment and adjustment of goals/treatment have been made. 2. Improving sleep to 6-7 hours a night. Improve mood/ anxiety and depression symptoms such as crying spells, low energy, problems with concentration, motivation. - she is showing progression towards this treatment goal with the current regimen. 3. Reduction of reliance on opioid analgesia/more appropriate opioid use. - she is showing progression towards this treatment goal with the current regimen. Risks and benefits of the medications and other alternative treatments have been/were  discussed with the patient. Any questions on the  common side effects of these medications were also answered.   She was advised against drinking alcohol with the narcotic pain medicines, advised against driving or handling machinery when  starting or adjusting the dose of medicines, feeling groggy or drowsy, or if having any cognitive issues related to the current medications. Sheis fully aware of the risk of overdose and death, if medicines are misused and not taken as prescribed. If she develops new symptoms or if the symptoms worsen, she was told to call the office. .  Thank you for allowing me to participate in the care of this patient.     Cynthia Tobias MD    Cc: Zeus Harris

## 2021-09-10 ENCOUNTER — OFFICE VISIT (OUTPATIENT)
Dept: PAIN MANAGEMENT | Age: 51
End: 2021-09-10
Payer: COMMERCIAL

## 2021-09-10 VITALS
WEIGHT: 233.4 LBS | SYSTOLIC BLOOD PRESSURE: 128 MMHG | BODY MASS INDEX: 36.56 KG/M2 | HEART RATE: 105 BPM | OXYGEN SATURATION: 96 % | TEMPERATURE: 98.8 F | DIASTOLIC BLOOD PRESSURE: 77 MMHG

## 2021-09-10 DIAGNOSIS — M47.816 FACET SYNDROME, LUMBAR: ICD-10-CM

## 2021-09-10 DIAGNOSIS — G89.4 CHRONIC PAIN SYNDROME: ICD-10-CM

## 2021-09-10 DIAGNOSIS — R10.84 GENERALIZED ABDOMINAL PAIN: ICD-10-CM

## 2021-09-10 DIAGNOSIS — M79.18 MYOFASCIAL PAIN: ICD-10-CM

## 2021-09-10 DIAGNOSIS — M96.1 FAILED BACK SURGICAL SYNDROME: ICD-10-CM

## 2021-09-10 DIAGNOSIS — M51.36 DDD (DEGENERATIVE DISC DISEASE), LUMBAR: ICD-10-CM

## 2021-09-10 DIAGNOSIS — M79.7 FIBROMYALGIA: ICD-10-CM

## 2021-09-10 DIAGNOSIS — M16.11 PRIMARY OSTEOARTHRITIS OF RIGHT HIP: ICD-10-CM

## 2021-09-10 DIAGNOSIS — M54.16 LUMBAR RADICULOPATHY: ICD-10-CM

## 2021-09-10 PROCEDURE — 99213 OFFICE O/P EST LOW 20 MIN: CPT | Performed by: INTERNAL MEDICINE

## 2021-09-10 RX ORDER — TRAMADOL HYDROCHLORIDE 50 MG/1
50 TABLET ORAL EVERY 6 HOURS PRN
Qty: 90 TABLET | Refills: 0 | Status: SHIPPED | OUTPATIENT
Start: 2021-09-10 | End: 2021-10-14 | Stop reason: SDUPTHER

## 2021-09-10 NOTE — PROGRESS NOTES
Carter Eleno  1970  [id number]      HISTORY OF PRESENT ILLNESS:  Ms. Johnny Zuniga is a 46 y.o. female returns for a follow up visit for pain management  She has a diagnosis of   1. Chronic pain syndrome    2. Fibromyalgia    3. DDD (degenerative disc disease), lumbar    4. Lumbar radiculopathy    5. Primary osteoarthritis of right hip    6. Facet syndrome, lumbar    7. Generalized abdominal pain    8. Failed back surgical syndrome    9. Myofascial pain    . She complains of pain in the Low back, right hip She rates the pain 3/10 and describes it as aching. Current treatment regimen has helped relieve about 40% of the pain. She denies any side effects from the current pain regimen. Patient reports that since the last follow up visit the physical functioning is unchanged, family/social relationships are unchanged, mood is unchanged sleep patterns are unchanged, and that the overall functioning is unchanged. Patient denies misusing/abusing her narcotic pain medications or using any illegal drugs. There are No indicators for possible drug abuse, addiction or diversion problems. Patient states she is status post total hip replacement, she states she having pain in the right hip still, it feels tender and hot, she has been icing it. Ms. Johnny Zuniga states she is using Tramadol 2 per day. Patient states Steroids did help with the left hip pain. She states she still feels sore in the back. ALLERGIES: Patients list of allergies were reviewed     MEDICATIONS: Ms. Johnny Zuniga list of medications were reviewed. Her current medications are   Outpatient Medications Prior to Visit   Medication Sig Dispense Refill    methylPREDNISolone (MEDROL, DORETHA,) 4 MG tablet Use as directed 1 kit 0    nortriptyline (PAMELOR) 25 MG capsule TAKE ONE TO TWO CAPSULES BY MOUTH ONCE NIGHTLY 60 capsule 1    celecoxib (CELEBREX) 200 MG capsule Take 1 capsule by mouth daily 60 capsule 1    pregabalin (LYRICA) 75 MG capsule TAKE ONE CAPSULE BY MOUTH EVERY MORNING AND TAKE TWO CAPSULES BY MOUTH EVERY EVENING 90 capsule 1     No facility-administered medications prior to visit. REVIEW OF SYSTEMS:    Respiratory: Negative for apnea, chest tightness and shortness of breath or change in baseline breathing. PHYSICAL EXAM:   Nursing note and vitals reviewed. /77   Pulse 105   Temp 98.8 °F (37.1 °C) (Temporal)   Wt 233 lb 6.4 oz (105.9 kg)   SpO2 96%   BMI 36.56 kg/m²   Constitutional: She appears well-developed and well-nourished. No acute distress. Cardiovascular: Normal rate, regular rhythm, normal heart sounds, and does not have murmur. Pulmonary/Chest: Effort normal. No respiratory distress. She does not have wheezes in the lung fields. She has no rales. Neurological/Psychiatric:She is alert and oriented to person, place, and time. Coordination is  normal.  Her mood isAppropriate and affect is Neutral/Euthymic(normal) . Her    IMPRESSION:   1. Chronic pain syndrome    2. Fibromyalgia    3. DDD (degenerative disc disease), lumbar    4. Lumbar radiculopathy    5. Primary osteoarthritis of right hip    6. Facet syndrome, lumbar    7. Generalized abdominal pain    8. Failed back surgical syndrome    9.  Myofascial pain        PLAN:  Informed verbal consent was obtained  -Continue with current opioid regimen   -Continue with Ultram 2-3 per day  -She was advised to increase fluids ( 5-7  glasses of fluid daily), limit caffeine, avoid cheese products, increase dietary fiber, increase activity and exercise as tolerated and relax regularly and enjoy meals   -Continue with all other adjuvant medications as before   -Follow up with Ortho regarding right hip issues     Current Outpatient Medications   Medication Sig Dispense Refill    methylPREDNISolone (MEDROL, DORETHA,) 4 MG tablet Use as directed 1 kit 0    nortriptyline (PAMELOR) 25 MG capsule TAKE ONE TO TWO CAPSULES BY MOUTH ONCE NIGHTLY 60 capsule 1    celecoxib (CELEBREX) 200 MG capsule Take 1 capsule by mouth daily 60 capsule 1    pregabalin (LYRICA) 75 MG capsule TAKE ONE CAPSULE BY MOUTH EVERY MORNING AND TAKE TWO CAPSULES BY MOUTH EVERY EVENING 90 capsule 1     No current facility-administered medications for this visit. I will continue her current medication regimen  which is part of the above treatment schedule. It has been helping with Ms. Sánchez's chronic  medical problems which for this visit include:   Diagnoses of Chronic pain syndrome, Fibromyalgia, DDD (degenerative disc disease), lumbar, Lumbar radiculopathy, Primary osteoarthritis of right hip, Facet syndrome, lumbar, Generalized abdominal pain, Failed back surgical syndrome, and Myofascial pain were pertinent to this visit. Risks and benefits of the medications and other alternative treatments  including no treatment were discussed with the patient. The common side effects of these medications were also explained to the patient. Informed verbal consent was obtained. Goals of current treatment regimen include improvement in pain, restoration of functioning- with focus on improvement in physical performance, general activity, work or disability,emotional distress, health care utilization and  decreased medication consumption. Will continue to monitor progress towards achieving/maintaining therapeutic goals with special emphasis on  1. Improvement in perceived interfernce  of pain with ADL's. Ability to do home exercises independently. Ability to do household chores indoor and/or outdoor work and social and leisure activities. Improve psychosocial and physical functioning. - she is showing progression towards this treatment goal with the current regimen. She was advised against drinking alcohol with the narcotic pain medicines, advised against driving or handling machinery while adjusting the dose of medicines or if having cognitive  issues related to the current medications. Risk of overdose and death, if medicines not taken as prescribed, were also discussed. If the patient develops new symptoms or if the symptoms worsen, the patient should call the office. While transcribing every attempt was made to maintain the accuracy of the note in terms of it's contents,there may have been some errors made inadvertently. Thank you for allowing me to participate in the care of this patient.     Jackson Guzman MD.    Cc: Toño Manriquez

## 2021-09-17 ENCOUNTER — TELEPHONE (OUTPATIENT)
Dept: PAIN MANAGEMENT | Age: 51
End: 2021-09-17

## 2021-09-17 NOTE — TELEPHONE ENCOUNTER
Submitted PA for TRAMADOL Via CMM Key: [id number] STATUS: \" Approved, Coverage Starts on: 9/17/2021 12:00:00 AM, Coverage Ends on: 3/16/2022 \"    The patient was notified by phone; I LVM.

## 2021-10-14 ENCOUNTER — OFFICE VISIT (OUTPATIENT)
Dept: PAIN MANAGEMENT | Age: 51
End: 2021-10-14
Payer: COMMERCIAL

## 2021-10-14 VITALS
TEMPERATURE: 98.1 F | DIASTOLIC BLOOD PRESSURE: 58 MMHG | WEIGHT: 230.4 LBS | HEART RATE: 89 BPM | OXYGEN SATURATION: 98 % | BODY MASS INDEX: 36.09 KG/M2 | SYSTOLIC BLOOD PRESSURE: 111 MMHG

## 2021-10-14 DIAGNOSIS — F51.01 PRIMARY INSOMNIA: ICD-10-CM

## 2021-10-14 DIAGNOSIS — M96.1 FAILED BACK SURGICAL SYNDROME: ICD-10-CM

## 2021-10-14 DIAGNOSIS — M51.36 DDD (DEGENERATIVE DISC DISEASE), LUMBAR: ICD-10-CM

## 2021-10-14 DIAGNOSIS — M54.16 LUMBAR RADICULOPATHY: ICD-10-CM

## 2021-10-14 DIAGNOSIS — M16.11 PRIMARY OSTEOARTHRITIS OF RIGHT HIP: ICD-10-CM

## 2021-10-14 DIAGNOSIS — M47.816 FACET SYNDROME, LUMBAR: ICD-10-CM

## 2021-10-14 DIAGNOSIS — R10.84 GENERALIZED ABDOMINAL PAIN: ICD-10-CM

## 2021-10-14 DIAGNOSIS — G89.4 CHRONIC PAIN SYNDROME: ICD-10-CM

## 2021-10-14 DIAGNOSIS — M79.18 MYOFASCIAL PAIN: ICD-10-CM

## 2021-10-14 DIAGNOSIS — M79.7 FIBROMYALGIA: ICD-10-CM

## 2021-10-14 PROCEDURE — 99214 OFFICE O/P EST MOD 30 MIN: CPT | Performed by: INTERNAL MEDICINE

## 2021-10-14 RX ORDER — TRAMADOL HYDROCHLORIDE 50 MG/1
50 TABLET ORAL EVERY 6 HOURS PRN
Qty: 90 TABLET | Refills: 0 | Status: SHIPPED | OUTPATIENT
Start: 2021-10-14 | End: 2021-10-14 | Stop reason: ALTCHOICE

## 2021-10-14 RX ORDER — NORTRIPTYLINE HYDROCHLORIDE 25 MG/1
25-50 CAPSULE ORAL NIGHTLY
Qty: 60 CAPSULE | Refills: 1 | Status: SHIPPED | OUTPATIENT
Start: 2021-10-14 | End: 2021-11-18 | Stop reason: SDUPTHER

## 2021-10-14 RX ORDER — TRAMADOL HYDROCHLORIDE 50 MG/1
50 TABLET ORAL EVERY 6 HOURS PRN
Qty: 70 TABLET | Refills: 0 | Status: SHIPPED | OUTPATIENT
Start: 2021-10-14 | End: 2021-11-18 | Stop reason: SDUPTHER

## 2021-10-14 NOTE — PROGRESS NOTES
Jaison Nice  1970  [id number]    HISTORY OF PRESENT ILLNESS:  Ms. Triston Rincon is a 46 y.o. female returns for a follow up visit for multiple medical problems. Her  presenting problems are   1. Chronic pain syndrome    2. Fibromyalgia    3. DDD (degenerative disc disease), lumbar    4. Lumbar radiculopathy    5. Primary osteoarthritis of right hip    6. Failed back surgical syndrome    7. Facet syndrome, lumbar    8. Myofascial pain    9. Generalized abdominal pain    . As per information/history obtained from the PADT(patient assessment and documentation tool) -  She complains of pain in the lower back with radiation to the buttocks She rates the pain 3/10 and describes it as aching. Pain is made worse by: movement. Current treatment regimen has helped relieve about 50% of the pain. She denies side effects from the current pain regimen. Patient reports that since the last follow up visit the physical functioning is unchanged, family/social relationships are unchanged, mood is unchanged and sleep patterns are unchanged, and that the overall functioning is unchanged. Patient denies neurological bowel or bladder. Patient denies misusing/abusing her narcotic pain medications or using any illegal drugs. There are No indicators for possible drug abuse, addiction or diversion problems. Upon obtaining the medical history from Ms. Triston Rincon regarding today's office visit for her presenting problems,  complains she is having pain in there right hip, status post total hip replacement. She complains her back still hurts along with her arms. She mentions she saw ortho for on going pain. She says she had labs done, showed Increase CRP, normal ESR. She reports she is using Celebrex along with Ultram 1-2 per day. Patient states she sleeps well. Has normal sleep latency. Averages about 5-7 hours of sleep a night. Denies any signs of sleep apnea. Feels rested in the AM. Denies any sleep attacks during the day. Medication regimen helps with maintaining/regulating sleep. She says she is using Pamelor. Patient's  subjective report of her mood is fair. she describes occasional symptoms of depression, occasional  irritability and some mood swings. Describes her mood as being neutral and reports some pleasure in her daily activities. Reports  fair  appetite, energy and concentration. Able to function well in different aspects of her daily activities. Denies suicidal or homicidal ideation. Denies any complaints of increased tension, does   Worry sometimes and occasional  irritability  she denies any c/o increased anxiety, No c/o panic attacks or symptoms of PTSD. She reports her weight has been stable. She states she is working full time. She mentions she had dental extractions recently     ALLERGIES/PAST MED/FAM/SOC HISTORY: Ms. Tacos Avendaño allergies, past medical, family and social history were reviewed in the chart. Ms. Tacos Avendaño current medications are   Outpatient Medications Prior to Visit   Medication Sig Dispense Refill    nortriptyline (PAMELOR) 25 MG capsule TAKE ONE TO TWO CAPSULES BY MOUTH ONCE NIGHTLY 60 capsule 1    celecoxib (CELEBREX) 200 MG capsule Take 1 capsule by mouth daily 60 capsule 1    pregabalin (LYRICA) 75 MG capsule TAKE ONE CAPSULE BY MOUTH EVERY MORNING AND TAKE TWO CAPSULES BY MOUTH EVERY EVENING 90 capsule 1     No facility-administered medications prior to visit. REVIEW OF SYSTEMS: .   Respiratory: Negative for shortness of breath. Cardiovascular: Negative for chest pain, palpitations  Gastrointestinal: Negative for blood in stool, abdominal distention, nausea, vomiting, abdominal pain, diarrhea,constipation. Neurological: Negative for speech difficulty, weakness and light-headedness, dizziness, tremors, sleepiness  Psychiatric/Behavioral: Negative for suicidal ideas, hallucinations, behavioral problems, self-injury, decreased concentration/cognition, agitation, confusion.      PHYSICAL EXAM:   Nursing note and vitals reviewed. BP (!) 111/58   Pulse 89   Temp 98.1 °F (36.7 °C) (Temporal)   Wt 230 lb 6.4 oz (104.5 kg)   SpO2 98%   BMI 36.09 kg/m²   General Appearance: Patient is well nourished, well developed, well groomed and in no acute distress. Skin: Skin is warm and dry, good turgor . No rash or lesions noted. She is not diaphoretic. Pulmonary/Chest: Effort normal. No respiratory distress or use of accessory muscles. Auscultation revealing normal air entry. She does not have wheezes in the lung fields. She has no rales. Cardiovascular: Normal rate, regular rhythm, normal heart sounds, and does not have murmur. Exam reveals no gallop and no friction rub. Musculoskeletal / Extremities: Range of motion is normal. Gait is normal, assistive devices use: none. She exhibits edema: none, and no tenderness. Neurological/Psychiatric:She is alert and oriented to person, place, and time. Coordination is  normal.   Judgement and Insight is normal  Her mood is Appropriate and affect is Neutral/Euthymic(normal) . Her behavior is normal.   thought content normal.        IMPRESSION:     1. Chronic pain syndrome    2. Fibromyalgia    3. DDD (degenerative disc disease), lumbar    4. Lumbar radiculopathy    5. Primary osteoarthritis of right hip    6. Failed back surgical syndrome    7. Facet syndrome, lumbar    8. Myofascial pain    9. Generalized abdominal pain    10. Primary insomnia        PLAN:  Informed verbal consent was obtained. -[facility] record was obtained and reviewed  for the last one year and no indicators of drug misuse  were found. Any other controlled substance prescriptions  seen on the record have been accounted for, I am aware of the patient receiving these medications. [place] [facility] record will be rechecked as part of office protocol.    -Interim history reviewed   -Most recent labs were reviewed and are within normal limits.  Will repeat them within next 9-12 months if there is no status change   -She was advised weight reduction, diet changes- 800-1200 maricarmen diet, diet diary, exercising, nutritional  consult increased physical activity as tolerated   -ROM/Stretching exercises as advised   -She was advised to increase fluids ( 5-7  glasses of fluid daily), limit caffeine, avoid cheese products, increase dietary fiber, increase activity and exercise as tolerated and relax regularly and enjoy meals   -Urine drug screen with GC/MS for opiates and drugs of abuse was ordered and will follow up on results.   -Continue with Lyrica same dose   -Continue with Celebrex 200 mgs   -Continue with Pamelor   -she was advised proper sleep hygiene-told to avoid:use of caffeine or other stimulants after noon, alcohol use near bedtime, long or frequent naps during the day, erratic sleep schedule, heavy meals near bedtime, vigorous exercise near bedtime and use of electronic devices near bedtime   Ms. Nuno Vale will be prescribed  the medications  listed below which are for treatment of her presenting  medical problems which for this visit include:   Diagnoses of Chronic pain syndrome, Fibromyalgia, DDD (degenerative disc disease), lumbar, Lumbar radiculopathy, Primary osteoarthritis of right hip, Failed back surgical syndrome, Facet syndrome, lumbar, Myofascial pain, and Generalized abdominal pain were pertinent to this visit. Medications/orders associated with this visit:    Current Outpatient Medications   Medication Sig Dispense Refill    nortriptyline (PAMELOR) 25 MG capsule TAKE ONE TO TWO CAPSULES BY MOUTH ONCE NIGHTLY 60 capsule 1    celecoxib (CELEBREX) 200 MG capsule Take 1 capsule by mouth daily 60 capsule 1    pregabalin (LYRICA) 75 MG capsule TAKE ONE CAPSULE BY MOUTH EVERY MORNING AND TAKE TWO CAPSULES BY MOUTH EVERY EVENING 90 capsule 1     No current facility-administered medications for this visit.         Goals of current treatment regimen include improvement in pain, restoration of functioning- with focus on improvement in physical performance, general activity, work or disability,emotional distress, health care utilization and  decreased medication consumption. Will continue to monitor progress towards achieving/maintaining therapeutic goals with special emphasis on  1. Improvement in perceived interfernce  of pain with ADL's. Ability to do home exercises independently. Ability to do household chores indoor and/or outdoor work and social and leisure activities. To increase flexibility/ROM, strength and endurance. Improve psychosocial and physical functioning.- she is showing progression towards this treatment goal with the current regimen. 2. Improving sleep to 6-7 hours a night. Improve mood/ anxiety and depression symptoms such as crying spells, low energy, problems with concentration, motivation. - she is showing progression towards this treatment goal with the current regimen. 3. Reduction of reliance on opioid analgesia/more appropriate opioid use. - she is showing progression towards this treatment goal with the current regimen. 4. Ability to focus/concentrate at work and perform the duties required of her at work  Sit through a workday without lower extremity symptoms. Stand 30-60 minutes without lower extremity symptoms. Ability to lift up to 10-20 lbs. Ability to go up and down stairs. Sit 30-60 minutes  Without having to stand up frequently. - she is maintaining/progressing towards her work related goals with the current regimen. Risks and benefits of the medications and other alternative treatments have been/were  discussed with the patient. Any questions on the  common side effects of these medications were also answered.   She was advised against drinking alcohol with the narcotic pain medicines, advised against driving or handling machinery when  starting or adjusting the dose of medicines, feeling groggy or drowsy, or if having any cognitive issues related to the current medications. Sheis fully aware of the risk of overdose and death, if medicines are misused and not taken as prescribed. If she develops new symptoms or if the symptoms worsen, she was told to call the office. .  Thank you for allowing me to participate in the care of this patient.     Shanell Lira MD    Cc: Nikole Dunn

## 2021-11-18 ENCOUNTER — OFFICE VISIT (OUTPATIENT)
Dept: PAIN MANAGEMENT | Age: 51
End: 2021-11-18
Payer: COMMERCIAL

## 2021-11-18 VITALS
BODY MASS INDEX: 36.49 KG/M2 | WEIGHT: 233 LBS | OXYGEN SATURATION: 98 % | TEMPERATURE: 97.7 F | DIASTOLIC BLOOD PRESSURE: 78 MMHG | HEART RATE: 101 BPM | SYSTOLIC BLOOD PRESSURE: 110 MMHG

## 2021-11-18 DIAGNOSIS — M51.36 DDD (DEGENERATIVE DISC DISEASE), LUMBAR: ICD-10-CM

## 2021-11-18 DIAGNOSIS — M54.16 LUMBAR RADICULOPATHY: ICD-10-CM

## 2021-11-18 DIAGNOSIS — M96.1 FAILED BACK SURGICAL SYNDROME: ICD-10-CM

## 2021-11-18 DIAGNOSIS — R10.84 GENERALIZED ABDOMINAL PAIN: ICD-10-CM

## 2021-11-18 DIAGNOSIS — G89.4 CHRONIC PAIN SYNDROME: ICD-10-CM

## 2021-11-18 DIAGNOSIS — M47.816 FACET SYNDROME, LUMBAR: ICD-10-CM

## 2021-11-18 DIAGNOSIS — M16.11 PRIMARY OSTEOARTHRITIS OF RIGHT HIP: ICD-10-CM

## 2021-11-18 DIAGNOSIS — M79.7 FIBROMYALGIA: ICD-10-CM

## 2021-11-18 PROCEDURE — 99213 OFFICE O/P EST LOW 20 MIN: CPT | Performed by: INTERNAL MEDICINE

## 2021-11-18 RX ORDER — PREGABALIN 75 MG/1
CAPSULE ORAL
Qty: 90 CAPSULE | Refills: 1 | Status: SHIPPED | OUTPATIENT
Start: 2021-11-18 | End: 2021-12-23 | Stop reason: SDUPTHER

## 2021-11-18 RX ORDER — CELECOXIB 200 MG/1
200 CAPSULE ORAL DAILY
Qty: 60 CAPSULE | Refills: 1 | Status: SHIPPED | OUTPATIENT
Start: 2021-11-18 | End: 2021-12-23 | Stop reason: SDUPTHER

## 2021-11-18 RX ORDER — NORTRIPTYLINE HYDROCHLORIDE 25 MG/1
25-50 CAPSULE ORAL NIGHTLY
Qty: 60 CAPSULE | Refills: 1 | Status: SHIPPED | OUTPATIENT
Start: 2021-11-18 | End: 2021-12-23 | Stop reason: SDUPTHER

## 2021-11-18 RX ORDER — ESTRADIOL 0.1 MG/G
0.5 CREAM VAGINAL
COMMUNITY
Start: 2021-04-22

## 2021-11-18 RX ORDER — FLUTICASONE PROPIONATE 50 MCG
2 SPRAY, SUSPENSION (ML) NASAL DAILY
COMMUNITY

## 2021-11-18 RX ORDER — TRAMADOL HYDROCHLORIDE 50 MG/1
50 TABLET ORAL EVERY 6 HOURS PRN
Qty: 105 TABLET | Refills: 0 | Status: SHIPPED | OUTPATIENT
Start: 2021-11-18 | End: 2021-12-23

## 2021-11-18 NOTE — PROGRESS NOTES
Mallorie Worrell  1970  [id number]      HISTORY OF PRESENT ILLNESS:  Ms. Chad Dunaway is a 46 y.o. female returns for a follow up visit for pain management  She has a diagnosis of   1. Chronic pain syndrome    2. Lumbar radiculopathy    3. Facet syndrome, lumbar    4. Fibromyalgia    5. Primary osteoarthritis of right hip    6. DDD (degenerative disc disease), lumbar    . She complains of pain in the right arm(s): upper, bilateral lower back and right hip She rates the pain 5/10 and describes it as aching, stabbing. Current treatment regimen has helped relieve about 30% of the pain. She denies any side effects from the current pain regimen. Patient reports that since the last follow up visit the physical functioning is unchanged, family/social relationships are unchanged, mood is unchanged sleep patterns are unchanged, and that the overall functioning is unchanged. There are No indicators for possible drug abuse, addiction or diversion problems. Upon obtaining the medical history from Ms. Chad Dunaway regarding today's office visit for her presenting problems, patient states her right hip is still hurting, 5 months post op total hip replacement. She mentions she is getting labs to R/O infection. She says she is using Ultram 2-3 per day feels more 3 than 2 per day. She complains she is having apin in the left hip and right elbow also. She denies any side effects. She reports her weight has been stable. She says she is working full time. ALLERGIES: Patients list of allergies were reviewed     MEDICATIONS: Ms. Chad Dunaway list of medications were reviewed. Her current medications are   Outpatient Medications Prior to Visit   Medication Sig Dispense Refill    estradiol (ESTRACE) 0.1 MG/GM vaginal cream Place 0.5 g vaginally      fluticasone (FLONASE) 50 MCG/ACT nasal spray 2 sprays by Nasal route daily      nortriptyline (PAMELOR) 25 MG capsule Take 1-2 capsules by mouth nightly 60 capsule 1    traMADol (ULTRAM) 50 MG tablet Take 1 tablet by mouth every 6 hours as needed for Pain (max 2-3 per day) for up to 35 days. 70 tablet 0    celecoxib (CELEBREX) 200 MG capsule Take 1 capsule by mouth daily 60 capsule 1    pregabalin (LYRICA) 75 MG capsule TAKE ONE CAPSULE BY MOUTH EVERY MORNING AND TAKE TWO CAPSULES BY MOUTH EVERY EVENING 90 capsule 1     No facility-administered medications prior to visit. REVIEW OF SYSTEMS:    Respiratory: Negative for apnea, chest tightness and shortness of breath or change in baseline breathing. PHYSICAL EXAM:   Nursing note and vitals reviewed. /78   Pulse 101   Temp 97.7 °F (36.5 °C) (Infrared)   Wt 233 lb (105.7 kg)   SpO2 98%   BMI 36.49 kg/m²   Constitutional: She appears well-developed and well-nourished. No acute distress. Cardiovascular: Normal rate, regular rhythm, normal heart sounds, and does not have murmur. Pulmonary/Chest: Effort normal. No respiratory distress. She does not have wheezes in the lung fields. She has no rales. Neurological/Psychiatric:She is alert and oriented to person, place, and time. Coordination is  normal.  Her mood isAppropriate and affect is Neutral/Euthymic(normal) . Other: + tenderness in right elbow   IMPRESSION:   1. Chronic pain syndrome    2. Lumbar radiculopathy    3. Facet syndrome, lumbar    4. Fibromyalgia    5. Primary osteoarthritis of right hip    6. DDD (degenerative disc disease), lumbar    7. Failed back surgical syndrome    8.  Generalized abdominal pain        PLAN:  Informed verbal consent was obtained  - ROM/Stretching exercises as advised   -She was advised to increase fluids ( 5-7  glasses of fluid daily), limit caffeine, avoid cheese products, increase dietary fiber, increase activity and exercise as tolerated and relax regularly and enjoy meals   -Continue with Ultram increase to 3 per day   -She was advised weight reduction, diet changes- 800-1200 maricarmen diet, diet diary, exercising, nutritional  consult increased physical activity as tolerated   -Exercises given for Elbow   -Interim history reviewed   -Most recent labs were reviewed and are within normal limits. Current Outpatient Medications   Medication Sig Dispense Refill    estradiol (ESTRACE) 0.1 MG/GM vaginal cream Place 0.5 g vaginally      fluticasone (FLONASE) 50 MCG/ACT nasal spray 2 sprays by Nasal route daily      nortriptyline (PAMELOR) 25 MG capsule Take 1-2 capsules by mouth nightly 60 capsule 1    traMADol (ULTRAM) 50 MG tablet Take 1 tablet by mouth every 6 hours as needed for Pain (max 2-3 per day) for up to 35 days. 70 tablet 0    celecoxib (CELEBREX) 200 MG capsule Take 1 capsule by mouth daily 60 capsule 1    pregabalin (LYRICA) 75 MG capsule TAKE ONE CAPSULE BY MOUTH EVERY MORNING AND TAKE TWO CAPSULES BY MOUTH EVERY EVENING 90 capsule 1     No current facility-administered medications for this visit. I will continue her current medication regimen  which is part of the above treatment schedule. It has been helping with Ms. Sánchez's chronic  medical problems which for this visit include:   Diagnoses of Chronic pain syndrome, Lumbar radiculopathy, Facet syndrome, lumbar, Fibromyalgia, Primary osteoarthritis of right hip, and DDD (degenerative disc disease), lumbar were pertinent to this visit. Risks and benefits of the medications and other alternative treatments  including no treatment were discussed with the patient. The common side effects of these medications were also explained to the patient. Informed verbal consent was obtained. Goals of current treatment regimen include improvement in pain, restoration of functioning- with focus on improvement in physical performance, general activity, work or disability,emotional distress, health care utilization and  decreased medication consumption. Will continue to monitor progress towards achieving/maintaining therapeutic goals with special emphasis on  1.  Improvement in perceived interfernce  of pain with ADL's. Ability to do home exercises independently. Ability to do household chores indoor and/or outdoor work and social and leisure activities. Improve psychosocial and physical functioning. - she is showing progression towards this treatment goal with the current regimen. She was advised against drinking alcohol with the narcotic pain medicines, advised against driving or handling machinery while adjusting the dose of medicines or if having cognitive  issues related to the current medications. Risk of overdose and death, if medicines not taken as prescribed, were also discussed. If the patient develops new symptoms or if the symptoms worsen, the patient should call the office. While transcribing every attempt was made to maintain the accuracy of the note in terms of it's contents,there may have been some errors made inadvertently. Thank you for allowing me to participate in the care of this patient.     Aniket Ivan MD.    Cc: Joey Max

## 2021-12-23 ENCOUNTER — OFFICE VISIT (OUTPATIENT)
Dept: PAIN MANAGEMENT | Age: 51
End: 2021-12-23
Payer: COMMERCIAL

## 2021-12-23 ENCOUNTER — TELEPHONE (OUTPATIENT)
Dept: PAIN MANAGEMENT | Age: 51
End: 2021-12-23

## 2021-12-23 VITALS
TEMPERATURE: 97.7 F | DIASTOLIC BLOOD PRESSURE: 74 MMHG | WEIGHT: 229 LBS | BODY MASS INDEX: 35.87 KG/M2 | HEART RATE: 107 BPM | OXYGEN SATURATION: 95 % | SYSTOLIC BLOOD PRESSURE: 118 MMHG

## 2021-12-23 DIAGNOSIS — M16.11 PRIMARY OSTEOARTHRITIS OF RIGHT HIP: ICD-10-CM

## 2021-12-23 DIAGNOSIS — M79.7 FIBROMYALGIA: ICD-10-CM

## 2021-12-23 DIAGNOSIS — M54.16 LUMBAR RADICULOPATHY: ICD-10-CM

## 2021-12-23 DIAGNOSIS — G89.4 CHRONIC PAIN SYNDROME: ICD-10-CM

## 2021-12-23 DIAGNOSIS — F51.01 PRIMARY INSOMNIA: ICD-10-CM

## 2021-12-23 DIAGNOSIS — M96.1 FAILED BACK SURGICAL SYNDROME: ICD-10-CM

## 2021-12-23 DIAGNOSIS — M51.36 DDD (DEGENERATIVE DISC DISEASE), LUMBAR: ICD-10-CM

## 2021-12-23 DIAGNOSIS — M47.816 FACET SYNDROME, LUMBAR: ICD-10-CM

## 2021-12-23 DIAGNOSIS — R10.84 GENERALIZED ABDOMINAL PAIN: ICD-10-CM

## 2021-12-23 DIAGNOSIS — M79.18 MYOFASCIAL PAIN: ICD-10-CM

## 2021-12-23 PROCEDURE — 99214 OFFICE O/P EST MOD 30 MIN: CPT | Performed by: INTERNAL MEDICINE

## 2021-12-23 RX ORDER — NORTRIPTYLINE HYDROCHLORIDE 25 MG/1
25-50 CAPSULE ORAL NIGHTLY
Qty: 60 CAPSULE | Refills: 1 | Status: SHIPPED | OUTPATIENT
Start: 2021-12-23 | End: 2022-04-11 | Stop reason: SDUPTHER

## 2021-12-23 RX ORDER — PREGABALIN 75 MG/1
CAPSULE ORAL
Qty: 90 CAPSULE | Refills: 1 | Status: SHIPPED | OUTPATIENT
Start: 2021-12-23 | End: 2022-03-17 | Stop reason: ALTCHOICE

## 2021-12-23 RX ORDER — CELECOXIB 200 MG/1
200 CAPSULE ORAL DAILY
Qty: 60 CAPSULE | Refills: 1 | Status: SHIPPED | OUTPATIENT
Start: 2021-12-23 | End: 2022-02-17 | Stop reason: SDUPTHER

## 2021-12-23 RX ORDER — HYDROCODONE BITARTRATE AND ACETAMINOPHEN 7.5; 325 MG/1; MG/1
1 TABLET ORAL 3 TIMES DAILY
Qty: 84 TABLET | Refills: 0 | Status: SHIPPED | OUTPATIENT
Start: 2021-12-23 | End: 2022-01-20 | Stop reason: SDUPTHER

## 2021-12-23 RX ORDER — DULOXETIN HYDROCHLORIDE 30 MG/1
30 CAPSULE, DELAYED RELEASE ORAL DAILY
Qty: 30 CAPSULE | Refills: 0 | Status: SHIPPED
Start: 2021-12-23 | End: 2022-01-20 | Stop reason: ALTCHOICE

## 2021-12-23 NOTE — PROGRESS NOTES
Vishal Mar  1970  [id number]    HISTORY OF PRESENT ILLNESS:  Ms. Eboni Redd is a 46 y.o. female returns for a follow up visit for multiple medical problems. Her current presenting problems are   1. Chronic pain syndrome    2. Facet syndrome, lumbar    3. Primary osteoarthritis of right hip    4. Failed back surgical syndrome    5. Myofascial pain    6. Lumbar radiculopathy    7. Fibromyalgia    8. DDD (degenerative disc disease), lumbar    9. Generalized abdominal pain    10. Primary insomnia    . As per information/history obtained from the PADT(patient assessment and documentation tool) - She complains of pain in the arms Right, lower back and hips Right. She rates the pain 8/10 and describes it as aching, stabbing. Pain is made worse by: movement. Current treatment regimen has helped relieve about 30% of the pain. She denies side effects from the current pain regimen. Patient reports that since the last follow up visit the physical functioning is worse, family/social relationships are unchanged, mood is unchanged and sleep patterns are worse, and that the overall functioning is worse. Patient denies neurological bowel or bladder. Patient denies misusing/abusing her narcotic pain medications or using any illegal drugs. There are No indicators for possible drug abuse, addiction or diversion problems. Upon obtaining the medical history from Ms. Eboni Redd regarding today's office visit for her presenting problems, patient states her right side lower back is hurting, she is having increased pain. Ms. Eboni Redd states her hip has been hurting also, she is going for bone scan, she is seeing Ortho. She states the Ultram is not helping much with the pain, she wants to change the pain medications. Patient mentions she is on Celebrex along with Lyrica. Patient states her sleep is fair. Has fairly normal sleep latency. Averages about 4-6 hours of sleep a night. Denies any signs of sleep apnea.  Feels somewhat rested in the morning, she is on Pamelor. She states that her moods have been depressed, tearful, labile with c/o loss of energy, having occasional crying spells. Denies being suicidal or homicidal.       ALLERGIES/PAST MED/FAM/SOC HISTORY: Ms. Edward Shi allergies, past medical, family and social history were reviewed in the chart. Ms. Edward Shi current medications are   Outpatient Medications Prior to Visit   Medication Sig Dispense Refill    estradiol (ESTRACE) 0.1 MG/GM vaginal cream Place 0.5 g vaginally      fluticasone (FLONASE) 50 MCG/ACT nasal spray 2 sprays by Nasal route daily      nortriptyline (PAMELOR) 25 MG capsule Take 1-2 capsules by mouth nightly 60 capsule 1    traMADol (ULTRAM) 50 MG tablet Take 1 tablet by mouth every 6 hours as needed for Pain (max 3 per day) for up to 35 days. 105 tablet 0    celecoxib (CELEBREX) 200 MG capsule Take 1 capsule by mouth daily 60 capsule 1    pregabalin (LYRICA) 75 MG capsule TAKE ONE CAPSULE BY MOUTH EVERY MORNING AND TAKE TWO CAPSULES BY MOUTH EVERY EVENING 90 capsule 1     No facility-administered medications prior to visit. REVIEW OF SYSTEMS:     Respiratory: Negative for shortness of breath. Cardiovascular: Negative for chest pain, palpitations  Gastrointestinal: Negative for blood in stool, abdominal distention, nausea, vomiting, abdominal pain, diarrhea,constipation. Neurological: Negative for speech difficulty, weakness and light-headedness, dizziness, tremors, sleepiness  Psychiatric/Behavioral: Negative for suicidal ideas, hallucinations, behavioral problems, self-injury, decreased concentration/cognition, agitation, confusion. PHYSICAL EXAM:   Nursing note and vitals reviewed. /74   Pulse 107   Temp 97.7 °F (36.5 °C)   Wt 229 lb (103.9 kg)   SpO2 95%   BMI 35.87 kg/m²   General Appearance: Patient is well nourished, well developed, well groomed and in no acute distress. Skin: Skin is warm and dry, good turgor .  No rash or lesions noted. She is not diaphoretic. Pulmonary/Chest: Effort normal. No respiratory distress or use of accessory muscles. Auscultation revealing normal air entry. She does not have wheezes in the lung fields. She has no rales. Cardiovascular: Normal rate, regular rhythm, normal heart sounds, and does not have murmur. Exam reveals no gallop and no friction rub. Musculoskeletal / Extremities: Range of motion is normal. Gait is normal, assistive devices use: none. She exhibits edema: none, and no tenderness. Neurological/Psychiatric:She is alert and oriented to person, place, and time. Coordination is  normal.   Judgement and Insight is normal  Her mood is Appropriate and affect is Neutral/Euthymic(normal) . Her behavior is normal.   thought content normal.        IMPRESSION:     1. Chronic pain syndrome    2. Facet syndrome, lumbar    3. Primary osteoarthritis of right hip    4. Failed back surgical syndrome    5. Myofascial pain    6. Lumbar radiculopathy    7. Fibromyalgia    8. DDD (degenerative disc disease), lumbar    9. Generalized abdominal pain    10. Primary insomnia        PLAN:  Informed verbal consent was obtained. -[place] record was obtained and reviewed  for the last one year and no indicators of drug misuse  were found. Any other controlled substance prescriptions  seen on the record have been accounted for, I am aware of the patient receiving these medications. Yajaira Estee  [facility] record will be rechecked as part of office protocol.    -Start Cymbalta 30 mg daily   -Continue with Celebrex 200 mg   -Discontinue Ultram  -Start Norco 7.5 mg TID PRN   -She was advised to increase fluids ( 5-7  glasses of fluid daily), limit caffeine, avoid cheese products, increase dietary fiber, increase activity and exercise as tolerated and relax regularly and enjoy meals   -CBT techniques- relaxation therapies such as biofeedback, mindfulness based stress reduction, imagery, cognitive restructuring, problem solving discussed with patient   -she was advised proper sleep hygiene-told to avoid:use of caffeine or other stimulants after noon, alcohol use near bedtime, long or frequent naps during the day, erratic sleep schedule, heavy meals near bedtime, vigorous exercise near bedtime and use of electronic devices near bedtime   -Walking 20-30 minutes daily as tolerated   -She was advised weight reduction, diet changes- 800-1200 maricarmen diet, diet diary, exercising, nutritional  consult increased physical activity as tolerated     Ms. Nuno Vale will be prescribed  the medications  listed below which are for treatment of her presenting  medical problems which for this visit include:   Diagnoses of Chronic pain syndrome, Facet syndrome, lumbar, Primary osteoarthritis of right hip, Failed back surgical syndrome, Myofascial pain, Lumbar radiculopathy, Fibromyalgia, DDD (degenerative disc disease), lumbar, Generalized abdominal pain, and Primary insomnia were pertinent to this visit. Medications/orders associated with this visit:    Current Outpatient Medications   Medication Sig Dispense Refill    estradiol (ESTRACE) 0.1 MG/GM vaginal cream Place 0.5 g vaginally      fluticasone (FLONASE) 50 MCG/ACT nasal spray 2 sprays by Nasal route daily      nortriptyline (PAMELOR) 25 MG capsule Take 1-2 capsules by mouth nightly 60 capsule 1    traMADol (ULTRAM) 50 MG tablet Take 1 tablet by mouth every 6 hours as needed for Pain (max 3 per day) for up to 35 days. 105 tablet 0    celecoxib (CELEBREX) 200 MG capsule Take 1 capsule by mouth daily 60 capsule 1    pregabalin (LYRICA) 75 MG capsule TAKE ONE CAPSULE BY MOUTH EVERY MORNING AND TAKE TWO CAPSULES BY MOUTH EVERY EVENING 90 capsule 1     No current facility-administered medications for this visit.         Goals of current treatment regimen include improvement in pain, restoration of functioning- with focus on improvement in physical performance, general activity, work or disability,emotional distress, health care utilization and  decreased medication consumption. Will continue to monitor progress towards achieving/maintaining therapeutic goals with special emphasis on  1. Improvement in perceived interfernce  of pain with ADL's. Ability to do home exercises independently. Ability to do household chores indoor and/or outdoor work and social and leisure activities. To increase flexibility/ROM, strength and endurance. Improve psychosocial and physical functioning.- she is not showing any significant progress/or showing regression  towards this goal and reassessment and adjustment of goals/treatment have been made. 2. Improving sleep to 6-7 hours a night. Improve mood/ anxiety and depression symptoms such as crying spells, low energy, problems with concentration, motivation. - she is showing progression towards this treatment goal with the current regimen. 3. Reduction of reliance on opioid analgesia/more appropriate opioid use. - she is showing progression towards this treatment goal with the current regimen. Risks and benefits of the medications and other alternative treatments have been/were  discussed with the patient. Any questions on the  common side effects of these medications were also answered. She was advised against drinking alcohol with the narcotic pain medicines, advised against driving or handling machinery when  starting or adjusting the dose of medicines, feeling groggy or drowsy, or if having any cognitive issues related to the current medications. Sheis fully aware of the risk of overdose and death, if medicines are misused and not taken as prescribed. If she develops new symptoms or if the symptoms worsen, she was told to call the office. .  Thank you for allowing me to participate in the care of this patient.     Krystina León MD.    Cc: Babita Alves

## 2021-12-23 NOTE — TELEPHONE ENCOUNTER
Submitted PA for HYDROCODONE  Via CMM Key: [id number] STATUS: \"Approved, Coverage Starts on: 12/23/2021 12:00:00 AM, Coverage Ends on: 6/21/2022\"    The patient was notified by .

## 2022-01-20 ENCOUNTER — OFFICE VISIT (OUTPATIENT)
Dept: PAIN MANAGEMENT | Age: 52
End: 2022-01-20
Payer: COMMERCIAL

## 2022-01-20 VITALS
HEART RATE: 104 BPM | BODY MASS INDEX: 36.21 KG/M2 | SYSTOLIC BLOOD PRESSURE: 148 MMHG | DIASTOLIC BLOOD PRESSURE: 97 MMHG | WEIGHT: 231.2 LBS | OXYGEN SATURATION: 97 %

## 2022-01-20 DIAGNOSIS — M54.16 LUMBAR RADICULOPATHY: ICD-10-CM

## 2022-01-20 DIAGNOSIS — G89.4 CHRONIC PAIN SYNDROME: ICD-10-CM

## 2022-01-20 DIAGNOSIS — M96.1 FAILED BACK SURGICAL SYNDROME: ICD-10-CM

## 2022-01-20 DIAGNOSIS — R10.84 GENERALIZED ABDOMINAL PAIN: ICD-10-CM

## 2022-01-20 DIAGNOSIS — M79.7 FIBROMYALGIA: ICD-10-CM

## 2022-01-20 DIAGNOSIS — M79.18 MYOFASCIAL PAIN: ICD-10-CM

## 2022-01-20 DIAGNOSIS — M51.36 DDD (DEGENERATIVE DISC DISEASE), LUMBAR: ICD-10-CM

## 2022-01-20 DIAGNOSIS — M47.816 FACET SYNDROME, LUMBAR: ICD-10-CM

## 2022-01-20 DIAGNOSIS — M16.11 PRIMARY OSTEOARTHRITIS OF RIGHT HIP: ICD-10-CM

## 2022-01-20 PROCEDURE — 99213 OFFICE O/P EST LOW 20 MIN: CPT | Performed by: INTERNAL MEDICINE

## 2022-01-20 RX ORDER — HYDROCODONE BITARTRATE AND ACETAMINOPHEN 7.5; 325 MG/1; MG/1
1 TABLET ORAL 3 TIMES DAILY
Qty: 84 TABLET | Refills: 0 | Status: SHIPPED | OUTPATIENT
Start: 2022-01-20 | End: 2022-02-17 | Stop reason: SDUPTHER

## 2022-01-20 RX ORDER — DULOXETIN HYDROCHLORIDE 60 MG/1
60 CAPSULE, DELAYED RELEASE ORAL DAILY
Qty: 30 CAPSULE | Refills: 1 | Status: SHIPPED | OUTPATIENT
Start: 2022-01-20 | End: 2022-04-11 | Stop reason: SDUPTHER

## 2022-01-20 NOTE — PROGRESS NOTES
Randi Beckmandeuce  1970  [id number]    HISTORY OF PRESENT ILLNESS:  Ms. Jose Sims is a 46 y.o. female returns for a follow up visit for multiple medical problems. Her current presenting problems are   1. Chronic pain syndrome    2. Primary osteoarthritis of right hip    3. Myofascial pain    4. Fibromyalgia    5. Facet syndrome, lumbar    6. Failed back surgical syndrome    7. Lumbar radiculopathy    8. DDD (degenerative disc disease), lumbar    9. Generalized abdominal pain    . As per information/history obtained from the PADT(patient assessment and documentation tool) - She complains of pain in the lower back with radiation to the abdomen She rates the pain 5/10 and describes it as sharp, aching. Pain is made worse by: nothing. Current treatment regimen has helped relieve about 30% of the pain. She denies side effects from the current pain regimen. Patient reports that since the last follow up visit the physical functioning is unchanged, family/social relationships are unchanged, mood is unchanged and sleep patterns are unchanged, and that the overall functioning is unchanged. Patient denies neurological bowel or bladder. Patient denies misusing/abusing her narcotic pain medications or using any illegal drugs. There are No indicators for possible drug abuse, addiction or diversion problems. Upon obtaining the medical history from Ms. Jose Sims regarding today's office visit for her presenting problems, patient states her lower back pain has been worse with pain coming and going, she states it has been worse since the hip surgery. Patient states Cymbalta 30 mg is not helping much. She states she is working full time still. Patient denies any constipation symptoms. ALLERGIES: Patients list of allergies were reviewed     MEDICATIONS: Ms. Jose Sims list of medications were reviewed. Her current medications are   Outpatient Medications Prior to Visit   Medication Sig Dispense Refill    pregabalin (Amrit Kumar) 75 MG capsule TAKE ONE CAPSULE BY MOUTH EVERY MORNING AND TAKE TWO CAPSULES BY MOUTH EVERY EVENING 90 capsule 1    celecoxib (CELEBREX) 200 MG capsule Take 1 capsule by mouth daily 60 capsule 1    nortriptyline (PAMELOR) 25 MG capsule Take 1-2 capsules by mouth nightly 60 capsule 1    HYDROcodone-acetaminophen (NORCO) 7.5-325 MG per tablet Take 1 tablet by mouth 3 times daily for 28 days. 84 tablet 0    DULoxetine (CYMBALTA) 30 MG extended release capsule Take 1 capsule by mouth daily 30 capsule 0    estradiol (ESTRACE) 0.1 MG/GM vaginal cream Place 0.5 g vaginally      fluticasone (FLONASE) 50 MCG/ACT nasal spray 2 sprays by Nasal route daily       No facility-administered medications prior to visit. REVIEW OF SYSTEMS:    Respiratory: Negative for apnea, chest tightness and shortness of breath or change in baseline breathing. PHYSICAL EXAM:   Nursing note and vitals reviewed. BP (!) 148/97   Pulse 104   Wt 231 lb 3.2 oz (104.9 kg)   SpO2 97%   BMI 36.21 kg/m²   Constitutional: She appears well-developed and well-nourished. No acute distress. Cardiovascular: Normal rate, regular rhythm, normal heart sounds, and does not have murmur. Pulmonary/Chest: Effort normal. No respiratory distress. She does not have wheezes in the lung fields. She has no rales. Neurological/Psychiatric:She is alert and oriented to person, place, and time. Coordination is  normal.  Her mood isAppropriate and affect is Neutral/Euthymic(normal) . IMPRESSION:   1. Chronic pain syndrome    2. Primary osteoarthritis of right hip    3. Myofascial pain    4. Fibromyalgia    5. Facet syndrome, lumbar    6. Failed back surgical syndrome    7. Lumbar radiculopathy    8. DDD (degenerative disc disease), lumbar    9.  Generalized abdominal pain        PLAN:  Informed verbal consent was obtained  -ROM/Stretching exercises as advised   -She was advised to increase fluids ( 5-7  glasses of fluid daily), limit caffeine, avoid cheese products, increase dietary fiber, increase activity and exercise as tolerated and relax regularly and enjoy meals   -Continue with Norco 3 per day  -She was advised weight reduction, diet changes- 800-1200 maricarmen diet, diet diary, exercising, nutritional  consult increased physical activity as tolerated   -If symptoms continues, will consider TPI or RFA  -Walking 20-30 minutes daily as tolerated   -Increase Cymbalta to 60 mg to help with pain and moods     Current Outpatient Medications   Medication Sig Dispense Refill    pregabalin (LYRICA) 75 MG capsule TAKE ONE CAPSULE BY MOUTH EVERY MORNING AND TAKE TWO CAPSULES BY MOUTH EVERY EVENING 90 capsule 1    celecoxib (CELEBREX) 200 MG capsule Take 1 capsule by mouth daily 60 capsule 1    nortriptyline (PAMELOR) 25 MG capsule Take 1-2 capsules by mouth nightly 60 capsule 1    HYDROcodone-acetaminophen (NORCO) 7.5-325 MG per tablet Take 1 tablet by mouth 3 times daily for 28 days. 84 tablet 0    DULoxetine (CYMBALTA) 30 MG extended release capsule Take 1 capsule by mouth daily 30 capsule 0    estradiol (ESTRACE) 0.1 MG/GM vaginal cream Place 0.5 g vaginally      fluticasone (FLONASE) 50 MCG/ACT nasal spray 2 sprays by Nasal route daily       No current facility-administered medications for this visit. I will continue her current medication regimen  which is part of the above treatment schedule. It has been helping with Ms. Sánchez's chronic  medical problems which for this visit include:   Diagnoses of Chronic pain syndrome, Primary osteoarthritis of right hip, Myofascial pain, Fibromyalgia, Facet syndrome, lumbar, Failed back surgical syndrome, Lumbar radiculopathy, DDD (degenerative disc disease), lumbar, and Generalized abdominal pain were pertinent to this visit. Risks and benefits of the medications and other alternative treatments  including no treatment were discussed with the patient. The common side effects of these medications were also explained to the patient. Informed verbal consent was obtained. Goals of current treatment regimen include improvement in pain, restoration of functioning- with focus on improvement in physical performance, general activity, work or disability,emotional distress, health care utilization and  decreased medication consumption. Will continue to monitor progress towards achieving/maintaining therapeutic goals with special emphasis on  1. Improvement in perceived interfernce  of pain with ADL's. Ability to do home exercises independently. Ability to do household chores indoor and/or outdoor work and social and leisure activities. Improve psychosocial and physical functioning. - she is showing progression towards this treatment goal with the current regimen. She was advised against drinking alcohol with the narcotic pain medicines, advised against driving or handling machinery while adjusting the dose of medicines or if having cognitive  issues related to the current medications. Risk of overdose and death, if medicines not taken as prescribed, were also discussed. If the patient develops new symptoms or if the symptoms worsen, the patient should call the office. While transcribing every attempt was made to maintain the accuracy of the note in terms of it's contents,there may have been some errors made inadvertently. Thank you for allowing me to participate in the care of this patient.     Rashawn Sepulveda MD.    Cc: Cait Scott

## 2022-02-15 RX ORDER — DULOXETIN HYDROCHLORIDE 30 MG/1
CAPSULE, DELAYED RELEASE ORAL
Qty: 30 CAPSULE | Refills: 0 | OUTPATIENT
Start: 2022-02-15

## 2022-02-15 NOTE — TELEPHONE ENCOUNTER
Medication:   Requested Prescriptions     Pending Prescriptions Disp Refills    DULoxetine (CYMBALTA) 30 MG extended release capsule [Pharmacy Med Name: DULoxetine HCL DR 30 MG CAPSULE] 30 capsule 0     Sig: TAKE ONE CAPSULE BY MOUTH DAILY     Last Filled:      Last appt: 12/23/2021   Next appt: Visit date not found    Last Lipid: No results found for: CHOL, TRIG, HDL, LDLCALC

## 2022-02-17 ENCOUNTER — OFFICE VISIT (OUTPATIENT)
Dept: PAIN MANAGEMENT | Age: 52
End: 2022-02-17
Payer: COMMERCIAL

## 2022-02-17 VITALS
BODY MASS INDEX: 36.21 KG/M2 | SYSTOLIC BLOOD PRESSURE: 140 MMHG | HEART RATE: 113 BPM | WEIGHT: 231.2 LBS | DIASTOLIC BLOOD PRESSURE: 89 MMHG | OXYGEN SATURATION: 96 %

## 2022-02-17 DIAGNOSIS — G89.4 CHRONIC PAIN SYNDROME: ICD-10-CM

## 2022-02-17 DIAGNOSIS — M16.11 PRIMARY OSTEOARTHRITIS OF RIGHT HIP: ICD-10-CM

## 2022-02-17 DIAGNOSIS — R10.84 GENERALIZED ABDOMINAL PAIN: ICD-10-CM

## 2022-02-17 DIAGNOSIS — M79.7 FIBROMYALGIA: ICD-10-CM

## 2022-02-17 DIAGNOSIS — M54.16 LUMBAR RADICULOPATHY: ICD-10-CM

## 2022-02-17 DIAGNOSIS — M79.18 MYOFASCIAL PAIN: ICD-10-CM

## 2022-02-17 DIAGNOSIS — M51.36 DDD (DEGENERATIVE DISC DISEASE), LUMBAR: ICD-10-CM

## 2022-02-17 DIAGNOSIS — M96.1 FAILED BACK SURGICAL SYNDROME: ICD-10-CM

## 2022-02-17 DIAGNOSIS — M47.816 FACET SYNDROME, LUMBAR: ICD-10-CM

## 2022-02-17 PROCEDURE — 99213 OFFICE O/P EST LOW 20 MIN: CPT | Performed by: INTERNAL MEDICINE

## 2022-02-17 RX ORDER — HYDROCODONE BITARTRATE AND ACETAMINOPHEN 7.5; 325 MG/1; MG/1
1 TABLET ORAL 3 TIMES DAILY
Qty: 84 TABLET | Refills: 0 | Status: SHIPPED | OUTPATIENT
Start: 2022-02-17 | End: 2022-03-17 | Stop reason: SDUPTHER

## 2022-02-17 RX ORDER — CELECOXIB 200 MG/1
200 CAPSULE ORAL DAILY
Qty: 60 CAPSULE | Refills: 1 | Status: SHIPPED | OUTPATIENT
Start: 2022-02-17 | End: 2022-04-11 | Stop reason: SDUPTHER

## 2022-02-17 NOTE — PROGRESS NOTES
Medication Sig Dispense Refill    HYDROcodone-acetaminophen (NORCO) 7.5-325 MG per tablet Take 1 tablet by mouth 3 times daily for 28 days. 84 tablet 0    DULoxetine (CYMBALTA) 60 MG extended release capsule Take 1 capsule by mouth daily 30 capsule 1    pregabalin (LYRICA) 75 MG capsule TAKE ONE CAPSULE BY MOUTH EVERY MORNING AND TAKE TWO CAPSULES BY MOUTH EVERY EVENING 90 capsule 1    celecoxib (CELEBREX) 200 MG capsule Take 1 capsule by mouth daily 60 capsule 1    nortriptyline (PAMELOR) 25 MG capsule Take 1-2 capsules by mouth nightly 60 capsule 1    estradiol (ESTRACE) 0.1 MG/GM vaginal cream Place 0.5 g vaginally      fluticasone (FLONASE) 50 MCG/ACT nasal spray 2 sprays by Nasal route daily       No facility-administered medications prior to visit. REVIEW OF SYSTEMS:    Respiratory: Negative for apnea, chest tightness and shortness of breath or change in baseline breathing. PHYSICAL EXAM:   Nursing note and vitals reviewed. BP (!) 140/89   Pulse 113   Wt 231 lb 3.2 oz (104.9 kg)   SpO2 96%   BMI 36.21 kg/m²   Constitutional: She appears well-developed and well-nourished. No acute distress. Cardiovascular: Normal rate, regular rhythm, normal heart sounds, and does not have murmur. Pulmonary/Chest: Effort normal. No respiratory distress. She does not have wheezes in the lung fields. She has no rales. Neurological/Psychiatric:She is alert and oriented to person, place, and time. Coordination is  normal.  Her mood isAppropriate and affect is Neutral/Euthymic(normal) . IMPRESSION:   1. Chronic pain syndrome    2. Myofascial pain    3. Facet syndrome, lumbar    4. Lumbar radiculopathy    5. Primary osteoarthritis of right hip    6. Fibromyalgia    7. Failed back surgical syndrome    8. DDD (degenerative disc disease), lumbar    9.  Generalized abdominal pain        PLAN:  Informed verbal consent was obtained  -She was advised weight reduction, diet changes- 800-1200 maricarmen diet, diet diary, exercising, nutritional  consult increased physical activity as tolerated   -Interm history reviewed    -Old MRI lumbar from 2020 reviewed   -Continue with Norco 3 per day  -Will get new MRI lumbar spine with and without contrast for evaluation   -Continue with all other adjuvant medications as before       Current Outpatient Medications   Medication Sig Dispense Refill    HYDROcodone-acetaminophen (NORCO) 7.5-325 MG per tablet Take 1 tablet by mouth 3 times daily for 28 days. 84 tablet 0    DULoxetine (CYMBALTA) 60 MG extended release capsule Take 1 capsule by mouth daily 30 capsule 1    pregabalin (LYRICA) 75 MG capsule TAKE ONE CAPSULE BY MOUTH EVERY MORNING AND TAKE TWO CAPSULES BY MOUTH EVERY EVENING 90 capsule 1    celecoxib (CELEBREX) 200 MG capsule Take 1 capsule by mouth daily 60 capsule 1    nortriptyline (PAMELOR) 25 MG capsule Take 1-2 capsules by mouth nightly 60 capsule 1    estradiol (ESTRACE) 0.1 MG/GM vaginal cream Place 0.5 g vaginally      fluticasone (FLONASE) 50 MCG/ACT nasal spray 2 sprays by Nasal route daily       No current facility-administered medications for this visit. I will continue her current medication regimen  which is part of the above treatment schedule. It has been helping with Ms. Sánchez's chronic  medical problems which for this visit include:   Diagnoses of Chronic pain syndrome, Myofascial pain, Facet syndrome, lumbar, Lumbar radiculopathy, Primary osteoarthritis of right hip, Fibromyalgia, Failed back surgical syndrome, and DDD (degenerative disc disease), lumbar were pertinent to this visit. Risks and benefits of the medications and other alternative treatments  including no treatment were discussed with the patient. The common side effects of these medications were also explained to the patient. Informed verbal consent was obtained.    Goals of current treatment regimen include improvement in pain, restoration of functioning- with focus on improvement in physical performance, general activity, work or disability,emotional distress, health care utilization and  decreased opioid medication consumption- titrating to the lowest effective dose. Will continue to monitor progress towards achieving/maintaining therapeutic goals with special emphasis on  1. Improvement in perceived interfernce  of pain with ADL's. Ability to do home exercises independently. Ability to do household chores indoor and/or outdoor work and social and leisure activities. Improve psychosocial and physical functioning. - she is showing progression towards this treatment goal with the current regimen. She was advised against drinking alcohol with the narcotic pain medicines, advised against driving or handling machinery while adjusting the dose of medicines or if having cognitive  issues related to the current medications. Risk of overdose and death, if medicines not taken as prescribed, were also discussed. If the patient develops new symptoms or if the symptoms worsen, the patient should call the office. While transcribing every attempt was made to maintain the accuracy of the note in terms of it's contents,there may have been some errors made inadvertently. Thank you for allowing me to participate in the care of this patient.     Oscar Ponce MD.    Cc: Marcin Jimenez

## 2022-02-22 ENCOUNTER — TELEPHONE (OUTPATIENT)
Dept: PAIN MANAGEMENT | Age: 52
End: 2022-02-22

## 2022-02-22 ENCOUNTER — TELEPHONE (OUTPATIENT)
Dept: ADMINISTRATIVE | Age: 52
End: 2022-02-22

## 2022-02-22 NOTE — TELEPHONE ENCOUNTER
The MRI ordered for this patient is being denied by insurance for lack of documented PT. A peer to peer is offered to overturn this denial by calling [employer] @ [phone] and referencing case # [id number].     This peer to peer would need to be completed by 2pm on Wednesday 02/23/2022 or the denial will be considered final.    Please let me know if you've any questions - thank you    Jose Platt RN  [address] [facility]  Phone: [phone]

## 2022-02-22 NOTE — TELEPHONE ENCOUNTER
Kylie Garcia RN         2/22/22 11:42 AM  Note  The MRI ordered for this patient is being denied by insurance for lack of documented PT.  A peer to peer is offered to overturn this denial by calling [employer] @ [phone] and referencing case # [id number].     This peer to peer would need to be completed by 2pm on Wednesday 02/23/2022 or the denial will be considered final.     Please let me know if you've any questions - thank you     Kylie Garcia RN  [address] [facility]  Phone: [phone]

## 2022-02-23 ENCOUNTER — TELEPHONE (OUTPATIENT)
Dept: PAIN MANAGEMENT | Age: 52
End: 2022-02-23

## 2022-02-23 NOTE — TELEPHONE ENCOUNTER
Dr. Patrick Brar did the peer to peer review. MRI approval # is [id number] at [facility] AT [facility]. Approved through 3/2/22. Location in [facility] phone # is [phone]. I called patient, josh.

## 2022-02-23 NOTE — TELEPHONE ENCOUNTER
Patient stated she called [facility] in [facility] DR ESA LICONA to get scheduled for her MRI but they are needing another order to be sent over before she can be scheduled. Their fax number is [phone]. Patient is requesting a callback once completed.

## 2022-02-23 NOTE — TELEPHONE ENCOUNTER
Called again stating they need a response back by 2pm today 02/23/2022 or they will have to deny the MRI order. Please advise.

## 2022-02-24 NOTE — TELEPHONE ENCOUNTER
This was forwarded to me. I only do Medication PA's. If this requires a response please respond to the pool ( [id number] [facility]). Thank you please advise patient.

## 2022-03-17 ENCOUNTER — OFFICE VISIT (OUTPATIENT)
Dept: PAIN MANAGEMENT | Age: 52
End: 2022-03-17
Payer: COMMERCIAL

## 2022-03-17 VITALS
WEIGHT: 232 LBS | HEIGHT: 67 IN | RESPIRATION RATE: 14 BRPM | TEMPERATURE: 98 F | BODY MASS INDEX: 36.41 KG/M2 | OXYGEN SATURATION: 97 % | SYSTOLIC BLOOD PRESSURE: 117 MMHG | HEART RATE: 86 BPM | DIASTOLIC BLOOD PRESSURE: 73 MMHG

## 2022-03-17 DIAGNOSIS — M47.816 FACET SYNDROME, LUMBAR: ICD-10-CM

## 2022-03-17 DIAGNOSIS — R10.84 GENERALIZED ABDOMINAL PAIN: ICD-10-CM

## 2022-03-17 DIAGNOSIS — F39 MOOD DISORDER (HCC): ICD-10-CM

## 2022-03-17 DIAGNOSIS — M96.1 FAILED BACK SURGICAL SYNDROME: ICD-10-CM

## 2022-03-17 DIAGNOSIS — M79.7 FIBROMYALGIA: ICD-10-CM

## 2022-03-17 DIAGNOSIS — M51.36 DDD (DEGENERATIVE DISC DISEASE), LUMBAR: ICD-10-CM

## 2022-03-17 DIAGNOSIS — M16.11 PRIMARY OSTEOARTHRITIS OF RIGHT HIP: ICD-10-CM

## 2022-03-17 DIAGNOSIS — F51.01 PRIMARY INSOMNIA: ICD-10-CM

## 2022-03-17 DIAGNOSIS — M79.18 MYOFASCIAL PAIN: ICD-10-CM

## 2022-03-17 DIAGNOSIS — G89.4 CHRONIC PAIN SYNDROME: ICD-10-CM

## 2022-03-17 DIAGNOSIS — M54.16 LUMBAR RADICULOPATHY: ICD-10-CM

## 2022-03-17 PROCEDURE — 99214 OFFICE O/P EST MOD 30 MIN: CPT | Performed by: INTERNAL MEDICINE

## 2022-03-17 RX ORDER — PREGABALIN 100 MG/1
100 CAPSULE ORAL 3 TIMES DAILY
Qty: 90 CAPSULE | Refills: 0 | Status: SHIPPED | OUTPATIENT
Start: 2022-03-17 | End: 2022-04-11 | Stop reason: SDUPTHER

## 2022-03-17 RX ORDER — HYDROCODONE BITARTRATE AND ACETAMINOPHEN 7.5; 325 MG/1; MG/1
1 TABLET ORAL 3 TIMES DAILY
Qty: 84 TABLET | Refills: 0 | Status: SHIPPED | OUTPATIENT
Start: 2022-03-17 | End: 2022-04-11 | Stop reason: SDUPTHER

## 2022-03-17 NOTE — PROGRESS NOTES
Suleman Sims  1970  [id number]    HISTORY OF PRESENT ILLNESS:  Ms. Valiant Blizzard is a 46 y.o. female returns for a follow up visit for multiple medical problems. Her current presenting problems are   1. Chronic pain syndrome    2. Facet syndrome, lumbar    3. Primary osteoarthritis of right hip    4. Failed back surgical syndrome    5. Generalized abdominal pain    6. Lumbar radiculopathy    7. Fibromyalgia    8. DDD (degenerative disc disease), lumbar    9. Primary insomnia    10. [facility] ([facility]    . As per information/history obtained from the PADT(patient assessment and documentation tool) - She complains of pain in the lower back with radiation to the hips Bilateral, upper leg Bilateral, knees Bilateral, lower leg Bilateral, ankles Bilateral and feet Bilateral She rates the pain 5/10 and describes it as aching. Pain is made worse by: movement. Current treatment regimen has helped relieve about 30% of the pain. She denies side effects from the current pain regimen. Patient reports that since the last follow up visit the physical functioning is unchanged, family/social relationships are unchanged, mood is unchanged and sleep patterns are unchanged, and that the overall functioning is unchanged. Patient denies neurological bowel or bladder. Patient denies misusing/abusing her narcotic pain medications or using any illegal drugs. There are No indicators for possible drug abuse, addiction or diversion problems. Upon obtaining the medical history from Ms. Valiant Blizzard regarding today's office visit for her presenting problems, patient states she has been doing about the same. Ms. Valiant Blizzard is complaining of her right groin and bilateral legs aching. 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 She states standing and bending is painful and her legs starts aching. Patient states she is using Celebrex along with Norco 3 per day. Patient states her sleep is fair.  Has fairly normal sleep latency. Averages about 4-6 hours of sleep a night. Denies any signs of sleep apnea. Feels somewhat rested in the morning, she is on Pamelor. Patient's  subjective report of her mood is fair. she describes occasional symptoms of depression, occasional  irritability and some mood swings. Describes her mood as being neutral and reports some pleasure in her daily activities. Reports  fair  appetite, energy and concentration. Able to function well in different aspects of her daily activities. Denies suicidal or homicidal ideation. Denies any complaints of increased tension, does   Worry sometimes and occasional  irritability  she denies any c/o increased anxiety, No c/o panic attacks or symptoms of PTSD, she is on Cymbalta 60 mg. Patient reports her weight has been stable. ALLERGIES/PAST MED/FAM/SOC HISTORY: Ms. Syeda Kruger allergies, past medical, family and social history were reviewed in the chart. Ms. Syeda Kruger current medications are   Outpatient Medications Prior to Visit   Medication Sig Dispense Refill    HYDROcodone-acetaminophen (NORCO) 7.5-325 MG per tablet Take 1 tablet by mouth 3 times daily for 28 days. 84 tablet 0    celecoxib (CELEBREX) 200 MG capsule Take 1 capsule by mouth daily 60 capsule 1    DULoxetine (CYMBALTA) 60 MG extended release capsule Take 1 capsule by mouth daily 30 capsule 1    pregabalin (LYRICA) 75 MG capsule TAKE ONE CAPSULE BY MOUTH EVERY MORNING AND TAKE TWO CAPSULES BY MOUTH EVERY EVENING 90 capsule 1    nortriptyline (PAMELOR) 25 MG capsule Take 1-2 capsules by mouth nightly 60 capsule 1    estradiol (ESTRACE) 0.1 MG/GM vaginal cream Place 0.5 g vaginally      fluticasone (FLONASE) 50 MCG/ACT nasal spray 2 sprays by Nasal route daily       No facility-administered medications prior to visit. REVIEW OF SYSTEMS:     Respiratory: Negative for shortness of breath.     Cardiovascular: Negative for chest pain, palpitations  Gastrointestinal: Negative for blood in stool, abdominal distention, nausea, vomiting, abdominal pain, diarrhea,constipation. Neurological: Negative for speech difficulty, weakness and light-headedness, dizziness, tremors, sleepiness  Psychiatric/Behavioral: Negative for suicidal ideas, hallucinations, behavioral problems, self-injury, decreased concentration/cognition, agitation, confusion. PHYSICAL EXAM:   Nursing note and vitals reviewed. /73   Pulse 86   Temp 98 °F (36.7 °C)   Resp 14   Ht 5' 7\" (1.702 m)   Wt 232 lb (105.2 kg)   SpO2 97%   BMI 36.34 kg/m²   General Appearance: Patient is well nourished, well developed, well groomed and in no acute distress. Skin: Skin is warm and dry, good turgor . No rash or lesions noted. She is not diaphoretic. Pulmonary/Chest: Effort normal. No respiratory distress or use of accessory muscles. Auscultation revealing normal air entry. She does not have wheezes in the lung fields. She has no rales. Cardiovascular: Normal rate, regular rhythm, normal heart sounds, and does not have murmur. Exam reveals no gallop and no friction rub. Musculoskeletal / Extremities: Range of motion is normal. Gait is normal, assistive devices use: none. She exhibits edema: none, and no tenderness. Neurological/Psychiatric:She is alert and oriented to person, place, and time. Coordination is  normal.   Judgement and Insight is normal  Her mood is Appropriate and affect is Neutral/Euthymic(normal) . Her behavior is normal.   thought content normal.        IMPRESSION:     1. Chronic pain syndrome    2. Facet syndrome, lumbar    3. Primary osteoarthritis of right hip    4. Failed back surgical syndrome    5. Generalized abdominal pain    6. Lumbar radiculopathy    7. Fibromyalgia    8. DDD (degenerative disc disease), lumbar    9. Primary insomnia    10. [facility] disorder ([facility]        PLAN:  Informed verbal consent was obtained.   -ROM/Stretching exercises as advised   -MRI lumbar spine reviewed; shows DDD multiple fluticasone (FLONASE) 50 MCG/ACT nasal spray 2 sprays by Nasal route daily       No current facility-administered medications for this visit. Goals of current treatment regimen include improvement in pain, restoration of functioning- with focus on improvement in physical performance, general activity, work or disability,emotional distress, health care utilization and  decreased medication consumption. Will continue to monitor progress towards achieving/maintaining therapeutic goals with special emphasis on  1. Improvement in perceived interfernce  of pain with ADL's. Ability to do home exercises independently. Ability to do household chores indoor and/or outdoor work and social and leisure activities. To increase flexibility/ROM, strength and endurance. Improve psychosocial and physical functioning.- she is not showing any significant progress/or showing regression  towards this goal and reassessment and adjustment of goals/treatment have been made. 2. Improving sleep to 6-7 hours a night. Improve mood/ anxiety and depression symptoms such as crying spells, low energy, problems with concentration, motivation. - she is showing progression towards this treatment goal with the current regimen. 3. Reduction of reliance on opioid analgesia/more appropriate opioid use. - she is showing progression towards this treatment goal with the current regimen. 4. Ability to focus/concentrate at work and perform the duties required of her at work  Sit through a workday without lower extremity symptoms. Stand 30-60 minutes without lower extremity symptoms. Ability to lift up to 10-20 lbs. Ability to go up and down stairs. Sit 30-60 minutes  Without having to stand up frequently. - she is maintaining/progressing towards her work related goals with the current regimen. Risks and benefits of the medications and other alternative treatments have been/were  discussed with the patient.  Any questions on the  common side effects of these medications were also answered. She was advised against drinking alcohol with the narcotic pain medicines, advised against driving or handling machinery when  starting or adjusting the dose of medicines, feeling groggy or drowsy, or if having any cognitive issues related to the current medications. Sheis fully aware of the risk of overdose and death, if medicines are misused and not taken as prescribed. If she develops new symptoms or if the symptoms worsen, she was told to call the office. .  Thank you for allowing me to participate in the care of this patient.     Benjamin Murray MD.    Cc: Dr.Dori Zara Jules

## 2022-04-11 ENCOUNTER — OFFICE VISIT (OUTPATIENT)
Dept: PAIN MANAGEMENT | Age: 52
End: 2022-04-11
Payer: COMMERCIAL

## 2022-04-11 VITALS
DIASTOLIC BLOOD PRESSURE: 80 MMHG | HEART RATE: 96 BPM | BODY MASS INDEX: 36.41 KG/M2 | WEIGHT: 232 LBS | OXYGEN SATURATION: 97 % | HEIGHT: 67 IN | SYSTOLIC BLOOD PRESSURE: 120 MMHG

## 2022-04-11 DIAGNOSIS — M47.816 FACET SYNDROME, LUMBAR: ICD-10-CM

## 2022-04-11 DIAGNOSIS — M54.16 LUMBAR RADICULOPATHY: ICD-10-CM

## 2022-04-11 DIAGNOSIS — M96.1 FAILED BACK SURGICAL SYNDROME: ICD-10-CM

## 2022-04-11 DIAGNOSIS — M51.36 DDD (DEGENERATIVE DISC DISEASE), LUMBAR: ICD-10-CM

## 2022-04-11 DIAGNOSIS — F39 MOOD DISORDER (HCC): ICD-10-CM

## 2022-04-11 DIAGNOSIS — M70.61 TROCHANTERIC BURSITIS OF BOTH HIPS: ICD-10-CM

## 2022-04-11 DIAGNOSIS — F51.01 PRIMARY INSOMNIA: ICD-10-CM

## 2022-04-11 DIAGNOSIS — G89.4 CHRONIC PAIN SYNDROME: ICD-10-CM

## 2022-04-11 DIAGNOSIS — R10.84 GENERALIZED ABDOMINAL PAIN: ICD-10-CM

## 2022-04-11 DIAGNOSIS — M79.7 FIBROMYALGIA: ICD-10-CM

## 2022-04-11 DIAGNOSIS — M70.62 TROCHANTERIC BURSITIS OF BOTH HIPS: ICD-10-CM

## 2022-04-11 DIAGNOSIS — M79.18 MYOFASCIAL PAIN: ICD-10-CM

## 2022-04-11 DIAGNOSIS — M16.11 PRIMARY OSTEOARTHRITIS OF RIGHT HIP: ICD-10-CM

## 2022-04-11 PROCEDURE — 99214 OFFICE O/P EST MOD 30 MIN: CPT | Performed by: INTERNAL MEDICINE

## 2022-04-11 PROCEDURE — 20610 DRAIN/INJ JOINT/BURSA W/O US: CPT | Performed by: INTERNAL MEDICINE

## 2022-04-11 RX ORDER — TRIAMCINOLONE ACETONIDE 40 MG/ML
40 INJECTION, SUSPENSION INTRA-ARTICULAR; INTRAMUSCULAR ONCE
Status: COMPLETED | OUTPATIENT
Start: 2022-04-11 | End: 2022-04-11

## 2022-04-11 RX ORDER — PREGABALIN 100 MG/1
100 CAPSULE ORAL 3 TIMES DAILY
Qty: 90 CAPSULE | Refills: 0 | Status: SHIPPED | OUTPATIENT
Start: 2022-04-11 | End: 2022-05-09 | Stop reason: SDUPTHER

## 2022-04-11 RX ORDER — HYDROCODONE BITARTRATE AND ACETAMINOPHEN 7.5; 325 MG/1; MG/1
1 TABLET ORAL 3 TIMES DAILY
Qty: 84 TABLET | Refills: 0 | Status: SHIPPED | OUTPATIENT
Start: 2022-04-11 | End: 2022-05-09 | Stop reason: SDUPTHER

## 2022-04-11 RX ORDER — NORTRIPTYLINE HYDROCHLORIDE 25 MG/1
25-50 CAPSULE ORAL NIGHTLY
Qty: 60 CAPSULE | Refills: 1 | Status: SHIPPED | OUTPATIENT
Start: 2022-04-11 | End: 2022-05-09 | Stop reason: SDUPTHER

## 2022-04-11 RX ORDER — CELECOXIB 200 MG/1
200 CAPSULE ORAL DAILY
Qty: 60 CAPSULE | Refills: 1 | Status: SHIPPED | OUTPATIENT
Start: 2022-04-11 | End: 2022-05-09 | Stop reason: SDUPTHER

## 2022-04-11 RX ORDER — DULOXETIN HYDROCHLORIDE 60 MG/1
60 CAPSULE, DELAYED RELEASE ORAL DAILY
Qty: 30 CAPSULE | Refills: 1 | Status: SHIPPED | OUTPATIENT
Start: 2022-04-11 | End: 2022-05-09 | Stop reason: SDUPTHER

## 2022-04-11 RX ADMIN — TRIAMCINOLONE ACETONIDE 40 MG: 40 INJECTION, SUSPENSION INTRA-ARTICULAR; INTRAMUSCULAR at 10:40

## 2022-04-11 NOTE — PROGRESS NOTES
Evelyn Sprain  1970  [id number]    HISTORY OF PRESENT ILLNESS:  Ms. Bebeto Che is a 46 y.o. female returns for a follow up visit for multiple medical problems. Her current presenting problems are   1. Chronic pain syndrome    2. Facet syndrome, lumbar    3. Primary osteoarthritis of right hip    4. Failed back surgical syndrome    5. Lumbar radiculopathy    6. DDD (degenerative disc disease), lumbar    7. Generalized abdominal pain    8. Fibromyalgia    9. Myofascial pain    . As per information/history obtained from the PADT(patient assessment and documentation tool) - She complains of pain in the right upper arm, lower back, both hips and both knees She rates the pain 5/10 and describes it as aching. Pain is made worse by: movement. Current treatment regimen has helped relieve about 40% of the pain. She denies side effects from the current pain regimen. Patient reports that since the last follow up visit the physical functioning is unchanged, family/social relationships are unchanged, mood is unchanged and sleep patterns are unchanged, and that the overall functioning is unchanged. Patient denies neurological bowel or bladder. Patient denies misusing/abusing her narcotic pain medications or using any illegal drugs. There are No indicators for possible drug abuse, addiction or diversion problems. Upon obtaining the medical history from Ms. Bebeto Che regarding today's office visit for her presenting problems, patient complains she is having problems with her right arm and bicep. She states her hips and back still hurts. She reports she aches all the time. She complains of pain in the back and legs. She says she has been using Lyrica 300 mg. She mentions she is working full time. She  Reports she has been off the Cymbalta. She states she quit using it, but still on Pamelor. Patient states her sleep is fair. Has fairly normal sleep latency. Averages about 4-6 hours of sleep a night.  Denies any signs of sleep apnea. Feels somewhat rested in the morning. Patient's  subjective report of her mood is fair. she describes occasional symptoms of depression, occasional  irritability and some mood swings. Describes her mood as being neutral and reports some pleasure in her daily activities. Reports  fair  appetite, energy and concentration. Able to function well in different aspects of her daily activities. Denies suicidal or homicidal ideation. Denies any complaints of increased tension, does   Worry sometimes and occasional  irritability  she denies any c/o increased anxiety, No c/o panic attacks or symptoms of PTSD. ALLERGIES/PAST MED/FAM/SOC HISTORY: Ms. Gianluca Otero allergies, past medical, family and social history were reviewed in the chart. Ms. Gianluca Otero current medications are   Outpatient Medications Prior to Visit   Medication Sig Dispense Refill    HYDROcodone-acetaminophen (NORCO) 7.5-325 MG per tablet Take 1 tablet by mouth 3 times daily for 28 days. 84 tablet 0    pregabalin (LYRICA) 100 MG capsule Take 1 capsule by mouth 3 times daily for 30 days. 90 capsule 0    celecoxib (CELEBREX) 200 MG capsule Take 1 capsule by mouth daily 60 capsule 1    DULoxetine (CYMBALTA) 60 MG extended release capsule Take 1 capsule by mouth daily 30 capsule 1    nortriptyline (PAMELOR) 25 MG capsule Take 1-2 capsules by mouth nightly 60 capsule 1    estradiol (ESTRACE) 0.1 MG/GM vaginal cream Place 0.5 g vaginally      fluticasone (FLONASE) 50 MCG/ACT nasal spray 2 sprays by Nasal route daily       No facility-administered medications prior to visit. REVIEW OF SYSTEMS:     Respiratory: Negative for shortness of breath. Cardiovascular: Negative for chest pain, palpitations  Gastrointestinal: Negative for blood in stool, abdominal distention, nausea, vomiting, abdominal pain, diarrhea,constipation.   Neurological: Negative for speech difficulty, weakness and light-headedness, dizziness, tremors, sleepiness  Psychiatric/Behavioral: Negative for suicidal ideas, hallucinations, behavioral problems, self-injury, decreased concentration/cognition, agitation, confusion. PHYSICAL EXAM:   Nursing note and vitals reviewed. /80   Pulse 96   Ht 5' 7\" (1.702 m)   Wt 232 lb (105.2 kg)   SpO2 97%   BMI 36.34 kg/m²   General Appearance: Patient is well nourished, well developed, well groomed and in no acute distress. Skin: Skin is warm and dry, good turgor . No rash or lesions noted. She is not diaphoretic. Pulmonary/Chest: Effort normal. No respiratory distress or use of accessory muscles. Auscultation revealing normal air entry. She does not have wheezes in the lung fields. She has no rales. Cardiovascular: Normal rate, regular rhythm, normal heart sounds, and does not have murmur. Exam reveals no gallop and no friction rub. Musculoskeletal / Extremities: Range of motion is normal. Gait is normal, assistive devices use: none. She exhibits edema: none, and + Tenderness   Neurological/Psychiatric:She is alert and oriented to person, place, and time. Coordination is  normal.   Judgement and Insight is poor  Her mood is Appropriate and affect is Neutral/Euthymic(normal) . Her behavior is normal.   thought content . Other: + tenderness, Bilateral hips greater trochanter area    IMPRESSION:     1. Chronic pain syndrome    2. Facet syndrome, lumbar    3. Primary osteoarthritis of right hip    4. Failed back surgical syndrome    5. Lumbar radiculopathy    6. DDD (degenerative disc disease), lumbar    7. Generalized abdominal pain    8. Fibromyalgia    9. Myofascial pain    10. Primary insomnia    11. Mood disorder ([facility]    12. Trochanteric bursitis of both hips        PLAN:  Informed verbal consent was obtained. -[place] record was obtained and reviewed  for the last one year and no indicators of drug misuse  were found.  Any other controlled substance prescriptions  seen on the record have been accounted for, I am aware of the patient receiving these medications. Gustavo Duval [place] record will be rechecked  -Restart Cymbalta 30 mg increase to 60 mg   -Exercises given for elbow tendonitis   -Injections discuss with patient   -Will give a injection in the right hip   -Informed verbal consent was obtained from the patient. Risks and benefits of the procedure were explained. Complications of the procedure and side effects of kenalog/ lidocaine were discussed with patient. Using 0.25% marcaine and 1cc of kenalog=40mg the right Hip was injected under aseptic conditions. Patient tolerated procedure well. Advised ice and rest.   -She was advised to increase fluids ( 5-7  glasses of fluid daily), limit caffeine, avoid cheese products, increase dietary fiber, increase activity and exercise as tolerated and relax regularly and enjoy meals   -Continue with Norco 3 per day   she was advised proper sleep hygiene-told to avoid:use of caffeine or other stimulants after noon, alcohol use near bedtime, long or frequent naps during the day, erratic sleep schedule, heavy meals near bedtime, vigorous exercise near bedtime and use of electronic devices near bedtime   -Continue with Norco 3 per day   -she was advised proper sleep hygiene-told to avoid:use of caffeine or other stimulants after noon, alcohol use near bedtime, long or frequent naps during the day, erratic sleep schedule, heavy meals near bedtime, vigorous exercise near bedtime and use of electronic devices near bedtime   -Continue with Pamidana    Ms. Alanis Schumacher will be prescribed  the medications  listed below which are for treatment of her presenting  medical problems which for this visit include:   Diagnoses of Chronic pain syndrome, Facet syndrome, lumbar, Primary osteoarthritis of right hip, Failed back surgical syndrome, Lumbar radiculopathy, DDD (degenerative disc disease), lumbar, Generalized abdominal pain, Fibromyalgia, Myofascial pain, Primary insomnia, Mood disorder treatment goal with the current regimen. 3. Reduction of reliance on opioid analgesia/more appropriate opioid use. - she is showing progression towards this treatment goal with the current regimen. 4. Ability to focus/concentrate at work and perform the duties required of her at work  Sit through a workday without lower extremity symptoms. Stand 30-60 minutes without lower extremity symptoms. Ability to lift up to 10-20 lbs. Ability to go up and down stairs. Sit 30-60 minutes  Without having to stand up frequently. - she is maintaining/progressing towards her work related goals with the current regimen. Risks and benefits of the medications and other alternative treatments have been/were  discussed with the patient. Any questions on the  common side effects of these medications were also answered. She was advised against drinking alcohol with the narcotic pain medicines, advised against driving or handling machinery when  starting or adjusting the dose of medicines, feeling groggy or drowsy, or if having any cognitive issues related to the current medications. Sheis fully aware of the risk of overdose and death, if medicines are misused and not taken as prescribed. If she develops new symptoms or if the symptoms worsen, she was told to call the office. .  Thank you for allowing me to participate in the care of this patient.     Teena Shelton MD.    Cc: Dr.Dori Stacy Isaac

## 2022-04-22 RX ORDER — DULOXETIN HYDROCHLORIDE 60 MG/1
CAPSULE, DELAYED RELEASE ORAL
Qty: 30 CAPSULE | Refills: 1 | OUTPATIENT
Start: 2022-04-22

## 2022-04-22 RX ORDER — PREGABALIN 75 MG/1
CAPSULE ORAL
Qty: 90 CAPSULE | OUTPATIENT
Start: 2022-04-22

## 2022-05-09 ENCOUNTER — OFFICE VISIT (OUTPATIENT)
Dept: PAIN MANAGEMENT | Age: 52
End: 2022-05-09
Payer: COMMERCIAL

## 2022-05-09 VITALS
SYSTOLIC BLOOD PRESSURE: 128 MMHG | DIASTOLIC BLOOD PRESSURE: 83 MMHG | OXYGEN SATURATION: 99 % | HEIGHT: 67 IN | HEART RATE: 76 BPM | BODY MASS INDEX: 36.41 KG/M2 | WEIGHT: 232 LBS

## 2022-05-09 DIAGNOSIS — M51.36 DDD (DEGENERATIVE DISC DISEASE), LUMBAR: ICD-10-CM

## 2022-05-09 DIAGNOSIS — G89.4 CHRONIC PAIN SYNDROME: ICD-10-CM

## 2022-05-09 DIAGNOSIS — M54.16 LUMBAR RADICULOPATHY: ICD-10-CM

## 2022-05-09 DIAGNOSIS — M79.18 MYOFASCIAL PAIN: ICD-10-CM

## 2022-05-09 DIAGNOSIS — M70.61 TROCHANTERIC BURSITIS OF BOTH HIPS: ICD-10-CM

## 2022-05-09 DIAGNOSIS — R10.84 GENERALIZED ABDOMINAL PAIN: ICD-10-CM

## 2022-05-09 DIAGNOSIS — M70.62 TROCHANTERIC BURSITIS OF BOTH HIPS: ICD-10-CM

## 2022-05-09 DIAGNOSIS — M96.1 FAILED BACK SURGICAL SYNDROME: ICD-10-CM

## 2022-05-09 DIAGNOSIS — M16.11 PRIMARY OSTEOARTHRITIS OF RIGHT HIP: ICD-10-CM

## 2022-05-09 DIAGNOSIS — M79.7 FIBROMYALGIA: ICD-10-CM

## 2022-05-09 DIAGNOSIS — M47.816 FACET SYNDROME, LUMBAR: ICD-10-CM

## 2022-05-09 PROCEDURE — 99213 OFFICE O/P EST LOW 20 MIN: CPT | Performed by: INTERNAL MEDICINE

## 2022-05-09 RX ORDER — PREGABALIN 100 MG/1
100 CAPSULE ORAL 3 TIMES DAILY
Qty: 90 CAPSULE | Refills: 0 | Status: SHIPPED | OUTPATIENT
Start: 2022-05-09 | End: 2022-06-06 | Stop reason: SDUPTHER

## 2022-05-09 RX ORDER — HYDROCODONE BITARTRATE AND ACETAMINOPHEN 7.5; 325 MG/1; MG/1
1 TABLET ORAL 3 TIMES DAILY
Qty: 84 TABLET | Refills: 0 | Status: SHIPPED | OUTPATIENT
Start: 2022-05-09 | End: 2022-06-06 | Stop reason: SDUPTHER

## 2022-05-09 RX ORDER — NORTRIPTYLINE HYDROCHLORIDE 25 MG/1
25-50 CAPSULE ORAL NIGHTLY
Qty: 60 CAPSULE | Refills: 1 | Status: SHIPPED | OUTPATIENT
Start: 2022-05-09 | End: 2022-07-11 | Stop reason: SDUPTHER

## 2022-05-09 RX ORDER — CELECOXIB 200 MG/1
200 CAPSULE ORAL DAILY
Qty: 60 CAPSULE | Refills: 1 | Status: SHIPPED | OUTPATIENT
Start: 2022-05-09 | End: 2022-06-06 | Stop reason: SDUPTHER

## 2022-05-09 RX ORDER — DULOXETIN HYDROCHLORIDE 60 MG/1
60 CAPSULE, DELAYED RELEASE ORAL DAILY
Qty: 30 CAPSULE | Refills: 1 | Status: SHIPPED | OUTPATIENT
Start: 2022-05-09 | End: 2022-06-06 | Stop reason: SDUPTHER

## 2022-05-09 NOTE — PROGRESS NOTES
Timo Fuller  1970  [id number]      HISTORY OF PRESENT ILLNESS:  Ms. Aixa Reina is a 46 y.o. female returns for a follow up visit for pain management  She has a diagnosis of   1. Chronic pain syndrome    2. Failed back surgical syndrome    3. Generalized abdominal pain    4. Facet syndrome, lumbar    5. Lumbar radiculopathy    6. Fibromyalgia    7. Primary osteoarthritis of right hip    8. DDD (degenerative disc disease), lumbar    9. Myofascial pain    10. Mood disorder ([address]    11. Primary insomnia    12. Trochanteric bursitis of both hips    . She complains of pain in the right elbow, lower back with radiation to the bilateral hips  She rates the pain 5/10 and describes it as aching, burning. Current treatment regimen has helped relieve about 30% of the pain. She denies any side effects from the current pain regimen. Patient reports that since the last follow up visit the physical functioning is unchanged, family/social relationships are unchanged, mood is unchanged sleep patterns are unchanged, and that the overall functioning is unchanged. Patient denies misusing/abusing her narcotic pain medications or using any illegal drugs. There are No indicators for possible drug abuse, addiction or diversion problems. Patient states she is doing about the same, complains her legs, back and hip hurts. She says she was helping her daughter with her move and is feeling sore. She mentions her right arm hurts. She states she is doing exercises at home. She reports she is working full time still, and weight has been stable. ALLERGIES: Patients list of allergies were reviewed     MEDICATIONS: Ms. Aixa Reina list of medications were reviewed. Her current medications are   Outpatient Medications Prior to Visit   Medication Sig Dispense Refill    HYDROcodone-acetaminophen (NORCO) 7.5-325 MG per tablet Take 1 tablet by mouth 3 times daily for 28 days.  84 tablet 0    pregabalin (LYRICA) 100 MG capsule Take 1 capsule by mouth 3 times daily for 30 days. 90 capsule 0    celecoxib (CELEBREX) 200 MG capsule Take 1 capsule by mouth daily 60 capsule 1    DULoxetine (CYMBALTA) 60 MG extended release capsule Take 1 capsule by mouth daily 30 capsule 1    nortriptyline (PAMELOR) 25 MG capsule Take 1-2 capsules by mouth nightly 60 capsule 1    estradiol (ESTRACE) 0.1 MG/GM vaginal cream Place 0.5 g vaginally      fluticasone (FLONASE) 50 MCG/ACT nasal spray 2 sprays by Nasal route daily       No facility-administered medications prior to visit. REVIEW OF SYSTEMS:    Respiratory: Negative for apnea, chest tightness and shortness of breath or change in baseline breathing. PHYSICAL EXAM:   Nursing note and vitals reviewed. /83   Pulse 76   Ht 5' 7\" (1.702 m)   Wt 232 lb (105.2 kg)   SpO2 99%   BMI 36.34 kg/m²   Constitutional: She appears well-developed and well-nourished. No acute distress. Cardiovascular: Normal rate, regular rhythm, normal heart sounds, and does not have murmur. Pulmonary/Chest: Effort normal. No respiratory distress. She does not have wheezes in the lung fields. She has no rales. Neurological/Psychiatric:She is alert and oriented to person, place, and time. Coordination is  normal.  Her mood isAppropriate and affect is Neutral/Euthymic(normal) . IMPRESSION:   1. Chronic pain syndrome    2. Failed back surgical syndrome    3. Generalized abdominal pain    4. Facet syndrome, lumbar    5. Lumbar radiculopathy    6. Fibromyalgia    7. Primary osteoarthritis of right hip    8. DDD (degenerative disc disease), lumbar    9. Myofascial pain    10.  Trochanteric bursitis of both hips        PLAN:  Informed verbal consent was obtained  -ROM/Stretching exercises as advised   -She was advised to increase fluids ( 5-7  glasses of fluid daily), limit caffeine, avoid cheese products, increase dietary fiber, increase activity and exercise as tolerated and relax regularly and enjoy meals   -Continue with Norco 3 per day   -Continue with all other adjuvants   -She was advised weight reduction, diet changes- 800-1200 maricarmen diet, diet diary, exercising, nutritional  consult increased physical activity as tolerated   -Status post DANICA's in the past, but not much help    Current Outpatient Medications   Medication Sig Dispense Refill    HYDROcodone-acetaminophen (NORCO) 7.5-325 MG per tablet Take 1 tablet by mouth 3 times daily for 28 days. 84 tablet 0    pregabalin (LYRICA) 100 MG capsule Take 1 capsule by mouth 3 times daily for 30 days. 90 capsule 0    celecoxib (CELEBREX) 200 MG capsule Take 1 capsule by mouth daily 60 capsule 1    DULoxetine (CYMBALTA) 60 MG extended release capsule Take 1 capsule by mouth daily 30 capsule 1    nortriptyline (PAMELOR) 25 MG capsule Take 1-2 capsules by mouth nightly 60 capsule 1    estradiol (ESTRACE) 0.1 MG/GM vaginal cream Place 0.5 g vaginally      fluticasone (FLONASE) 50 MCG/ACT nasal spray 2 sprays by Nasal route daily       No current facility-administered medications for this visit. I will continue her current medication regimen  which is part of the above treatment schedule. It has been helping with Ms. Sánchez's chronic  medical problems which for this visit include:   Diagnoses of Chronic pain syndrome, Failed back surgical syndrome, Generalized abdominal pain, Facet syndrome, lumbar, Lumbar radiculopathy, Fibromyalgia, Primary osteoarthritis of right hip, DDD (degenerative disc disease), lumbar, Myofascial pain, Mood disorder ([address] Primary insomnia, and Trochanteric bursitis of both hips were pertinent to this visit. Risks and benefits of the medications and other alternative treatments  including no treatment were discussed with the patient. The common side effects of these medications were also explained to the patient. Informed verbal consent was obtained.    Goals of current treatment regimen include improvement in pain, restoration of functioning- with focus on improvement in physical performance, general activity, work or disability,emotional distress, health care utilization and  decreased medication consumption. Will continue to monitor progress towards achieving/maintaining therapeutic goals with special emphasis on  1. Improvement in perceived interfernce  of pain with ADL's. Ability to do home exercises independently. Ability to do household chores indoor and/or outdoor work and social and leisure activities. Improve psychosocial and physical functioning. - she is showing progression towards this treatment goal with the current regimen. She was advised against drinking alcohol with the narcotic pain medicines, advised against driving or handling machinery while adjusting the dose of medicines or if having cognitive  issues related to the current medications. Risk of overdose and death, if medicines not taken as prescribed, were also discussed. If the patient develops new symptoms or if the symptoms worsen, the patient should call the office. While transcribing every attempt was made to maintain the accuracy of the note in terms of it's contents,there may have been some errors made inadvertently. Thank you for allowing me to participate in the care of this patient.     Silvana Philippe MD.    Cc: Dr.Dori Maryse Zuñiga

## 2022-06-06 ENCOUNTER — OFFICE VISIT (OUTPATIENT)
Dept: PAIN MANAGEMENT | Age: 52
End: 2022-06-06
Payer: COMMERCIAL

## 2022-06-06 VITALS
SYSTOLIC BLOOD PRESSURE: 153 MMHG | OXYGEN SATURATION: 98 % | BODY MASS INDEX: 36.4 KG/M2 | DIASTOLIC BLOOD PRESSURE: 96 MMHG | WEIGHT: 232.4 LBS | HEART RATE: 108 BPM

## 2022-06-06 DIAGNOSIS — M70.61 TROCHANTERIC BURSITIS OF BOTH HIPS: ICD-10-CM

## 2022-06-06 DIAGNOSIS — M51.36 DDD (DEGENERATIVE DISC DISEASE), LUMBAR: ICD-10-CM

## 2022-06-06 DIAGNOSIS — M96.1 FAILED BACK SURGICAL SYNDROME: ICD-10-CM

## 2022-06-06 DIAGNOSIS — M54.16 LUMBAR RADICULOPATHY: ICD-10-CM

## 2022-06-06 DIAGNOSIS — M79.18 MYOFASCIAL PAIN: ICD-10-CM

## 2022-06-06 DIAGNOSIS — M47.816 FACET SYNDROME, LUMBAR: ICD-10-CM

## 2022-06-06 DIAGNOSIS — G89.4 CHRONIC PAIN SYNDROME: ICD-10-CM

## 2022-06-06 DIAGNOSIS — M79.7 FIBROMYALGIA: ICD-10-CM

## 2022-06-06 DIAGNOSIS — R10.84 GENERALIZED ABDOMINAL PAIN: ICD-10-CM

## 2022-06-06 DIAGNOSIS — M70.62 TROCHANTERIC BURSITIS OF BOTH HIPS: ICD-10-CM

## 2022-06-06 PROCEDURE — 99213 OFFICE O/P EST LOW 20 MIN: CPT | Performed by: INTERNAL MEDICINE

## 2022-06-06 RX ORDER — HYDROCODONE BITARTRATE AND ACETAMINOPHEN 7.5; 325 MG/1; MG/1
1 TABLET ORAL 3 TIMES DAILY
Qty: 84 TABLET | Refills: 0 | Status: SHIPPED | OUTPATIENT
Start: 2022-06-06 | End: 2022-07-11 | Stop reason: SDUPTHER

## 2022-06-06 RX ORDER — PREGABALIN 100 MG/1
100 CAPSULE ORAL 3 TIMES DAILY
Qty: 90 CAPSULE | Refills: 0 | Status: SHIPPED | OUTPATIENT
Start: 2022-06-06 | End: 2022-07-08 | Stop reason: SDUPTHER

## 2022-06-06 RX ORDER — DULOXETIN HYDROCHLORIDE 60 MG/1
60 CAPSULE, DELAYED RELEASE ORAL DAILY
Qty: 30 CAPSULE | Refills: 1 | Status: SHIPPED | OUTPATIENT
Start: 2022-06-06 | End: 2022-07-11 | Stop reason: SDUPTHER

## 2022-06-06 RX ORDER — CELECOXIB 200 MG/1
200 CAPSULE ORAL DAILY
Qty: 60 CAPSULE | Refills: 1 | Status: SHIPPED | OUTPATIENT
Start: 2022-06-06 | End: 2022-07-11 | Stop reason: SDUPTHER

## 2022-06-07 NOTE — PROGRESS NOTES
Renu Saravia  1970  [id number]      HISTORY OF PRESENT ILLNESS:  Ms. Xiomara Aguilar is a 46 y.o. female returns for a follow up visit for pain management  She has a diagnosis of   1. Chronic pain syndrome    2. Generalized abdominal pain    3. Lumbar radiculopathy    4. Primary osteoarthritis of right hip    5. Myofascial pain    6. Primary insomnia    7. Failed back surgical syndrome    8. Facet syndrome, lumbar    9. Fibromyalgia    10. DDD (degenerative disc disease), lumbar    11. Trochanteric bursitis of both hips    12. Mood disorder ([address]    . She complains of pain in the lower back  She rates the pain 3/10 and describes it as aching. Current treatment regimen has helped relieve about 50% of the pain. She denies any side effects from the current pain regimen. Patient reports that since the last follow up visit the physical functioning is unchanged, family/social relationships are unchanged, mood is unchanged sleep patterns are unchanged, and that the overall functioning is unchanged. Patient denies misusing/abusing her narcotic pain medications or using any illegal drugs. There are No indicators for possible drug abuse, addiction or diversion problems, patient states she has had a better month. Ms. Xiomara Aguilar mentions she is using Norco 3 per day along with the other adjuvants. She reports she has been off work for a week for the summer. Patient denies any side effects with the medications. She mentions she is using all the adjuvants as before. Patient reports her weight has been stable. ALLERGIES: Patients list of allergies were reviewed     MEDICATIONS: Ms. Xiomara Aguilar list of medications were reviewed. Her current medications are   Outpatient Medications Prior to Visit   Medication Sig Dispense Refill    nortriptyline (PAMELOR) 25 MG capsule Take 1-2 capsules by mouth nightly 60 capsule 1    estradiol (ESTRACE) 0.1 MG/GM vaginal cream Place 0.5 g vaginally      fluticasone (FLONASE) 50 MCG/ACT nasal spray 2 sprays by Nasal route daily      HYDROcodone-acetaminophen (NORCO) 7.5-325 MG per tablet Take 1 tablet by mouth 3 times daily for 28 days. 84 tablet 0    pregabalin (LYRICA) 100 MG capsule Take 1 capsule by mouth 3 times daily for 30 days. 90 capsule 0    celecoxib (CELEBREX) 200 MG capsule Take 1 capsule by mouth daily 60 capsule 1    DULoxetine (CYMBALTA) 60 MG extended release capsule Take 1 capsule by mouth daily 30 capsule 1     No facility-administered medications prior to visit. REVIEW OF SYSTEMS:    Respiratory: Negative for apnea, chest tightness and shortness of breath or change in baseline breathing. PHYSICAL EXAM:   Nursing note and vitals reviewed. BP (!) 153/96   Pulse (!) 108   Wt 232 lb 6.4 oz (105.4 kg)   SpO2 98%   BMI 36.40 kg/m²   Constitutional: She appears well-developed and well-nourished. No acute distress. Cardiovascular: Normal rate, regular rhythm, normal heart sounds, and does not have murmur. Pulmonary/Chest: Effort normal. No respiratory distress. She does not have wheezes in the lung fields. She has no rales. Neurological/Psychiatric:She is alert and oriented to person, place, and time. Coordination is  normal.  Her mood isAppropriate and affect is Neutral/Euthymic(normal) . Her    IMPRESSION:   1. Chronic pain syndrome    2. Generalized abdominal pain    3. Lumbar radiculopathy    4. Myofascial pain    5. Failed back surgical syndrome    6. Facet syndrome, lumbar    7. Fibromyalgia    8. DDD (degenerative disc disease), lumbar    9.  Trochanteric bursitis of both hips        PLAN:  Informed verbal consent was obtained  -ROM/Stretching exercises as advised   -She was advised to increase fluids ( 5-7  glasses of fluid daily), limit caffeine, avoid cheese products, increase dietary fiber, increase activity and exercise as tolerated and relax regularly and enjoy meals   -She was advised weight reduction, diet changes- 800-1200 maricarmen diet, diet diary, exercising, nutritional  consult increased physical activity as tolerated   -Continue with all other adjuvants and Norco 3 per day     Current Outpatient Medications   Medication Sig Dispense Refill    HYDROcodone-acetaminophen (NORCO) 7.5-325 MG per tablet Take 1 tablet by mouth 3 times daily for 28 days. 84 tablet 0    pregabalin (LYRICA) 100 MG capsule Take 1 capsule by mouth 3 times daily for 30 days. 90 capsule 0    celecoxib (CELEBREX) 200 MG capsule Take 1 capsule by mouth daily 60 capsule 1    DULoxetine (CYMBALTA) 60 MG extended release capsule Take 1 capsule by mouth daily 30 capsule 1    nortriptyline (PAMELOR) 25 MG capsule Take 1-2 capsules by mouth nightly 60 capsule 1    estradiol (ESTRACE) 0.1 MG/GM vaginal cream Place 0.5 g vaginally      fluticasone (FLONASE) 50 MCG/ACT nasal spray 2 sprays by Nasal route daily       No current facility-administered medications for this visit. I will continue her current medication regimen  which is part of the above treatment schedule. It has been helping with Ms. Sánchez's chronic  medical problems which for this visit include:   Diagnoses of Chronic pain syndrome, Generalized abdominal pain, Lumbar radiculopathy, Primary osteoarthritis of right hip, Myofascial pain, Primary insomnia, Failed back surgical syndrome, Facet syndrome, lumbar, Fibromyalgia, DDD (degenerative disc disease), lumbar, Trochanteric bursitis of both hips, and Mood disorder ([facility] [address] were pertinent to this visit. Risks and benefits of the medications and other alternative treatments  including no treatment were discussed with the patient. The common side effects of these medications were also explained to the patient. Informed verbal consent was obtained.    Goals of current treatment regimen include improvement in pain, restoration of functioning- with focus on improvement in physical performance, general activity, work or disability,emotional distress, health care utilization and decreased medication consumption. Will continue to monitor progress towards achieving/maintaining therapeutic goals with special emphasis on  1. Improvement in perceived interfernce  of pain with ADL's. Ability to do home exercises independently. Ability to do household chores indoor and/or outdoor work and social and leisure activities. Improve psychosocial and physical functioning. - she is showing progression towards this treatment goal with the current regimen. She was advised against drinking alcohol with the narcotic pain medicines, advised against driving or handling machinery while adjusting the dose of medicines or if having cognitive  issues related to the current medications. Risk of overdose and death, if medicines not taken as prescribed, were also discussed. If the patient develops new symptoms or if the symptoms worsen, the patient should call the office. While transcribing every attempt was made to maintain the accuracy of the note in terms of it's contents,there may have been some errors made inadvertently. Thank you for allowing me to participate in the care of this patient.     Khurram Nielson MD.    Cc: Dr.Dori Ramses Schaeffer

## 2022-07-07 ENCOUNTER — TELEPHONE (OUTPATIENT)
Dept: PAIN MANAGEMENT | Age: 52
End: 2022-07-07

## 2022-07-07 DIAGNOSIS — R10.84 GENERALIZED ABDOMINAL PAIN: ICD-10-CM

## 2022-07-07 DIAGNOSIS — M79.7 FIBROMYALGIA: ICD-10-CM

## 2022-07-07 DIAGNOSIS — M70.62 TROCHANTERIC BURSITIS OF BOTH HIPS: ICD-10-CM

## 2022-07-07 DIAGNOSIS — M70.61 TROCHANTERIC BURSITIS OF BOTH HIPS: ICD-10-CM

## 2022-07-07 DIAGNOSIS — M47.816 FACET SYNDROME, LUMBAR: ICD-10-CM

## 2022-07-07 DIAGNOSIS — M54.16 LUMBAR RADICULOPATHY: ICD-10-CM

## 2022-07-07 DIAGNOSIS — M79.18 MYOFASCIAL PAIN: ICD-10-CM

## 2022-07-07 DIAGNOSIS — G89.4 CHRONIC PAIN SYNDROME: ICD-10-CM

## 2022-07-07 DIAGNOSIS — M96.1 FAILED BACK SURGICAL SYNDROME: ICD-10-CM

## 2022-07-07 DIAGNOSIS — M51.36 DDD (DEGENERATIVE DISC DISEASE), LUMBAR: ICD-10-CM

## 2022-07-08 RX ORDER — PREGABALIN 100 MG/1
100 CAPSULE ORAL 3 TIMES DAILY
Qty: 90 CAPSULE | Refills: 0
Start: 2022-07-08 | End: 2022-07-11 | Stop reason: SDUPTHER

## 2022-07-11 ENCOUNTER — OFFICE VISIT (OUTPATIENT)
Dept: PAIN MANAGEMENT | Age: 52
End: 2022-07-11
Payer: COMMERCIAL

## 2022-07-11 VITALS
DIASTOLIC BLOOD PRESSURE: 88 MMHG | BODY MASS INDEX: 35.77 KG/M2 | OXYGEN SATURATION: 99 % | HEART RATE: 106 BPM | WEIGHT: 228.4 LBS | SYSTOLIC BLOOD PRESSURE: 141 MMHG

## 2022-07-11 DIAGNOSIS — F39 MOOD DISORDER (HCC): ICD-10-CM

## 2022-07-11 DIAGNOSIS — G89.4 CHRONIC PAIN SYNDROME: ICD-10-CM

## 2022-07-11 DIAGNOSIS — M79.18 MYOFASCIAL PAIN: ICD-10-CM

## 2022-07-11 DIAGNOSIS — M47.816 FACET SYNDROME, LUMBAR: ICD-10-CM

## 2022-07-11 DIAGNOSIS — F51.01 PRIMARY INSOMNIA: ICD-10-CM

## 2022-07-11 DIAGNOSIS — R10.84 GENERALIZED ABDOMINAL PAIN: ICD-10-CM

## 2022-07-11 DIAGNOSIS — M70.62 TROCHANTERIC BURSITIS OF BOTH HIPS: ICD-10-CM

## 2022-07-11 DIAGNOSIS — M16.11 PRIMARY OSTEOARTHRITIS OF RIGHT HIP: ICD-10-CM

## 2022-07-11 DIAGNOSIS — M70.61 TROCHANTERIC BURSITIS OF BOTH HIPS: ICD-10-CM

## 2022-07-11 DIAGNOSIS — M54.16 LUMBAR RADICULOPATHY: ICD-10-CM

## 2022-07-11 DIAGNOSIS — M51.36 DDD (DEGENERATIVE DISC DISEASE), LUMBAR: ICD-10-CM

## 2022-07-11 DIAGNOSIS — M79.7 FIBROMYALGIA: ICD-10-CM

## 2022-07-11 DIAGNOSIS — M96.1 FAILED BACK SURGICAL SYNDROME: ICD-10-CM

## 2022-07-11 PROCEDURE — 99214 OFFICE O/P EST MOD 30 MIN: CPT | Performed by: INTERNAL MEDICINE

## 2022-07-11 RX ORDER — CELECOXIB 200 MG/1
200 CAPSULE ORAL DAILY
Qty: 60 CAPSULE | Refills: 1 | Status: SHIPPED | OUTPATIENT
Start: 2022-07-11 | End: 2022-08-08 | Stop reason: SDUPTHER

## 2022-07-11 RX ORDER — LIDOCAINE 36 MG/1
PATCH TOPICAL
Qty: 60 PATCH | Refills: 0 | Status: SHIPPED | OUTPATIENT
Start: 2022-07-11 | End: 2022-08-08 | Stop reason: SDUPTHER

## 2022-07-11 RX ORDER — PREGABALIN 100 MG/1
100 CAPSULE ORAL 3 TIMES DAILY
Qty: 90 CAPSULE | Refills: 0
Start: 2022-07-11 | End: 2022-08-08

## 2022-07-11 RX ORDER — DULOXETIN HYDROCHLORIDE 60 MG/1
60 CAPSULE, DELAYED RELEASE ORAL DAILY
Qty: 30 CAPSULE | Refills: 1 | Status: SHIPPED | OUTPATIENT
Start: 2022-07-11 | End: 2022-08-08 | Stop reason: SDUPTHER

## 2022-07-11 RX ORDER — HYDROCODONE BITARTRATE AND ACETAMINOPHEN 7.5; 325 MG/1; MG/1
1 TABLET ORAL EVERY 6 HOURS PRN
Qty: 84 TABLET | Refills: 0
Start: 2022-07-11 | End: 2022-08-08 | Stop reason: SDUPTHER

## 2022-07-11 RX ORDER — NORTRIPTYLINE HYDROCHLORIDE 25 MG/1
25-50 CAPSULE ORAL NIGHTLY
Qty: 60 CAPSULE | Refills: 1 | Status: SHIPPED | OUTPATIENT
Start: 2022-07-11 | End: 2022-08-08 | Stop reason: SDUPTHER

## 2022-07-11 NOTE — PROGRESS NOTES
Angela Lorees  1970  [id number]    HISTORY OF PRESENT ILLNESS:  Ms. Gisell Rasmussen is a 46 y.o. female returns for a follow up visit for multiple medical problems. Her  presenting problems are   1. Chronic pain syndrome    2. Lumbar radiculopathy    3. Failed back surgical syndrome    4. Fibromyalgia    5. Trochanteric bursitis of both hips    6. Primary insomnia    7. Generalized abdominal pain    8. Myofascial pain    9. Facet syndrome, lumbar    10. DDD (degenerative disc disease), lumbar    11. Primary osteoarthritis of right hip    12. Mood disorder ([facility] [address]    . As per information/history obtained from the PADT(patient assessment and documentation tool) -  She complains of pain in the lower back with radiation to the buttocks and hips Bilateral She rates the pain 5/10 and describes it as sharp, aching. Pain is made worse by: movement. Current treatment regimen has helped relieve about 30% of the pain. She denies side effects from the current pain regimen. Patient reports that since the last follow up visit the physical functioning is unchanged, family/social relationships are unchanged, mood is unchanged and sleep patterns are unchanged, and that the overall functioning is unchanged. Patient denies neurological bowel or bladder. Patient denies misusing/abusing her narcotic pain medications or using any illegal drugs. There are No indicators for possible drug abuse, addiction or diversion problems. Upon obtaining the medical history from Ms. Gisell Rasmussen regarding today's office visit for her presenting problems, patient complains her lower back, has been hurting a lot. She says she has been using Norco as needed along with the other adjuvants. She mentions she was on vacation and had walked a lot, now having increase pain. She says she is using Celebrex daily. She reports she is using Lyrica 100 mg TID. Patient states her sleep is fair. Has fairly normal sleep latency.  Averages about 4-6 hours of sleep a night. Denies any signs of sleep apnea. Feels somewhat rested in the morning. She says she is using Pamelor. Patient's  subjective report of her mood is fair. she describes occasional symptoms of depression, occasional  irritability and some mood swings. Describes her mood as being neutral and reports some pleasure in her daily activities. Reports  fair  appetite, energy and concentration. Able to function well in different aspects of her daily activities. Denies suicidal or homicidal ideation. Denies any complaints of increased tension, does   Worry sometimes and occasional  irritability  she denies any c/o increased anxiety, No c/o panic attacks or symptoms of PTSD. She states she is using Cymbalta. ALLERGIES/PAST MED/FAM/SOC HISTORY: Ms. Marc Ware allergies, past medical, family and social history were reviewed in the chart. Ms. Marc Ware current medications are   Outpatient Medications Prior to Visit   Medication Sig Dispense Refill    estradiol (ESTRACE) 0.1 MG/GM vaginal cream Place 0.5 g vaginally      fluticasone (FLONASE) 50 MCG/ACT nasal spray 2 sprays by Nasal route daily      pregabalin (LYRICA) 100 MG capsule Take 1 capsule by mouth 3 times daily for 30 days. 90 capsule 0    celecoxib (CELEBREX) 200 MG capsule Take 1 capsule by mouth daily 60 capsule 1    DULoxetine (CYMBALTA) 60 MG extended release capsule Take 1 capsule by mouth daily 30 capsule 1    nortriptyline (PAMELOR) 25 MG capsule Take 1-2 capsules by mouth nightly 60 capsule 1     No facility-administered medications prior to visit. REVIEW OF SYSTEMS: .   Respiratory: Negative for shortness of breath. Cardiovascular: Negative for chest pain, palpitations  Gastrointestinal: Negative for blood in stool, abdominal distention, nausea, vomiting, abdominal pain, diarrhea,constipation.   Neurological: Negative for speech difficulty, weakness and light-headedness, dizziness, tremors, sleepiness  Psychiatric/Behavioral: Negative for suicidal ideas, hallucinations, behavioral problems, self-injury, decreased concentration/cognition, agitation, confusion. PHYSICAL EXAM:   Nursing note and vitals reviewed. BP (!) 141/88   Pulse (!) 106   Wt 228 lb 6.4 oz (103.6 kg)   SpO2 99%   BMI 35.77 kg/m²   General Appearance: Patient is well nourished, well developed, well groomed and in no acute distress. Skin: Skin is warm and dry, good turgor . No rash or lesions noted. She is not diaphoretic. Pulmonary/Chest: Effort normal. No respiratory distress or use of accessory muscles. Auscultation revealing normal air entry. She does not have wheezes in the lung fields. She has no rales. Cardiovascular: Normal rate, regular rhythm, normal heart sounds, and does not have murmur. Exam reveals no gallop and no friction rub. Musculoskeletal / Extremities: Range of motion is normal. Gait is normal, assistive devices use: none. She exhibits edema: none, and no tenderness. Neurological/Psychiatric:She is alert and oriented to person, place, and time. Coordination is  normal.   Judgement and Insight is normal  Her mood is Appropriate and affect is Neutral/Euthymic(normal) . Her behavior is normal.   thought content normal.        IMPRESSION:     1. Chronic pain syndrome    2. Lumbar radiculopathy    3. Failed back surgical syndrome    4. Fibromyalgia    5. Trochanteric bursitis of both hips    6. Primary insomnia    7. Generalized abdominal pain    8. Myofascial pain    9. Facet syndrome, lumbar    10. DDD (degenerative disc disease), lumbar    11. Primary osteoarthritis of right hip    [facility]        PLAN:  Informed verbal consent was obtained. -[place] record was obtained and reviewed  for the last one year and no indicators of drug misuse  were found. Any other controlled substance prescriptions  seen on the record have been accounted for, I am aware of the patient receiving these medications. Giancarlo Noe  [place] record will be rechecked as part of office protocol. -ROM/Stretching exercises for back as advised   -Continue with Norco 3 per day as needed   -Start Zt Lido to back   -Start water exercises   -she was advised proper sleep hygiene-told to avoid:use of caffeine or other stimulants after noon, alcohol use near bedtime, long or frequent naps during the day, erratic sleep schedule, heavy meals near bedtime, vigorous exercise near bedtime and use of electronic devices near bedtime   -Continue with Pamelor   -Refusing injections and steroids   -CBT techniques- relaxation therapies such as biofeedback, mindfulness based stress reduction, imagery, cognitive restructuring, problem solving discussed with patient   -Continue with Cymbalta 60 mg   -Continue with Lyrica 300 mg   Ms. Yanet De Jesus will be prescribed  the medications  listed below which are for treatment of her presenting  medical problems which for this visit include:   Diagnoses of Chronic pain syndrome, Lumbar radiculopathy, Failed back surgical syndrome, Fibromyalgia, Trochanteric bursitis of both hips, Primary insomnia, Generalized abdominal pain, Myofascial pain, Facet syndrome, lumbar, DDD (degenerative disc disease), lumbar, Primary osteoarthritis of right hip, and Mood disorder ([facility] [address] were pertinent to this visit. Medications/orders associated with this visit:    Current Outpatient Medications   Medication Sig Dispense Refill    pregabalin (LYRICA) 100 MG capsule Take 1 capsule by mouth 3 times daily for 30 days. 90 capsule 0    celecoxib (CELEBREX) 200 MG capsule Take 1 capsule by mouth daily 60 capsule 1    DULoxetine (CYMBALTA) 60 MG extended release capsule Take 1 capsule by mouth daily 30 capsule 1    nortriptyline (PAMELOR) 25 MG capsule Take 1-2 capsules by mouth nightly 60 capsule 1    HYDROcodone-acetaminophen (NORCO) 7.5-325 MG per tablet Take 1 tablet by mouth every 6 hours as needed for Pain (max 3 per day) for up to 28 days.  84 tablet 0    Lidocaine (ZTLIDO) 1.8 % PTCH Apply 1 patch every 12 hours then off 12 hours 60 patch 0    estradiol (ESTRACE) 0.1 MG/GM vaginal cream Place 0.5 g vaginally      fluticasone (FLONASE) 50 MCG/ACT nasal spray 2 sprays by Nasal route daily       No current facility-administered medications for this visit. Goals of current treatment regimen include improvement in pain, restoration of functioning- with focus on improvement in physical performance, general activity, work or disability,emotional distress, health care utilization and  decreased medication consumption. Will continue to monitor progress towards achieving/maintaining therapeutic goals with special emphasis on  1. Improvement in perceived interfernce  of pain with ADL's. Ability to do home exercises independently. Ability to do household chores indoor and/or outdoor work and social and leisure activities. To increase flexibility/ROM, strength and endurance. Improve psychosocial and physical functioning.- she is not showing any significant progress/or showing regression  towards this goal and reassessment and adjustment of goals/treatment have been made. 2. Improving sleep to 6-7 hours a night. Improve mood/ anxiety and depression symptoms such as crying spells, low energy, problems with concentration, motivation. - she is showing progression towards this treatment goal with the current regimen. 3. Reduction of reliance on opioid analgesia/more appropriate opioid use. - she is showing progression towards this treatment goal with the current regimen. 4  Risks and benefits of the medications and other alternative treatments have been/were  discussed with the patient. Any questions on the  common side effects of these medications were also answered.   She was advised against drinking alcohol with the narcotic pain medicines, advised against driving or handling machinery when  starting or adjusting the dose of medicines, feeling groggy or drowsy, or if having any cognitive

## 2022-08-08 ENCOUNTER — OFFICE VISIT (OUTPATIENT)
Dept: PAIN MANAGEMENT | Age: 52
End: 2022-08-08
Payer: COMMERCIAL

## 2022-08-08 VITALS
SYSTOLIC BLOOD PRESSURE: 151 MMHG | OXYGEN SATURATION: 100 % | DIASTOLIC BLOOD PRESSURE: 103 MMHG | WEIGHT: 232 LBS | HEART RATE: 96 BPM | BODY MASS INDEX: 36.34 KG/M2

## 2022-08-08 DIAGNOSIS — M51.36 DDD (DEGENERATIVE DISC DISEASE), LUMBAR: ICD-10-CM

## 2022-08-08 DIAGNOSIS — R10.84 GENERALIZED ABDOMINAL PAIN: ICD-10-CM

## 2022-08-08 DIAGNOSIS — M79.7 FIBROMYALGIA: ICD-10-CM

## 2022-08-08 DIAGNOSIS — G89.4 CHRONIC PAIN SYNDROME: ICD-10-CM

## 2022-08-08 DIAGNOSIS — M16.11 PRIMARY OSTEOARTHRITIS OF RIGHT HIP: ICD-10-CM

## 2022-08-08 DIAGNOSIS — M47.816 FACET SYNDROME, LUMBAR: ICD-10-CM

## 2022-08-08 DIAGNOSIS — M70.62 TROCHANTERIC BURSITIS OF BOTH HIPS: ICD-10-CM

## 2022-08-08 DIAGNOSIS — M70.61 TROCHANTERIC BURSITIS OF BOTH HIPS: ICD-10-CM

## 2022-08-08 DIAGNOSIS — F51.01 PRIMARY INSOMNIA: ICD-10-CM

## 2022-08-08 DIAGNOSIS — M54.16 LUMBAR RADICULOPATHY: ICD-10-CM

## 2022-08-08 DIAGNOSIS — M96.1 FAILED BACK SURGICAL SYNDROME: ICD-10-CM

## 2022-08-08 DIAGNOSIS — M79.18 MYOFASCIAL PAIN: ICD-10-CM

## 2022-08-08 PROCEDURE — 99214 OFFICE O/P EST MOD 30 MIN: CPT | Performed by: INTERNAL MEDICINE

## 2022-08-08 RX ORDER — CELECOXIB 200 MG/1
200 CAPSULE ORAL DAILY
Qty: 60 CAPSULE | Refills: 1 | Status: SHIPPED | OUTPATIENT
Start: 2022-08-08 | End: 2022-08-29 | Stop reason: SDUPTHER

## 2022-08-08 RX ORDER — METHYLPREDNISOLONE 4 MG/1
TABLET ORAL
Qty: 1 KIT | Refills: 0 | Status: SHIPPED | OUTPATIENT
Start: 2022-08-08 | End: 2022-08-29

## 2022-08-08 RX ORDER — PREGABALIN 150 MG/1
150 CAPSULE ORAL 3 TIMES DAILY
Qty: 90 CAPSULE | Refills: 1 | Status: SHIPPED | OUTPATIENT
Start: 2022-08-08 | End: 2022-08-29 | Stop reason: SDUPTHER

## 2022-08-08 RX ORDER — DULOXETIN HYDROCHLORIDE 60 MG/1
60 CAPSULE, DELAYED RELEASE ORAL DAILY
Qty: 30 CAPSULE | Refills: 1 | Status: SHIPPED | OUTPATIENT
Start: 2022-08-08 | End: 2022-08-29 | Stop reason: SDUPTHER

## 2022-08-08 RX ORDER — HYDROCODONE BITARTRATE AND ACETAMINOPHEN 7.5; 325 MG/1; MG/1
1 TABLET ORAL EVERY 6 HOURS PRN
Qty: 84 TABLET | Refills: 0 | Status: SHIPPED | OUTPATIENT
Start: 2022-08-08 | End: 2022-08-29 | Stop reason: SDUPTHER

## 2022-08-08 RX ORDER — LIDOCAINE 36 MG/1
PATCH TOPICAL
Qty: 60 PATCH | Refills: 0 | Status: SHIPPED | OUTPATIENT
Start: 2022-08-08 | End: 2022-08-29 | Stop reason: SDUPTHER

## 2022-08-08 RX ORDER — NORTRIPTYLINE HYDROCHLORIDE 25 MG/1
25-50 CAPSULE ORAL NIGHTLY
Qty: 60 CAPSULE | Refills: 1 | Status: SHIPPED | OUTPATIENT
Start: 2022-08-08 | End: 2022-08-29 | Stop reason: SDUPTHER

## 2022-08-08 NOTE — PROGRESS NOTES
Dora Angela  1970  [id number]    HISTORY OF PRESENT ILLNESS:  Ms. Garima Walter is a 46 y.o. female returns for a follow up visit for multiple medical problems. Her  presenting problems are   1. Chronic pain syndrome    2. Generalized abdominal pain    3. Lumbar radiculopathy    4. Myofascial pain    5. Failed back surgical syndrome    6. Facet syndrome, lumbar    7. Fibromyalgia    8. DDD (degenerative disc disease), lumbar    9. Trochanteric bursitis of both hips    10. Primary insomnia    11. Primary osteoarthritis of right hip    . As per information/history obtained from the PADT(patient assessment and documentation tool) -  She complains of pain in the lower back with radiation to the hips Bilateral She rates the pain 7/10 and describes it as aching, burning. Pain is made worse by: movement, walking, standing, sitting, bending, lifting. Current treatment regimen has helped relieve about 40% of the pain. She denies side effects from the current pain regimen. Patient reports that since the last follow up visit the physical functioning is worse, family/social relationships are unchanged, mood is unchanged and sleep patterns are worse, and that the overall functioning is worse. Patient denies neurological bowel or bladder. Patient denies misusing/abusing her narcotic pain medications or using any illegal drugs. There are No indicators for possible drug abuse, addiction or diversion problems. Upon obtaining the medical history from Ms. Garima Walter regarding today's office visit for her presenting problems, patient states she is having increase pain in the lower back and hips. Ms. Garima Walter states she will be returning to work next week. She states she has been in terrible pain with radiation into right leg. Patient states she saw Ortho for her hip pain. She states she saw Ortho for her hip, she states it's painful. Patient reports she had xray's done and was told she has all OA.  Patient states her sleep is fair. Has fairly normal sleep latency. Averages about 4-6 hours of sleep a night. Denies any signs of sleep apnea. Feels somewhat rested in the morning. Patient's  subjective report of her mood is fair. she describes occasional symptoms of depression, occasional  irritability and some mood swings. Describes her mood as being neutral and reports some pleasure in her daily activities. Reports  fair  appetite, energy and concentration. Able to function well in different aspects of her daily activities. Denies suicidal or homicidal ideation. Denies any complaints of increased tension, does   Worry sometimes and occasional  irritability  she denies any c/o increased anxiety, No c/o panic attacks or symptoms of PTSD, she is on Cymbalta 60 mg. ALLERGIES/PAST MED/FAM/SOC HISTORY: Ms. Emilee Funk allergies, past medical, family and social history were reviewed in the chart. Ms. Emilee Funk current medications are   Outpatient Medications Prior to Visit   Medication Sig Dispense Refill    estradiol (ESTRACE) 0.1 MG/GM vaginal cream Place 0.5 g vaginally      fluticasone (FLONASE) 50 MCG/ACT nasal spray 2 sprays by Nasal route daily      pregabalin (LYRICA) 100 MG capsule Take 1 capsule by mouth 3 times daily for 30 days. 90 capsule 0    celecoxib (CELEBREX) 200 MG capsule Take 1 capsule by mouth daily 60 capsule 1    DULoxetine (CYMBALTA) 60 MG extended release capsule Take 1 capsule by mouth daily 30 capsule 1    nortriptyline (PAMELOR) 25 MG capsule Take 1-2 capsules by mouth nightly 60 capsule 1    HYDROcodone-acetaminophen (NORCO) 7.5-325 MG per tablet Take 1 tablet by mouth every 6 hours as needed for Pain (max 3 per day) for up to 28 days. 84 tablet 0    Lidocaine (ZTLIDO) 1.8 % PTCH Apply 1 patch every 12 hours then off 12 hours 60 patch 0     No facility-administered medications prior to visit. REVIEW OF SYSTEMS: .   Respiratory: Negative for shortness of breath.     Cardiovascular: Negative for chest pain, palpitations  Gastrointestinal: Negative for blood in stool, abdominal distention, nausea, vomiting, abdominal pain, diarrhea,constipation. Neurological: Negative for speech difficulty, weakness and light-headedness, dizziness, tremors, sleepiness  Psychiatric/Behavioral: Negative for suicidal ideas, hallucinations, behavioral problems, self-injury, decreased concentration/cognition, agitation, confusion. PHYSICAL EXAM:   Nursing note and vitals reviewed. BP (!) 151/103   Pulse 96   Wt 232 lb (105.2 kg)   SpO2 100%   BMI 36.34 kg/m²   General Appearance: Patient is well nourished, well developed, well groomed and in no acute distress. Skin: Skin is warm and dry, good turgor . No rash or lesions noted. She is not diaphoretic. Pulmonary/Chest: Effort normal. No respiratory distress or use of accessory muscles. Auscultation revealing normal air entry. She does not have wheezes in the lung fields. She has no rales. Cardiovascular: Normal rate, regular rhythm, normal heart sounds, and does not have murmur. Exam reveals no gallop and no friction rub. Musculoskeletal / Extremities: Range of motion is normal. Gait is normal, assistive devices use: none. She exhibits edema: none, and no tenderness. Neurological/Psychiatric:She is alert and oriented to person, place, and time. Coordination is  normal.   Judgement and Insight is normal  Her mood is Appropriate and affect is Neutral/Euthymic(normal) . Her behavior is normal.   thought content normal.        IMPRESSION:     1. Chronic pain syndrome    2. Generalized abdominal pain    3. Lumbar radiculopathy    4. Myofascial pain    5. Failed back surgical syndrome    6. Facet syndrome, lumbar    7. Fibromyalgia    8. DDD (degenerative disc disease), lumbar    9. Trochanteric bursitis of both hips    10. Primary insomnia    11.  Primary osteoarthritis of right hip        PLAN:  Informed verbal consent was obtained.  -Start Medrol pack  -Increase Lyrica to 150 mg TID   -Continue with Celebrex 200 mg   -CBT techniques- relaxation therapies such as biofeedback, mindfulness based stress reduction, imagery, cognitive restructuring, problem solving discussed with patient   -Continue with Cymbalta 60 mg   -she was advised proper sleep hygiene-told to avoid:use of caffeine or other stimulants after noon, alcohol use near bedtime, long or frequent naps during the day, erratic sleep schedule, heavy meals near bedtime, vigorous exercise near bedtime and use of electronic devices near bedtime   -Continue with Pamelor  -Continue with Norco 7.5 mg TID PRN  -She was advised to increase fluids ( 5-7  glasses of fluid daily), limit caffeine, avoid cheese products, increase dietary fiber, increase activity and exercise as tolerated and relax regularly and enjoy meals     Ms. Sincere Dutta will be prescribed  the medications  listed below which are for treatment of her presenting  medical problems which for this visit include:   Diagnoses of Chronic pain syndrome, Generalized abdominal pain, Lumbar radiculopathy, Myofascial pain, Failed back surgical syndrome, Facet syndrome, lumbar, Fibromyalgia, DDD (degenerative disc disease), lumbar, Trochanteric bursitis of both hips, Primary insomnia, and Primary osteoarthritis of right hip were pertinent to this visit. Medications/orders associated with this visit:    Current Outpatient Medications   Medication Sig Dispense Refill    pregabalin (LYRICA) 150 MG capsule Take 1 capsule by mouth in the morning and 1 capsule at noon and 1 capsule in the evening. Do all this for 30 days. 90 capsule 1    celecoxib (CELEBREX) 200 MG capsule Take 1 capsule by mouth in the morning. 60 capsule 1    DULoxetine (CYMBALTA) 60 MG extended release capsule Take 1 capsule by mouth in the morning.  30 capsule 1    nortriptyline (PAMELOR) 25 MG capsule Take 1-2 capsules by mouth nightly 60 capsule 1    HYDROcodone-acetaminophen (NORCO) 7.5-325 MG per tablet Take 1 tablet by mouth every 6 hours as needed for Pain (max 3 per day) for up to 28 days. 84 tablet 0    Lidocaine (ZTLIDO) 1.8 % PTCH Apply 1 patch every 12 hours then off 12 hours 60 patch 0    methylPREDNISolone (MEDROL, DORETHA,) 4 MG tablet Use as directed 1 kit 0    estradiol (ESTRACE) 0.1 MG/GM vaginal cream Place 0.5 g vaginally      fluticasone (FLONASE) 50 MCG/ACT nasal spray 2 sprays by Nasal route daily       No current facility-administered medications for this visit. Goals of current treatment regimen include improvement in pain, restoration of functioning- with focus on improvement in physical performance, general activity, work or disability,emotional distress, health care utilization and  decreased medication consumption. Will continue to monitor progress towards achieving/maintaining therapeutic goals with special emphasis on  1. Improvement in perceived interfernce  of pain with ADL's. Ability to do home exercises independently. Ability to do household chores indoor and/or outdoor work and social and leisure activities. To increase flexibility/ROM, strength and endurance. Improve psychosocial and physical functioning.- she is showing progression towards this treatment goal with the current regimen. 2. Improving sleep to 6-7 hours a night. Improve mood/ anxiety and depression symptoms such as crying spells, low energy, problems with concentration, motivation. - she is showing progression towards this treatment goal with the current regimen. 3. Reduction of reliance on opioid analgesia/more appropriate opioid use. - she is showing progression towards this treatment goal with the current regimen. Risks and benefits of the medications and other alternative treatments have been/were  discussed with the patient. Any questions on the  common side effects of these medications were also answered.   She was advised against drinking alcohol with the narcotic pain medicines, advised against driving or handling machinery when  starting or adjusting the dose of medicines, feeling groggy or drowsy, or if having any cognitive issues related to the current medications. Sheis fully aware of the risk of overdose and death, if medicines are misused and not taken as prescribed. If she develops new symptoms or if the symptoms worsen, she was told to call the office. .  Thank you for allowing me to participate in the care of this patient.     Aniket Ivan MD    Cc: Dr.Dori Adam Quiroz

## 2022-08-10 ENCOUNTER — TELEPHONE (OUTPATIENT)
Dept: PAIN MANAGEMENT | Age: 52
End: 2022-08-10

## 2022-08-10 NOTE — TELEPHONE ENCOUNTER
Submitted PA for ZTLIDO  [facility]ia [facility] Key: [id number] STATUS: DENIED. PA ARCHIVED MY FRIENDS.

## 2022-08-29 ENCOUNTER — OFFICE VISIT (OUTPATIENT)
Dept: PAIN MANAGEMENT | Age: 52
End: 2022-08-29
Payer: COMMERCIAL

## 2022-08-29 VITALS
DIASTOLIC BLOOD PRESSURE: 88 MMHG | SYSTOLIC BLOOD PRESSURE: 136 MMHG | HEART RATE: 86 BPM | BODY MASS INDEX: 37.21 KG/M2 | WEIGHT: 237.6 LBS | OXYGEN SATURATION: 98 %

## 2022-08-29 DIAGNOSIS — M96.1 FAILED BACK SURGICAL SYNDROME: ICD-10-CM

## 2022-08-29 DIAGNOSIS — M79.7 FIBROMYALGIA: ICD-10-CM

## 2022-08-29 DIAGNOSIS — R10.84 GENERALIZED ABDOMINAL PAIN: ICD-10-CM

## 2022-08-29 DIAGNOSIS — M47.816 FACET SYNDROME, LUMBAR: ICD-10-CM

## 2022-08-29 DIAGNOSIS — M51.36 DDD (DEGENERATIVE DISC DISEASE), LUMBAR: ICD-10-CM

## 2022-08-29 DIAGNOSIS — M79.18 MYOFASCIAL PAIN: ICD-10-CM

## 2022-08-29 DIAGNOSIS — M54.16 LUMBAR RADICULOPATHY: ICD-10-CM

## 2022-08-29 DIAGNOSIS — M16.11 PRIMARY OSTEOARTHRITIS OF RIGHT HIP: ICD-10-CM

## 2022-08-29 DIAGNOSIS — G89.4 CHRONIC PAIN SYNDROME: ICD-10-CM

## 2022-08-29 PROCEDURE — 99213 OFFICE O/P EST LOW 20 MIN: CPT | Performed by: INTERNAL MEDICINE

## 2022-08-29 RX ORDER — LIDOCAINE 36 MG/1
PATCH TOPICAL
Qty: 60 PATCH | Refills: 1 | Status: SHIPPED | OUTPATIENT
Start: 2022-08-29 | End: 2022-10-03 | Stop reason: SDUPTHER

## 2022-08-29 RX ORDER — NORTRIPTYLINE HYDROCHLORIDE 25 MG/1
25-50 CAPSULE ORAL NIGHTLY
Qty: 60 CAPSULE | Refills: 1 | Status: SHIPPED | OUTPATIENT
Start: 2022-08-29 | End: 2022-10-03 | Stop reason: SDUPTHER

## 2022-08-29 RX ORDER — HYDROCODONE BITARTRATE AND ACETAMINOPHEN 7.5; 325 MG/1; MG/1
1 TABLET ORAL EVERY 6 HOURS PRN
Qty: 84 TABLET | Refills: 0 | Status: SHIPPED | OUTPATIENT
Start: 2022-08-29 | End: 2022-10-03 | Stop reason: SDUPTHER

## 2022-08-29 RX ORDER — CELECOXIB 200 MG/1
200 CAPSULE ORAL DAILY
Qty: 60 CAPSULE | Refills: 1 | Status: SHIPPED | OUTPATIENT
Start: 2022-08-29 | End: 2022-10-03 | Stop reason: SDUPTHER

## 2022-08-29 RX ORDER — DULOXETIN HYDROCHLORIDE 60 MG/1
60 CAPSULE, DELAYED RELEASE ORAL DAILY
Qty: 30 CAPSULE | Refills: 1 | Status: SHIPPED | OUTPATIENT
Start: 2022-08-29 | End: 2022-10-03 | Stop reason: SDUPTHER

## 2022-08-29 RX ORDER — PREGABALIN 150 MG/1
150 CAPSULE ORAL 3 TIMES DAILY
Qty: 90 CAPSULE | Refills: 1 | Status: SHIPPED | OUTPATIENT
Start: 2022-08-29 | End: 2022-10-03 | Stop reason: SDUPTHER

## 2022-08-29 NOTE — PROGRESS NOTES
Sanjeev Dutton  1970  [id number]      HISTORY OF PRESENT ILLNESS:  Ms. Lexi Mchugh is a 45 y.o. female returns for a follow up visit for pain management  She has a diagnosis of   1. Chronic pain syndrome    2. Generalized abdominal pain    3. Lumbar radiculopathy    4. Myofascial pain    5. Failed back surgical syndrome    6. Facet syndrome, lumbar    7. Fibromyalgia    8. DDD (degenerative disc disease), lumbar    9. Trochanteric bursitis of both hips    10. Primary insomnia    11. Primary osteoarthritis of right hip    12. Mood disorder ([address]    . She complains of pain in the  Upper and lower back, Bilateral hands, left hand   She rates the pain 5/10 and describes it as sharp, aching. Current treatment regimen has helped relieve about 30% of the pain. She denies any side effects from the current pain regimen. Patient reports that since the last follow up visit the physical functioning is worse, family/social relationships are unchanged, mood is unchanged sleep patterns are unchanged, and that the overall functioning is unchanged. Patient denies misusing/abusing her narcotic pain medications or using any illegal drugs. There are No indicators for possible drug abuse, addiction or diversion problems.  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 She complains she had pulled her upper back  and had went to the ER. She mentions she was given some medications and is better now. She denies any constipation symptoms. She says she is using Norco 3 per day. She states her weight has been stale. ALLERGIES: Patients list of allergies were reviewed     MEDICATIONS: Ms. Lexi Mchugh list of medications were reviewed. Her current medications are   Outpatient Medications Prior to Visit   Medication Sig Dispense Refill    estradiol (ESTRACE) 0.1 MG/GM vaginal cream Place 0.5 g vaginally      fluticasone (FLONASE) 50 MCG/ACT nasal spray 2 sprays by Nasal route daily      pregabalin (LYRICA) 150 MG capsule Take 1 capsule by mouth in the morning and 1 capsule at noon and 1 capsule in the evening. Do all this for 30 days. 90 capsule 1    celecoxib (CELEBREX) 200 MG capsule Take 1 capsule by mouth in the morning. 60 capsule 1    DULoxetine (CYMBALTA) 60 MG extended release capsule Take 1 capsule by mouth in the morning. 30 capsule 1    nortriptyline (PAMELOR) 25 MG capsule Take 1-2 capsules by mouth nightly 60 capsule 1    HYDROcodone-acetaminophen (NORCO) 7.5-325 MG per tablet Take 1 tablet by mouth every 6 hours as needed for Pain (max 3 per day) for up to 28 days. 84 tablet 0    Lidocaine (ZTLIDO) 1.8 % PTCH Apply 1 patch every 12 hours then off 12 hours 60 patch 0    methylPREDNISolone (MEDROL, DORETHA,) 4 MG tablet Use as directed 1 kit 0     No facility-administered medications prior to visit. REVIEW OF SYSTEMS:    Respiratory: Negative for apnea, chest tightness and shortness of breath or change in baseline breathing. PHYSICAL EXAM:   Nursing note and vitals reviewed. /88   Pulse 86   Wt 237 lb 9.6 oz (107.8 kg)   SpO2 98%   BMI 37.21 kg/m²   Constitutional: She appears well-developed and well-nourished. No acute distress. Cardiovascular: Normal rate, regular rhythm, normal heart sounds, and does not have murmur. Pulmonary/Chest: Effort normal. No respiratory distress. She does not have wheezes in the lung fields. She has no rales. Neurological/Psychiatric:She is alert and oriented to person, place, and time. Coordination is  normal.  Her mood isAppropriate and affect is Neutral/Euthymic(normal) . IMPRESSION:   1. Chronic pain syndrome    2. Generalized abdominal pain    3. Lumbar radiculopathy    4. Myofascial pain    5. Failed back surgical syndrome    6. Facet syndrome, lumbar    7. Fibromyalgia    8. DDD (degenerative disc disease), lumbar    9.  Primary osteoarthritis of right hip        PLAN:  Informed verbal consent was obtained  -ROM/stretching exercises as advised -She was advised to increase fluids ( 5-7  glasses of fluid daily), limit caffeine, avoid cheese products, increase dietary fiber, increase activity and exercise as tolerated and relax regularly and enjoy meals   -Continue with all other adjuvants as before   -Continue with Norco 3 per day    Current Outpatient Medications   Medication Sig Dispense Refill    DULoxetine (CYMBALTA) 60 MG extended release capsule Take 1 capsule by mouth daily 30 capsule 1    nortriptyline (PAMELOR) 25 MG capsule Take 1-2 capsules by mouth nightly 60 capsule 1    HYDROcodone-acetaminophen (NORCO) 7.5-325 MG per tablet Take 1 tablet by mouth every 6 hours as needed for Pain (max 3 per day) for up to 28 days. 84 tablet 0    Lidocaine (ZTLIDO) 1.8 % PTCH Apply 1 patch every 12 hours then off 12 hours 60 patch 1    pregabalin (LYRICA) 150 MG capsule Take 1 capsule by mouth three times daily for 30 days. 90 capsule 1    celecoxib (CELEBREX) 200 MG capsule Take 1 capsule by mouth daily 60 capsule 1    estradiol (ESTRACE) 0.1 MG/GM vaginal cream Place 0.5 g vaginally      fluticasone (FLONASE) 50 MCG/ACT nasal spray 2 sprays by Nasal route daily       No current facility-administered medications for this visit. I will continue her current medication regimen  which is part of the above treatment schedule. It has been helping with Ms. Sánchez's chronic  medical problems which for this visit include:   Diagnoses of Chronic pain syndrome, Generalized abdominal pain, Lumbar radiculopathy, Myofascial pain, Failed back surgical syndrome, Facet syndrome, lumbar, Fibromyalgia, DDD (degenerative disc disease), lumbar, Trochanteric bursitis of both hips, Primary insomnia, Primary osteoarthritis of right hip, and Mood disorder ([facility] [address] were pertinent to this visit. Risks and benefits of the medications and other alternative treatments  including no treatment were discussed with the patient. The common side effects of these medications were also explained to the patient. Informed verbal consent was obtained. Goals of current treatment regimen include improvement in pain, restoration of functioning- with focus on improvement in physical performance, general activity, work or disability,emotional distress, health care utilization and  decreased medication consumption. Will continue to monitor progress towards achieving/maintaining therapeutic goals with special emphasis on  1. Improvement in perceived interfernce  of pain with ADL's. Ability to do home exercises independently. Ability to do household chores indoor and/or outdoor work and social and leisure activities. Improve psychosocial and physical functioning. - she is showing progression towards this treatment goal with the current regimen. She was advised against drinking alcohol with the narcotic pain medicines, advised against driving or handling machinery while adjusting the dose of medicines or if having cognitive  issues related to the current medications. Risk of overdose and death, if medicines not taken as prescribed, were also discussed. If the patient develops new symptoms or if the symptoms worsen, the patient should call the office. While transcribing every attempt was made to maintain the accuracy of the note in terms of it's contents,there may have been some errors made inadvertently. Thank you for allowing me to participate in the care of this patient.     Taz Jenkins MD.    Cc: Dr.Dori Cherrie Lu

## 2022-09-06 ENCOUNTER — TELEPHONE (OUTPATIENT)
Dept: PAIN MANAGEMENT | Age: 52
End: 2022-09-06

## 2022-09-06 NOTE — TELEPHONE ENCOUNTER
Submitted PA for HYDROCODONE  Via CM Key: [id number]  STATUS: \": Approved, Coverage Starts on: 9/6/2022 12:00:00 AM, Coverage Ends on: 3/5/2023\"    The patient was notified by .

## 2022-10-03 ENCOUNTER — OFFICE VISIT (OUTPATIENT)
Dept: PAIN MANAGEMENT | Age: 52
End: 2022-10-03
Payer: COMMERCIAL

## 2022-10-03 VITALS
SYSTOLIC BLOOD PRESSURE: 132 MMHG | HEART RATE: 107 BPM | BODY MASS INDEX: 37.43 KG/M2 | WEIGHT: 239 LBS | DIASTOLIC BLOOD PRESSURE: 89 MMHG

## 2022-10-03 DIAGNOSIS — M79.7 FIBROMYALGIA: ICD-10-CM

## 2022-10-03 DIAGNOSIS — M70.62 TROCHANTERIC BURSITIS OF BOTH HIPS: ICD-10-CM

## 2022-10-03 DIAGNOSIS — M51.36 DDD (DEGENERATIVE DISC DISEASE), LUMBAR: ICD-10-CM

## 2022-10-03 DIAGNOSIS — F39 MOOD DISORDER (HCC): ICD-10-CM

## 2022-10-03 DIAGNOSIS — M47.816 FACET SYNDROME, LUMBAR: ICD-10-CM

## 2022-10-03 DIAGNOSIS — F51.01 PRIMARY INSOMNIA: ICD-10-CM

## 2022-10-03 DIAGNOSIS — M54.16 LUMBAR RADICULOPATHY: ICD-10-CM

## 2022-10-03 DIAGNOSIS — M70.61 TROCHANTERIC BURSITIS OF BOTH HIPS: ICD-10-CM

## 2022-10-03 DIAGNOSIS — M16.11 PRIMARY OSTEOARTHRITIS OF RIGHT HIP: ICD-10-CM

## 2022-10-03 DIAGNOSIS — G89.4 CHRONIC PAIN SYNDROME: ICD-10-CM

## 2022-10-03 DIAGNOSIS — M79.18 MYOFASCIAL PAIN: ICD-10-CM

## 2022-10-03 DIAGNOSIS — M96.1 FAILED BACK SURGICAL SYNDROME: ICD-10-CM

## 2022-10-03 DIAGNOSIS — R10.84 GENERALIZED ABDOMINAL PAIN: ICD-10-CM

## 2022-10-03 PROCEDURE — 99214 OFFICE O/P EST MOD 30 MIN: CPT | Performed by: INTERNAL MEDICINE

## 2022-10-03 RX ORDER — NORTRIPTYLINE HYDROCHLORIDE 25 MG/1
25-50 CAPSULE ORAL NIGHTLY
Qty: 60 CAPSULE | Refills: 1 | Status: SHIPPED | OUTPATIENT
Start: 2022-10-03

## 2022-10-03 RX ORDER — HYDROCODONE BITARTRATE AND ACETAMINOPHEN 7.5; 325 MG/1; MG/1
1 TABLET ORAL EVERY 6 HOURS PRN
Qty: 105 TABLET | Refills: 0 | Status: SHIPPED | OUTPATIENT
Start: 2022-10-03 | End: 2022-11-07

## 2022-10-03 RX ORDER — DULOXETIN HYDROCHLORIDE 60 MG/1
60 CAPSULE, DELAYED RELEASE ORAL DAILY
Qty: 30 CAPSULE | Refills: 1 | Status: SHIPPED | OUTPATIENT
Start: 2022-10-03

## 2022-10-03 RX ORDER — LIDOCAINE 36 MG/1
PATCH TOPICAL
Qty: 60 PATCH | Refills: 1 | Status: SHIPPED | OUTPATIENT
Start: 2022-10-03

## 2022-10-03 RX ORDER — PREGABALIN 150 MG/1
150 CAPSULE ORAL 3 TIMES DAILY
Qty: 90 CAPSULE | Refills: 1 | Status: SHIPPED | OUTPATIENT
Start: 2022-10-03 | End: 2022-11-02

## 2022-10-03 RX ORDER — CELECOXIB 200 MG/1
200 CAPSULE ORAL DAILY
Qty: 60 CAPSULE | Refills: 1 | Status: SHIPPED | OUTPATIENT
Start: 2022-10-03

## 2022-10-03 NOTE — PROGRESS NOTES
Sudarshan Liu  1970  [id number]    HISTORY OF PRESENT ILLNESS:  Ms. Femi Fernandez is a 45 y.o. female returns for a follow up visit for multiple medical problems. Her  presenting problems are   1. Chronic pain syndrome    2. Generalized abdominal pain    3. Lumbar radiculopathy    4. Myofascial pain    5. Failed back surgical syndrome    6. Facet syndrome, lumbar    7. Fibromyalgia    8. DDD (degenerative disc disease), lumbar    9. Primary osteoarthritis of right hip    10. Trochanteric bursitis of both hips    11. Primary insomnia    12. Mood disorder ([facility] [address]    . As per information/history obtained from the PADT(patient assessment and documentation tool) -  She complains of pain in the lower back with radiation to the buttocks and hips Right She rates the pain 4/10 and describes it as aching. Pain is made worse by: movement. Current treatment regimen has helped relieve about 50% of the pain. She denies side effects from the current pain regimen. Patient reports that since the last follow up visit the physical functioning is unchanged, family/social relationships are unchanged, mood is unchanged and sleep patterns are unchanged, and that the overall functioning is unchanged. Patient denies neurological bowel or bladder. Patient denies misusing/abusing her narcotic pain medications or using any illegal drugs. There are No indicators for possible drug abuse, addiction or diversion problems. Upon obtaining the medical history from Ms. Femi Fernandez regarding today's office visit for her presenting problems, patient states she has been doing about the same, she has good and bad days. Ms. Femi Fernandez reports she is working full time still. She complains of her bilateral hips and lower back hurting. Patient reports her weight has been stable. She mentions she is using Norco 3 per day. Patient states her sleep is fair. Has fairly normal sleep latency. Averages about 4-6 hours of sleep a night.  Denies any signs of sleep apnea. Feels somewhat rested in the morning, she is on Pamelor. Patient's  subjective report of her mood is fair. she describes occasional symptoms of depression, occasional  irritability and some mood swings. Describes her mood as being neutral and reports some pleasure in her daily activities. Reports  fair  appetite, energy and concentration. Able to function well in different aspects of her daily activities. Denies suicidal or homicidal ideation. Denies any complaints of increased tension, does   Worry sometimes and occasional  irritability  she denies any c/o increased anxiety, No c/o panic attacks or symptoms of PTSD, she is on Cymbalta 60 mg. ALLERGIES/PAST MED/FAM/SOC HISTORY: Ms. Genoveva Severino allergies, past medical, family and social history were reviewed in the chart. Ms. Genoveva Severino current medications are   Outpatient Medications Prior to Visit   Medication Sig Dispense Refill    estradiol (ESTRACE) 0.1 MG/GM vaginal cream Place 0.5 g vaginally      fluticasone (FLONASE) 50 MCG/ACT nasal spray 2 sprays by Nasal route daily      DULoxetine (CYMBALTA) 60 MG extended release capsule Take 1 capsule by mouth daily 30 capsule 1    nortriptyline (PAMELOR) 25 MG capsule Take 1-2 capsules by mouth nightly 60 capsule 1    HYDROcodone-acetaminophen (NORCO) 7.5-325 MG per tablet Take 1 tablet by mouth every 6 hours as needed for Pain (max 3 per day) for up to 28 days. 84 tablet 0    Lidocaine (ZTLIDO) 1.8 % PTCH Apply 1 patch every 12 hours then off 12 hours 60 patch 1    pregabalin (LYRICA) 150 MG capsule Take 1 capsule by mouth three times daily for 30 days. 90 capsule 1    celecoxib (CELEBREX) 200 MG capsule Take 1 capsule by mouth daily 60 capsule 1     No facility-administered medications prior to visit. REVIEW OF SYSTEMS: .   Respiratory: Negative for shortness of breath.     Cardiovascular: Negative for chest pain, palpitations  Gastrointestinal: Negative for blood in stool, abdominal distention, nausea, vomiting, abdominal pain, diarrhea,constipation. Neurological: Negative for speech difficulty, weakness and light-headedness, dizziness, tremors, sleepiness  Psychiatric/Behavioral: Negative for suicidal ideas, hallucinations, behavioral problems, self-injury, decreased concentration/cognition, agitation, confusion. PHYSICAL EXAM:   Nursing note and vitals reviewed. /89   Pulse (!) 107   Wt 239 lb (108.4 kg)   BMI 37.43 kg/m²   General Appearance: Patient is well nourished, well developed, well groomed and in no acute distress. Skin: Skin is warm and dry, good turgor . No rash or lesions noted. She is not diaphoretic. Pulmonary/Chest: Effort normal. No respiratory distress or use of accessory muscles. Auscultation revealing normal air entry. She does not have wheezes in the lung fields. She has no rales. Cardiovascular: Normal rate, regular rhythm, normal heart sounds, and does not have murmur. Exam reveals no gallop and no friction rub. Musculoskeletal / Extremities: Range of motion is normal. Gait is normal, assistive devices use: none. She exhibits edema: none, and no tenderness. Neurological/Psychiatric:She is alert and oriented to person, place, and time. Coordination is  normal.   Judgement and Insight is normal  Her mood is Appropriate and affect is Neutral/Euthymic(normal) . Her behavior is normal.   thought content normal.        IMPRESSION:     1. Chronic pain syndrome    2. Generalized abdominal pain    3. Lumbar radiculopathy    4. Myofascial pain    5. Failed back surgical syndrome    6. Facet syndrome, lumbar    7. Fibromyalgia    8. DDD (degenerative disc disease), lumbar    9. Primary osteoarthritis of right hip    10. Trochanteric bursitis of both hips    11. Primary insomnia    12. Mood disorder ([facility]        PLAN:  Informed verbal consent was obtained.   -Urine drug screen with GC/MS for opiates and drugs of abuse was ordered and will follow up on results.   -She was advised weight reduction, diet changes- 800-1200 maricarmen diet, diet diary, exercising, nutritional  consult increased physical activity as tolerated   -Continue with Norco 3 per day  -she was advised proper sleep hygiene-told to avoid:use of caffeine or other stimulants after noon, alcohol use near bedtime, long or frequent naps during the day, erratic sleep schedule, heavy meals near bedtime, vigorous exercise near bedtime and use of electronic devices near bedtime   -Continue with Pamelor   -CBT techniques- relaxation therapies such as biofeedback, mindfulness based stress reduction, imagery, cognitive restructuring, problem solving discussed with patient   -Continue with Cymbalta   -Continue with Lyrica 150 mg TID  -Can use OTC Topical Voltaren Gel  -Continue with Celebrex PRN  Ms. Ritchie Baum will be prescribed  the medications  listed below which are for treatment of her presenting  medical problems which for this visit include:   Diagnoses of Chronic pain syndrome, Generalized abdominal pain, Lumbar radiculopathy, Myofascial pain, Failed back surgical syndrome, Facet syndrome, lumbar, Fibromyalgia, DDD (degenerative disc disease), lumbar, Primary osteoarthritis of right hip, Trochanteric bursitis of both hips, Primary insomnia, and Mood disorder ([facility] [address] were pertinent to this visit. Medications/orders associated with this visit:    Current Outpatient Medications   Medication Sig Dispense Refill    DULoxetine (CYMBALTA) 60 MG extended release capsule Take 1 capsule by mouth daily 30 capsule 1    nortriptyline (PAMELOR) 25 MG capsule Take 1-2 capsules by mouth nightly 60 capsule 1    HYDROcodone-acetaminophen (NORCO) 7.5-325 MG per tablet Take 1 tablet by mouth every 6 hours as needed for Pain for up to 35 days. 105 tablet 0    Lidocaine (ZTLIDO) 1.8 % PTCH Apply 1 patch every 12 hours then off 12 hours 60 patch 1    pregabalin (LYRICA) 150 MG capsule Take 1 capsule by mouth three times daily for 30 days.  90 capsule 1 celecoxib (CELEBREX) 200 MG capsule Take 1 capsule by mouth daily 60 capsule 1    estradiol (ESTRACE) 0.1 MG/GM vaginal cream Place 0.5 g vaginally      fluticasone (FLONASE) 50 MCG/ACT nasal spray 2 sprays by Nasal route daily       No current facility-administered medications for this visit. Goals of current treatment regimen include improvement in pain, restoration of functioning- with focus on improvement in physical performance, general activity, work or disability,emotional distress, health care utilization and  decreased medication consumption. Will continue to monitor progress towards achieving/maintaining therapeutic goals with special emphasis on  1. Improvement in perceived interfernce  of pain with ADL's. Ability to do home exercises independently. Ability to do household chores indoor and/or outdoor work and social and leisure activities. To increase flexibility/ROM, strength and endurance. Improve psychosocial and physical functioning.- she is showing progression towards this treatment goal with the current regimen. 2. Improving sleep to 6-7 hours a night. Improve mood/ anxiety and depression symptoms such as crying spells, low energy, problems with concentration, motivation. - she is showing progression towards this treatment goal with the current regimen. 3. Reduction of reliance on opioid analgesia/more appropriate opioid use. - she is showing progression towards this treatment goal with the current regimen. Risks and benefits of the medications and other alternative treatments have been/were  discussed with the patient. Any questions on the  common side effects of these medications were also answered. She was advised against drinking alcohol with the narcotic pain medicines, advised against driving or handling machinery when  starting or adjusting the dose of medicines, feeling groggy or drowsy, or if having any cognitive issues related to the current medications.  Sheis fully aware of the risk of overdose and death, if medicines are misused and not taken as prescribed. If she develops new symptoms or if the symptoms worsen, she was told to call the office. .  Thank you for allowing me to participate in the care of this patient.     Janyth Boeck, MD    Cc: Dr.Dori Leonard Cary

## 2022-10-14 ENCOUNTER — PATIENT MESSAGE (OUTPATIENT)
Dept: PAIN MANAGEMENT | Age: 52
End: 2022-10-14

## 2022-10-25 NOTE — TELEPHONE ENCOUNTER
From: Bing Tobin  To: Dr. Liane Moeller: 10/14/2022 1:09 PM EDT  Subject: Lower Back Pain/Worse    Hello, I am a patient of Dr Yuni Lawson and I have an appointment on November 7. I have been telling him about pain on my lower back LEFT side. For a couple days now it is so worse, its very tender and its excruciating pain in my groin and lower back when I try to lift my left leg. He mentioned having an MRI at my pass visits and I was just wondering if he might be able to set one up for me before the seventh? In the past I have had them with contrast and without due to lots of scar tissue from previous surgeries. Thanks in advance, Drew Brown.

## 2022-11-07 ENCOUNTER — OFFICE VISIT (OUTPATIENT)
Dept: PAIN MANAGEMENT | Age: 52
End: 2022-11-07
Payer: COMMERCIAL

## 2022-11-07 VITALS
WEIGHT: 238 LBS | HEART RATE: 94 BPM | DIASTOLIC BLOOD PRESSURE: 83 MMHG | SYSTOLIC BLOOD PRESSURE: 134 MMHG | BODY MASS INDEX: 37.28 KG/M2

## 2022-11-07 DIAGNOSIS — M96.1 FAILED BACK SURGICAL SYNDROME: ICD-10-CM

## 2022-11-07 DIAGNOSIS — M47.816 FACET SYNDROME, LUMBAR: ICD-10-CM

## 2022-11-07 DIAGNOSIS — M51.36 DDD (DEGENERATIVE DISC DISEASE), LUMBAR: ICD-10-CM

## 2022-11-07 DIAGNOSIS — F39 MOOD DISORDER (HCC): ICD-10-CM

## 2022-11-07 DIAGNOSIS — G89.4 CHRONIC PAIN SYNDROME: ICD-10-CM

## 2022-11-07 DIAGNOSIS — R10.84 GENERALIZED ABDOMINAL PAIN: ICD-10-CM

## 2022-11-07 DIAGNOSIS — M70.62 TROCHANTERIC BURSITIS OF BOTH HIPS: ICD-10-CM

## 2022-11-07 DIAGNOSIS — M54.16 LUMBAR RADICULOPATHY: ICD-10-CM

## 2022-11-07 DIAGNOSIS — M16.11 PRIMARY OSTEOARTHRITIS OF RIGHT HIP: ICD-10-CM

## 2022-11-07 DIAGNOSIS — F51.01 PRIMARY INSOMNIA: ICD-10-CM

## 2022-11-07 DIAGNOSIS — M79.7 FIBROMYALGIA: ICD-10-CM

## 2022-11-07 DIAGNOSIS — M79.18 MYOFASCIAL PAIN: ICD-10-CM

## 2022-11-07 DIAGNOSIS — M70.61 TROCHANTERIC BURSITIS OF BOTH HIPS: ICD-10-CM

## 2022-11-07 PROCEDURE — 99214 OFFICE O/P EST MOD 30 MIN: CPT | Performed by: INTERNAL MEDICINE

## 2022-11-07 RX ORDER — HYDROCODONE BITARTRATE AND ACETAMINOPHEN 7.5; 325 MG/1; MG/1
1 TABLET ORAL 3 TIMES DAILY PRN
Qty: 105 TABLET | Refills: 0 | Status: SHIPPED | OUTPATIENT
Start: 2022-11-07 | End: 2022-12-12

## 2022-11-07 RX ORDER — PREGABALIN 150 MG/1
150 CAPSULE ORAL 3 TIMES DAILY
Qty: 90 CAPSULE | Refills: 1 | Status: SHIPPED | OUTPATIENT
Start: 2022-11-07 | End: 2022-12-07

## 2022-11-07 RX ORDER — DULOXETIN HYDROCHLORIDE 60 MG/1
60 CAPSULE, DELAYED RELEASE ORAL DAILY
Qty: 30 CAPSULE | Refills: 1 | Status: SHIPPED | OUTPATIENT
Start: 2022-11-07

## 2022-11-07 NOTE — PROGRESS NOTES
[facility]  1970  [id number]    HISTORY OF PRESENT ILLNESS:  Ms. Bryan Acharya is a 45 y.o. female returns for a follow up visit for multiple medical problems. Her  presenting problems are   1. Chronic pain syndrome    2. Lumbar radiculopathy    3. Failed back surgical syndrome    4. Fibromyalgia    5. Primary osteoarthritis of right hip    6. Myofascial pain    7. Facet syndrome, lumbar    8. DDD (degenerative disc disease), lumbar    9. Trochanteric bursitis of both hips    10. Generalized abdominal pain    11. Primary insomnia    12. Mood disorder ([address]    . As per information/history obtained from the PADT(patient assessment and documentation tool) -  She complains of pain in the lower back with radiation to the buttocks She rates the pain 4/10 and describes it as sharp, aching, burning. Pain is made worse by: movement, walking, standing, sitting, bending, lifting. Current treatment regimen has helped relieve about 40% of the pain. She denies side effects from the current pain regimen. Patient reports that since the last follow up visit the physical functioning is unchanged, family/social relationships are unchanged, mood is unchanged and sleep patterns are unchanged, and that the overall functioning is unchanged. Patient denies neurological bowel or bladder. Patient denies misusing/abusing her narcotic pain medications or using any illegal drugs. There are No indicators for possible drug abuse, addiction or diversion problems. Upon obtaining the medical history from Ms. Bryan Acharya regarding today's office visit for her presenting problems, patient complains her back had gave out 3 weeks ago, states it is slow getting better, but still aches a lot and is sore touch. She mentions she is using Celebrex along with Norco 2-3 per day. She denies any side effects. 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 Patient states she sleeps well. Has normal sleep latency.  Averages about 5-7 hours of sleep a night. Denies any signs of sleep apnea. Feels rested in the AM. Denies any sleep attacks during the day. Medication regimen helps with maintaining/regulating sleep. She says she is on Pamelor. Patient's  subjective report of her mood is fair. she describes occasional symptoms of depression, occasional  irritability and some mood swings. Describes her mood as being neutral and reports some pleasure in her daily activities. Reports  fair  appetite, energy and concentration. Able to function well in different aspects of her daily activities. Denies suicidal or homicidal ideation. Denies any complaints of increased tension, does   Worry sometimes and occasional  irritability  she denies any c/o increased anxiety, No c/o panic attacks or symptoms of PTSD. She states she is using Cymbalta. She denies any weight gain. ALLERGIES/PAST MED/FAM/SOC HISTORY: Ms. Cecilia Weathers allergies, past medical, family and social history were reviewed in the chart. Ms. Cecilia Weathers current medications are   Outpatient Medications Prior to Visit   Medication Sig Dispense Refill    nortriptyline (PAMELOR) 25 MG capsule Take 1-2 capsules by mouth nightly 60 capsule 1    Lidocaine (ZTLIDO) 1.8 % PTCH Apply 1 patch every 12 hours then off 12 hours 60 patch 1    celecoxib (CELEBREX) 200 MG capsule Take 1 capsule by mouth daily 60 capsule 1    estradiol (ESTRACE) 0.1 MG/GM vaginal cream Place 0.5 g vaginally      fluticasone (FLONASE) 50 MCG/ACT nasal spray 2 sprays by Nasal route daily      DULoxetine (CYMBALTA) 60 MG extended release capsule Take 1 capsule by mouth daily 30 capsule 1    HYDROcodone-acetaminophen (NORCO) 7.5-325 MG per tablet Take 1 tablet by mouth every 6 hours as needed for Pain for up to 35 days. 105 tablet 0    pregabalin (LYRICA) 150 MG capsule Take 1 capsule by mouth three times daily for 30 days. 90 capsule 1     No facility-administered medications prior to visit.        REVIEW OF SYSTEMS: .   Respiratory: Negative for shortness of breath. Cardiovascular: Negative for chest pain, palpitations  Gastrointestinal: Negative for blood in stool, abdominal distention, nausea, vomiting, abdominal pain, diarrhea,constipation. Neurological: Negative for speech difficulty, weakness and light-headedness, dizziness, tremors, sleepiness  Psychiatric/Behavioral: Negative for suicidal ideas, hallucinations, behavioral problems, self-injury, decreased concentration/cognition, agitation, confusion. PHYSICAL EXAM:   Nursing note and vitals reviewed. /83   Pulse 94   Wt 238 lb (108 kg)   BMI 37.28 kg/m²   General Appearance: Patient is well nourished, well developed, well groomed and in no acute distress. Skin: Skin is warm and dry, good turgor . No rash or lesions noted. She is not diaphoretic. Pulmonary/Chest: Effort normal. No respiratory distress or use of accessory muscles. Auscultation revealing normal air entry. She does not have wheezes in the lung fields. She has no rales. Cardiovascular: Normal rate, regular rhythm, normal heart sounds, and does not have murmur. Exam reveals no gallop and no friction rub. Musculoskeletal / Extremities: Range of motion is normal. Gait is normal, assistive devices use: none. She exhibits edema: none, and no tenderness. Neurological/Psychiatric:She is alert and oriented to person, place, and time. Coordination is  normal.   Judgement and Insight is normal  Her mood is Appropriate and affect is Neutral/Euthymic(normal) . Her behavior is normal.   thought content normal.        IMPRESSION:     1. Chronic pain syndrome    2. Lumbar radiculopathy    3. Failed back surgical syndrome    4. Fibromyalgia    5. Primary osteoarthritis of right hip    6. Myofascial pain    7. Facet syndrome, lumbar    8. DDD (degenerative disc disease), lumbar    9. Trochanteric bursitis of both hips    10. Generalized abdominal pain    11. Primary insomnia    12.  Mood disorder ([facility] PLAN:  Informed verbal consent was obtained. -[place] record was obtained and reviewed  for the last one year and no indicators of drug misuse  were found. Any other controlled substance prescriptions  seen on the record have been accounted for, I am aware of the patient receiving these medications. Carlita Aponte [place] record will be rechecked as part of office protocol.    -She was advised weight reduction, diet changes- 800-1200 maricarmen diet, diet diary, exercising, nutritional  consult increased physical activity as tolerated   -Madelin exercises given   -Continue with Celebrex 200 mg daily   -Walking as tolerated   -Continue with Norco 3-4 per day   -CBT techniques- relaxation therapies such as biofeedback, mindfulness based stress reduction, imagery, cognitive restructuring, problem solving discussed with patient   -Continue with Cymblata 60 mg   -she was advised proper sleep hygiene-told to avoid:use of caffeine or other stimulants after noon, alcohol use near bedtime, long or frequent naps during the day, erratic sleep schedule, heavy meals near bedtime, vigorous exercise near bedtime and use of electronic devices near bedtime   -Continue with Pamelor   -Continue with Lyrica 450 mg per day   -MRI of Lumbar spine reviewed   -If symptoms continue, consider a medrol pack   -Patient's urine drug screen results with GC/MS confirmation were obtained and reviewed and were negative for any illicit drugs. Prescribed medications were within acceptable range.    Ms. Tavares Chandler will be prescribed  the medications  listed below which are for treatment of her presenting  medical problems which for this visit include:   Diagnoses of Chronic pain syndrome, Lumbar radiculopathy, Failed back surgical syndrome, Fibromyalgia, Primary osteoarthritis of right hip, Myofascial pain, Facet syndrome, lumbar, DDD (degenerative disc disease), lumbar, Trochanteric bursitis of both hips, Generalized abdominal pain, Primary insomnia, and Mood disorder [facility]) were pertinent to this visit. Medications/orders associated with this visit:    Current Outpatient Medications   Medication Sig Dispense Refill    DULoxetine (CYMBALTA) 60 MG extended release capsule Take 1 capsule by mouth daily 30 capsule 1    HYDROcodone-acetaminophen (NORCO) 7.5-325 MG per tablet Take 1 tablet by mouth 3 times daily as needed for Pain for up to 35 days. 105 tablet 0    pregabalin (LYRICA) 150 MG capsule Take 1 capsule by mouth three times daily for 30 days. 90 capsule 1    nortriptyline (PAMELOR) 25 MG capsule Take 1-2 capsules by mouth nightly 60 capsule 1    Lidocaine (ZTLIDO) 1.8 % PTCH Apply 1 patch every 12 hours then off 12 hours 60 patch 1    celecoxib (CELEBREX) 200 MG capsule Take 1 capsule by mouth daily 60 capsule 1    estradiol (ESTRACE) 0.1 MG/GM vaginal cream Place 0.5 g vaginally      fluticasone (FLONASE) 50 MCG/ACT nasal spray 2 sprays by Nasal route daily       No current facility-administered medications for this visit. Goals of current treatment regimen include improvement in pain, restoration of functioning- with focus on improvement in physical performance, general activity, work or disability,emotional distress, health care utilization and  decreased medication consumption. Will continue to monitor progress towards achieving/maintaining therapeutic goals with special emphasis on  1. Improvement in perceived interfernce  of pain with ADL's. Ability to do home exercises independently. Ability to do household chores indoor and/or outdoor work and social and leisure activities. To increase flexibility/ROM, strength and endurance. Improve psychosocial and physical functioning.- she is not showing any significant progress/or showing regression  towards this goal and reassessment and adjustment of goals/treatment have been made. 2. Improving sleep to 6-7 hours a night.  Improve mood/ anxiety and depression symptoms such as crying spells, low energy, problems with concentration, motivation. - she is showing progression towards this treatment goal with the current regimen. 3. Reduction of reliance on opioid analgesia/more appropriate opioid use. - she is showing progression towards this treatment goal with the current regimen. Risks and benefits of the medications and other alternative treatments have been/were  discussed with the patient. Any questions on the  common side effects of these medications were also answered. She was advised against drinking alcohol with the narcotic pain medicines, advised against driving or handling machinery when  starting or adjusting the dose of medicines, feeling groggy or drowsy, or if having any cognitive issues related to the current medications. Sheis fully aware of the risk of overdose and death, if medicines are misused and not taken as prescribed. If she develops new symptoms or if the symptoms worsen, she was told to call the office. .  Thank you for allowing me to participate in the care of this patient.     Brigid Walker MD    Cc: Dr.Dori Miguel Adams

## 2022-12-12 ENCOUNTER — OFFICE VISIT (OUTPATIENT)
Dept: PAIN MANAGEMENT | Age: 52
End: 2022-12-12

## 2022-12-12 VITALS
WEIGHT: 239 LBS | OXYGEN SATURATION: 98 % | HEART RATE: 97 BPM | DIASTOLIC BLOOD PRESSURE: 93 MMHG | SYSTOLIC BLOOD PRESSURE: 150 MMHG | BODY MASS INDEX: 37.43 KG/M2

## 2022-12-12 DIAGNOSIS — M16.11 PRIMARY OSTEOARTHRITIS OF RIGHT HIP: ICD-10-CM

## 2022-12-12 DIAGNOSIS — M96.1 FAILED BACK SURGICAL SYNDROME: ICD-10-CM

## 2022-12-12 DIAGNOSIS — M70.62 TROCHANTERIC BURSITIS OF BOTH HIPS: ICD-10-CM

## 2022-12-12 DIAGNOSIS — M79.18 MYOFASCIAL PAIN: ICD-10-CM

## 2022-12-12 DIAGNOSIS — M79.7 FIBROMYALGIA: ICD-10-CM

## 2022-12-12 DIAGNOSIS — G89.4 CHRONIC PAIN SYNDROME: ICD-10-CM

## 2022-12-12 DIAGNOSIS — M47.816 FACET SYNDROME, LUMBAR: ICD-10-CM

## 2022-12-12 DIAGNOSIS — M51.36 DDD (DEGENERATIVE DISC DISEASE), LUMBAR: ICD-10-CM

## 2022-12-12 DIAGNOSIS — M70.61 TROCHANTERIC BURSITIS OF BOTH HIPS: ICD-10-CM

## 2022-12-12 DIAGNOSIS — R10.84 GENERALIZED ABDOMINAL PAIN: ICD-10-CM

## 2022-12-12 DIAGNOSIS — M54.16 LUMBAR RADICULOPATHY: ICD-10-CM

## 2022-12-12 RX ORDER — NORTRIPTYLINE HYDROCHLORIDE 25 MG/1
25-50 CAPSULE ORAL NIGHTLY
Qty: 60 CAPSULE | Refills: 1 | Status: SHIPPED | OUTPATIENT
Start: 2022-12-12

## 2022-12-12 RX ORDER — HYDROCODONE BITARTRATE AND ACETAMINOPHEN 7.5; 325 MG/1; MG/1
1 TABLET ORAL 3 TIMES DAILY PRN
Qty: 105 TABLET | Refills: 0 | Status: SHIPPED | OUTPATIENT
Start: 2022-12-12 | End: 2023-01-16

## 2022-12-12 RX ORDER — DULOXETIN HYDROCHLORIDE 60 MG/1
60 CAPSULE, DELAYED RELEASE ORAL DAILY
Qty: 30 CAPSULE | Refills: 1 | Status: SHIPPED | OUTPATIENT
Start: 2022-12-12

## 2022-12-12 RX ORDER — PREGABALIN 150 MG/1
150 CAPSULE ORAL 3 TIMES DAILY
Qty: 90 CAPSULE | Refills: 1 | Status: SHIPPED | OUTPATIENT
Start: 2022-12-12 | End: 2023-01-11

## 2022-12-12 RX ORDER — CELECOXIB 200 MG/1
200 CAPSULE ORAL DAILY
Qty: 60 CAPSULE | Refills: 1 | Status: SHIPPED | OUTPATIENT
Start: 2022-12-12

## 2022-12-12 RX ORDER — LIDOCAINE 36 MG/1
PATCH TOPICAL
Qty: 60 PATCH | Refills: 1 | Status: SHIPPED | OUTPATIENT
Start: 2022-12-12

## 2022-12-12 NOTE — PROGRESS NOTES
Mark Dipti  1970  [id number]      HISTORY OF PRESENT ILLNESS:  Ms. Bren Baum is a 45 y.o. female returns for a follow up visit for pain management  She has a diagnosis of   1. Chronic pain syndrome    2. Lumbar radiculopathy    3. Failed back surgical syndrome    4. Fibromyalgia    5. Primary osteoarthritis of right hip    6. Myofascial pain    7. Facet syndrome, lumbar    8. DDD (degenerative disc disease), lumbar    9. Trochanteric bursitis of both hips    10. Primary insomnia    11. Generalized abdominal pain    . She complains of pain in the  lower back, right hip, right hand  She rates the pain 5/10 and describes it as aching. Current treatment regimen has helped relieve about 50% of the pain. She denies any side effects from the current pain regimen. Patient reports that since the last follow up visit the physical functioning is unchanged, family/social relationships are unchanged, mood is unchanged sleep patterns are unchanged, and that the overall functioning is unchanged. Patient denies misusing/abusing her narcotic pain medications or using any illegal drugs. There are No indicators for possible drug abuse, addiction or diversion problems, patient states she has been doing fair, she is managing okay with the medications. Ms. Reid Divine 3 per day along with the other adjuvants. She mentions she is using Celebrex daily. Patient reports she is working full time still. Patient reports her weight has been stable. Patient denies any constipation symptoms. ALLERGIES: Patients list of allergies were reviewed     MEDICATIONS: Ms. Bren aBum list of medications were reviewed. Her current medications are   Outpatient Medications Prior to Visit   Medication Sig Dispense Refill    DULoxetine (CYMBALTA) 60 MG extended release capsule Take 1 capsule by mouth daily 30 capsule 1    HYDROcodone-acetaminophen (NORCO) 7.5-325 MG per tablet Take 1 tablet by mouth 3 times daily as needed for Pain for up to 35 days. 105 tablet 0    nortriptyline (PAMELOR) 25 MG capsule Take 1-2 capsules by mouth nightly 60 capsule 1    Lidocaine (ZTLIDO) 1.8 % PTCH Apply 1 patch every 12 hours then off 12 hours 60 patch 1    celecoxib (CELEBREX) 200 MG capsule Take 1 capsule by mouth daily 60 capsule 1    estradiol (ESTRACE) 0.1 MG/GM vaginal cream Place 0.5 g vaginally      fluticasone (FLONASE) 50 MCG/ACT nasal spray 2 sprays by Nasal route daily      pregabalin (LYRICA) 150 MG capsule Take 1 capsule by mouth three times daily for 30 days. 90 capsule 1     No facility-administered medications prior to visit. REVIEW OF SYSTEMS:    Respiratory: Negative for apnea, chest tightness and shortness of breath or change in baseline breathing. PHYSICAL EXAM:   Nursing note and vitals reviewed. BP (!) 150/93 (Site: Left Upper Arm, Position: Sitting)   Pulse 97   Wt 239 lb (108.4 kg)   SpO2 98%   BMI 37.43 kg/m²   Constitutional: She appears well-developed and well-nourished. No acute distress. Cardiovascular: Normal rate, regular rhythm, normal heart sounds, and does not have murmur. Pulmonary/Chest: Effort normal. No respiratory distress. She does not have wheezes in the lung fields. She has no rales. Neurological/Psychiatric:She is alert and oriented to person, place, and time. Coordination is  normal.  Her mood isAppropriate and affect is Neutral/Euthymic(normal) . Her    IMPRESSION:   1. Chronic pain syndrome    2. Lumbar radiculopathy    3. Failed back surgical syndrome    4. Fibromyalgia    5. Primary osteoarthritis of right hip    6. Myofascial pain    7. Facet syndrome, lumbar    8. DDD (degenerative disc disease), lumbar    9. Trochanteric bursitis of both hips    10.  Generalized abdominal pain        PLAN:  Informed verbal consent was obtained  -ROM/Stretching exercises as advised   -She was advised to increase fluids ( 5-7  glasses of fluid daily), limit caffeine, avoid cheese products, increase dietary fiber, increase activity and exercise as tolerated and relax regularly and enjoy meals   -Continue with Norco 5 mg TID   -She was advised weight reduction, diet changes- 800-1200 maricarmen diet, diet diary, exercising, nutritional  consult increased physical activity as tolerated   -Continue with all other adjuvant medications as before    Current Outpatient Medications   Medication Sig Dispense Refill    DULoxetine (CYMBALTA) 60 MG extended release capsule Take 1 capsule by mouth daily 30 capsule 1    HYDROcodone-acetaminophen (NORCO) 7.5-325 MG per tablet Take 1 tablet by mouth 3 times daily as needed for Pain for up to 35 days. 105 tablet 0    nortriptyline (PAMELOR) 25 MG capsule Take 1-2 capsules by mouth nightly 60 capsule 1    Lidocaine (ZTLIDO) 1.8 % PTCH Apply 1 patch every 12 hours then off 12 hours 60 patch 1    celecoxib (CELEBREX) 200 MG capsule Take 1 capsule by mouth daily 60 capsule 1    estradiol (ESTRACE) 0.1 MG/GM vaginal cream Place 0.5 g vaginally      fluticasone (FLONASE) 50 MCG/ACT nasal spray 2 sprays by Nasal route daily      pregabalin (LYRICA) 150 MG capsule Take 1 capsule by mouth three times daily for 30 days. 90 capsule 1     No current facility-administered medications for this visit. I will continue her current medication regimen  which is part of the above treatment schedule. It has been helping with Ms. Sánchez's chronic  medical problems which for this visit include:   Diagnoses of Chronic pain syndrome, Lumbar radiculopathy, Failed back surgical syndrome, Fibromyalgia, Primary osteoarthritis of right hip, Myofascial pain, Facet syndrome, lumbar, DDD (degenerative disc disease), lumbar, Trochanteric bursitis of both hips, Primary insomnia, and Generalized abdominal pain were pertinent to this visit. Risks and benefits of the medications and other alternative treatments  including no treatment were discussed with the patient. The common side effects of these medications were also explained to the patient. Informed verbal consent was obtained. Goals of current treatment regimen include improvement in pain, restoration of functioning- with focus on improvement in physical performance, general activity, work or disability,emotional distress, health care utilization and  decreased medication consumption. Will continue to monitor progress towards achieving/maintaining therapeutic goals with special emphasis on  1. Improvement in perceived interfernce  of pain with ADL's. Ability to do home exercises independently. Ability to do household chores indoor and/or outdoor work and social and leisure activities. Improve psychosocial and physical functioning. - she is showing progression towards this treatment goal with the current regimen. She was advised against drinking alcohol with the narcotic pain medicines, advised against driving or handling machinery while adjusting the dose of medicines or if having cognitive  issues related to the current medications. Risk of overdose and death, if medicines not taken as prescribed, were also discussed. If the patient develops new symptoms or if the symptoms worsen, the patient should call the office. While transcribing every attempt was made to maintain the accuracy of the note in terms of it's contents,there may have been some errors made inadvertently. Thank you for allowing me to participate in the care of this patient.     Janett Payton MD.    Cc: Dr.Dori Raj Carrasco

## 2023-01-13 ENCOUNTER — OFFICE VISIT (OUTPATIENT)
Dept: PAIN MANAGEMENT | Age: 53
End: 2023-01-13

## 2023-01-13 VITALS
BODY MASS INDEX: 37.43 KG/M2 | HEART RATE: 83 BPM | SYSTOLIC BLOOD PRESSURE: 122 MMHG | WEIGHT: 239 LBS | DIASTOLIC BLOOD PRESSURE: 73 MMHG

## 2023-01-13 DIAGNOSIS — M51.36 DDD (DEGENERATIVE DISC DISEASE), LUMBAR: ICD-10-CM

## 2023-01-13 DIAGNOSIS — M79.7 FIBROMYALGIA: ICD-10-CM

## 2023-01-13 DIAGNOSIS — G89.4 CHRONIC PAIN SYNDROME: ICD-10-CM

## 2023-01-13 DIAGNOSIS — M79.18 MYOFASCIAL PAIN: ICD-10-CM

## 2023-01-13 DIAGNOSIS — M16.11 PRIMARY OSTEOARTHRITIS OF RIGHT HIP: ICD-10-CM

## 2023-01-13 DIAGNOSIS — R10.84 GENERALIZED ABDOMINAL PAIN: ICD-10-CM

## 2023-01-13 DIAGNOSIS — F51.01 PRIMARY INSOMNIA: ICD-10-CM

## 2023-01-13 DIAGNOSIS — M70.62 TROCHANTERIC BURSITIS OF BOTH HIPS: ICD-10-CM

## 2023-01-13 DIAGNOSIS — F39 MOOD DISORDER (HCC): ICD-10-CM

## 2023-01-13 DIAGNOSIS — M96.1 FAILED BACK SURGICAL SYNDROME: ICD-10-CM

## 2023-01-13 DIAGNOSIS — M54.16 LUMBAR RADICULOPATHY: ICD-10-CM

## 2023-01-13 DIAGNOSIS — M47.816 FACET SYNDROME, LUMBAR: ICD-10-CM

## 2023-01-13 DIAGNOSIS — M70.61 TROCHANTERIC BURSITIS OF BOTH HIPS: ICD-10-CM

## 2023-01-13 RX ORDER — HYDROCODONE BITARTRATE AND ACETAMINOPHEN 7.5; 325 MG/1; MG/1
1 TABLET ORAL 3 TIMES DAILY PRN
Qty: 105 TABLET | Refills: 0 | Status: SHIPPED | OUTPATIENT
Start: 2023-01-13 | End: 2023-02-17

## 2023-01-13 RX ORDER — ALBUTEROL SULFATE 90 UG/1
AEROSOL, METERED RESPIRATORY (INHALATION)
COMMUNITY
Start: 2022-11-10

## 2023-01-13 NOTE — PROGRESS NOTES
[facility]  1970  [id number]    HISTORY OF PRESENT ILLNESS:  Ms. Anna Mott is a 45 y.o. female returns for a follow up visit for multiple medical problems. Her  presenting problems are   1. Chronic pain syndrome    2. Lumbar radiculopathy    3. Failed back surgical syndrome    4. Fibromyalgia    5. Primary osteoarthritis of right hip    6. Myofascial pain    7. Facet syndrome, lumbar    8. DDD (degenerative disc disease), lumbar    9. Trochanteric bursitis of both hips    10. Primary insomnia    11. Generalized abdominal pain    12. Mood disorder ([facility] [address]    . As per information/history obtained from the PADT(patient assessment and documentation tool) -  She complains of pain in the lower back and knees Right  She rates the pain 5/10 and describes it as aching. Pain is made worse by: movement. Current treatment regimen has helped relieve about 20% of the pain. She denies side effects from the current pain regimen. Patient reports that since the last follow up visit the physical functioning is worse, family/social relationships are unchanged, mood is unchanged and sleep patterns are unchanged, and that the overall functioning is unchanged. Patient denies neurological bowel or bladder. Patient denies misusing/abusing her narcotic pain medications or using any illegal drugs. There are No indicators for possible drug abuse, addiction or diversion problems. Upon obtaining the medical history from Ms. Anna Mott regarding today's office visit for her presenting problems, patient states her right knee has been hurting more along with her back and leg. Ms. Anna Mott states she is using Norco 3 per day along with the other adjuvants. Patient denies any constipation symptoms. Patient's  subjective report of her mood is fair. she describes occasional symptoms of depression, occasional  irritability and some mood swings. Describes her mood as being neutral and reports some pleasure in her daily activities.  Reports  fair appetite, energy and concentration. Able to function well in different aspects of her daily activities. Denies suicidal or homicidal ideation. Denies any complaints of increased tension, does   Worry sometimes and occasional  irritability  she denies any c/o increased anxiety, No c/o panic attacks or symptoms of PTSD, she is on Cymbalta. Patient states her sleep is fair. Has fairly normal sleep latency. Averages about 4-6 hours of sleep a night. Denies any signs of sleep apnea. Feels somewhat rested in the morning, she is on Pamelor 25 mg 1-2 nightly. She says she has not been using the Celebrex much. Patient reports her weight has been stable. ALLERGIES/PAST MED/FAM/SOC HISTORY: Ms. Zackary Motley allergies, past medical, family and social history were reviewed in the chart. Ms. Zackary Motley current medications are   Outpatient Medications Prior to Visit   Medication Sig Dispense Refill    albuterol sulfate HFA (PROVENTIL;VENTOLIN;PROAIR) 108 (90 Base) MCG/ACT inhaler       DULoxetine (CYMBALTA) 60 MG extended release capsule Take 1 capsule by mouth daily 30 capsule 1    HYDROcodone-acetaminophen (NORCO) 7.5-325 MG per tablet Take 1 tablet by mouth 3 times daily as needed for Pain for up to 35 days. 105 tablet 0    nortriptyline (PAMELOR) 25 MG capsule Take 1-2 capsules by mouth nightly 60 capsule 1    Lidocaine (ZTLIDO) 1.8 % PTCH Apply 1 patch every 12 hours then off 12 hours 60 patch 1    celecoxib (CELEBREX) 200 MG capsule Take 1 capsule by mouth daily 60 capsule 1    estradiol (ESTRACE) 0.1 MG/GM vaginal cream Place 0.5 g vaginally      fluticasone (FLONASE) 50 MCG/ACT nasal spray 2 sprays by Nasal route daily      pregabalin (LYRICA) 150 MG capsule Take 1 capsule by mouth three times daily for 30 days. 90 capsule 1     No facility-administered medications prior to visit. REVIEW OF SYSTEMS: .   Respiratory: Negative for shortness of breath.     Cardiovascular: Negative for chest pain, palpitations  Gastrointestinal: Negative for blood in stool, abdominal distention, nausea, vomiting, abdominal pain, diarrhea,constipation. Neurological: Negative for speech difficulty, weakness and light-headedness, dizziness, tremors, sleepiness  Psychiatric/Behavioral: Negative for suicidal ideas, hallucinations, behavioral problems, self-injury, decreased concentration/cognition, agitation, confusion. PHYSICAL EXAM:   Nursing note and vitals reviewed. /73   Pulse 83   Wt 239 lb (108.4 kg)   BMI 37.43 kg/m²   General Appearance: Patient is well nourished, well developed, well groomed and in no acute distress. Skin: Skin is warm and dry, good turgor . No rash or lesions noted. She is not diaphoretic. Pulmonary/Chest: Effort normal. No respiratory distress or use of accessory muscles. Auscultation revealing normal air entry. She does not have wheezes in the lung fields. She has no rales. Cardiovascular: Normal rate, regular rhythm, normal heart sounds, and does not have murmur. Exam reveals no gallop and no friction rub. Musculoskeletal / Extremities: Range of motion is normal. Gait is normal, assistive devices use: none. She exhibits edema: none, and no tenderness. Neurological/Psychiatric:She is alert and oriented to person, place, and time. Coordination is  normal.   Judgement and Insight is normal  Her mood is Appropriate and affect is Neutral/Euthymic(normal) . Her behavior is normal.   thought content normal.        IMPRESSION:     1. Chronic pain syndrome    2. Lumbar radiculopathy    3. Failed back surgical syndrome    4. Fibromyalgia    5. Primary osteoarthritis of right hip    6. Myofascial pain    7. Facet syndrome, lumbar    8. DDD (degenerative disc disease), lumbar    9. Trochanteric bursitis of both hips    10. Primary insomnia    11. Generalized abdominal pain    12. [facility] ([facility]        PLAN:  Informed verbal consent was obtained.   -Xray right knee from 2018 reviewed   -Advised to use ice and heat  -Increase Celebrex to 1 po daily for 2 weeks then PRN   -she was advised proper sleep hygiene-told to avoid:use of caffeine or other stimulants after noon, alcohol use near bedtime, long or frequent naps during the day, erratic sleep schedule, heavy meals near bedtime, vigorous exercise near bedtime and use of electronic devices near bedtime   -Continue with Pamelor   -CBT techniques- relaxation therapies such as biofeedback, mindfulness based stress reduction, imagery, cognitive restructuring, problem solving discussed with patient   -Continue with Cymbalta 60 mg   -Continue with Norco 3 per day  -She was advised weight reduction, diet changes- 800-1200 maricarmen diet, diet diary, exercising, nutritional  consult increased physical activity as tolerated   Ms. Barbie Kenny will be prescribed  the medications  listed below which are for treatment of her presenting  medical problems which for this visit include:   Diagnoses of Chronic pain syndrome, Lumbar radiculopathy, Failed back surgical syndrome, Fibromyalgia, Primary osteoarthritis of right hip, Myofascial pain, Facet syndrome, lumbar, DDD (degenerative disc disease), lumbar, Trochanteric bursitis of both hips, Primary insomnia, Generalized abdominal pain, and Mood disorder ([address] were pertinent to this visit. Medications/orders associated with this visit:    Current Outpatient Medications   Medication Sig Dispense Refill    albuterol sulfate HFA (PROVENTIL;VENTOLIN;PROAIR) 108 (90 Base) MCG/ACT inhaler       DULoxetine (CYMBALTA) 60 MG extended release capsule Take 1 capsule by mouth daily 30 capsule 1    HYDROcodone-acetaminophen (NORCO) 7.5-325 MG per tablet Take 1 tablet by mouth 3 times daily as needed for Pain for up to 35 days.  105 tablet 0    nortriptyline (PAMELOR) 25 MG capsule Take 1-2 capsules by mouth nightly 60 capsule 1    Lidocaine (ZTLIDO) 1.8 % PTCH Apply 1 patch every 12 hours then off 12 hours 60 patch 1 celecoxib (CELEBREX) 200 MG capsule Take 1 capsule by mouth daily 60 capsule 1    estradiol (ESTRACE) 0.1 MG/GM vaginal cream Place 0.5 g vaginally      fluticasone (FLONASE) 50 MCG/ACT nasal spray 2 sprays by Nasal route daily      pregabalin (LYRICA) 150 MG capsule Take 1 capsule by mouth three times daily for 30 days. 90 capsule 1     No current facility-administered medications for this visit. Goals of current treatment regimen include improvement in pain, restoration of functioning- with focus on improvement in physical performance, general activity, work or disability,emotional distress, health care utilization and  decreased medication consumption. Will continue to monitor progress towards achieving/maintaining therapeutic goals with special emphasis on  1. Improvement in perceived interfernce  of pain with ADL's. Ability to do home exercises independently. Ability to do household chores indoor and/or outdoor work and social and leisure activities. To increase flexibility/ROM, strength and endurance. Improve psychosocial and physical functioning.- she is not showing any significant progress/or showing regression  towards this goal and reassessment and adjustment of goals/treatment have been made. 2. Improving sleep to 6-7 hours a night. Improve mood/ anxiety and depression symptoms such as crying spells, low energy, problems with concentration, motivation. - she is showing progression towards this treatment goal with the current regimen. 3. Reduction of reliance on opioid analgesia/more appropriate opioid use. - she is showing progression towards this treatment goal with the current regimen. 4. Ability to focus/concentrate at work and perform the duties required of her at work  Sit through a workday without lower extremity symptoms. Stand 30-60 minutes without lower extremity symptoms. Ability to lift up to 10-20 lbs. Ability to go up and down stairs.   Sit 30-60 minutes  Without having to stand up frequently. - she is maintaining/progressing towards her work related goals with the current regimen. Risks and benefits of the medications and other alternative treatments have been/were  discussed with the patient. Any questions on the  common side effects of these medications were also answered. She was advised against drinking alcohol with the narcotic pain medicines, advised against driving or handling machinery when  starting or adjusting the dose of medicines, feeling groggy or drowsy, or if having any cognitive issues related to the current medications. Sheis fully aware of the risk of overdose and death, if medicines are misused and not taken as prescribed. If she develops new symptoms or if the symptoms worsen, she was told to call the office. .  Thank you for allowing me to participate in the care of this patient.     Bernetta Boxer, MD    Cc: Dr.Dori Shannan Sin

## 2023-02-20 ENCOUNTER — OFFICE VISIT (OUTPATIENT)
Dept: PAIN MANAGEMENT | Age: 53
End: 2023-02-20

## 2023-02-20 VITALS
BODY MASS INDEX: 38.53 KG/M2 | DIASTOLIC BLOOD PRESSURE: 81 MMHG | SYSTOLIC BLOOD PRESSURE: 138 MMHG | HEART RATE: 86 BPM | WEIGHT: 246 LBS

## 2023-02-20 DIAGNOSIS — M51.36 DDD (DEGENERATIVE DISC DISEASE), LUMBAR: ICD-10-CM

## 2023-02-20 DIAGNOSIS — M47.816 FACET SYNDROME, LUMBAR: ICD-10-CM

## 2023-02-20 DIAGNOSIS — M16.11 PRIMARY OSTEOARTHRITIS OF RIGHT HIP: ICD-10-CM

## 2023-02-20 DIAGNOSIS — M79.18 MYOFASCIAL PAIN: ICD-10-CM

## 2023-02-20 DIAGNOSIS — M79.7 FIBROMYALGIA: ICD-10-CM

## 2023-02-20 DIAGNOSIS — G89.4 CHRONIC PAIN SYNDROME: ICD-10-CM

## 2023-02-20 DIAGNOSIS — F39 MOOD DISORDER (HCC): ICD-10-CM

## 2023-02-20 DIAGNOSIS — M54.16 LUMBAR RADICULOPATHY: ICD-10-CM

## 2023-02-20 DIAGNOSIS — M17.11 PRIMARY OSTEOARTHRITIS OF RIGHT KNEE: ICD-10-CM

## 2023-02-20 DIAGNOSIS — M70.62 TROCHANTERIC BURSITIS OF BOTH HIPS: ICD-10-CM

## 2023-02-20 DIAGNOSIS — F51.01 PRIMARY INSOMNIA: ICD-10-CM

## 2023-02-20 DIAGNOSIS — R10.84 GENERALIZED ABDOMINAL PAIN: ICD-10-CM

## 2023-02-20 DIAGNOSIS — M70.61 TROCHANTERIC BURSITIS OF BOTH HIPS: ICD-10-CM

## 2023-02-20 DIAGNOSIS — M96.1 FAILED BACK SURGICAL SYNDROME: ICD-10-CM

## 2023-02-20 RX ORDER — HYDROCODONE BITARTRATE AND ACETAMINOPHEN 7.5; 325 MG/1; MG/1
1 TABLET ORAL 3 TIMES DAILY PRN
Qty: 105 TABLET | Refills: 0 | Status: SHIPPED | OUTPATIENT
Start: 2023-02-20 | End: 2023-03-27

## 2023-02-20 RX ORDER — TRIAMCINOLONE ACETONIDE 40 MG/ML
40 INJECTION, SUSPENSION INTRA-ARTICULAR; INTRAMUSCULAR ONCE
Status: SHIPPED | OUTPATIENT
Start: 2023-02-20

## 2023-02-20 RX ORDER — NORTRIPTYLINE HYDROCHLORIDE 25 MG/1
25-50 CAPSULE ORAL NIGHTLY
Qty: 60 CAPSULE | Refills: 1 | Status: SHIPPED | OUTPATIENT
Start: 2023-02-20

## 2023-02-20 RX ORDER — DULOXETIN HYDROCHLORIDE 60 MG/1
60 CAPSULE, DELAYED RELEASE ORAL DAILY
Qty: 30 CAPSULE | Refills: 1 | Status: SHIPPED | OUTPATIENT
Start: 2023-02-20

## 2023-02-20 RX ORDER — PREGABALIN 150 MG/1
150 CAPSULE ORAL 3 TIMES DAILY
Qty: 90 CAPSULE | Refills: 1 | Status: SHIPPED | OUTPATIENT
Start: 2023-02-20 | End: 2023-03-22

## 2023-02-20 RX ORDER — CELECOXIB 200 MG/1
200 CAPSULE ORAL DAILY
Qty: 60 CAPSULE | Refills: 1 | Status: SHIPPED | OUTPATIENT
Start: 2023-02-20

## 2023-02-20 NOTE — PROGRESS NOTES
[facility]  1970  [id number]    HISTORY OF PRESENT ILLNESS:  Ms. Cierra Navarro is a 45 y.o. female returns for a follow up visit for multiple medical problems. Her  presenting problems are   1. Chronic pain syndrome    2. Lumbar radiculopathy    3. Failed back surgical syndrome    4. Fibromyalgia    5. Primary osteoarthritis of right hip    6. Myofascial pain    7. Facet syndrome, lumbar    8. DDD (degenerative disc disease), lumbar    9. Trochanteric bursitis of both hips    10. Generalized abdominal pain    11. Primary insomnia    12. Mood disorder ([facility] [address]    13. Primary osteoarthritis of right knee    . As per information/history obtained from the PADT(patient assessment and documentation tool) -  She complains of pain in the lower back and knees Bilateral with radiation to the buttocks, hips Bilateral, and upper leg Bilateral She rates the pain 6/10 and describes it as aching, burning. Pain is made worse by: movement. Current treatment regimen has helped relieve about 40% of the pain. She denies side effects from the current pain regimen. Patient reports that since the last follow up visit the physical functioning is unchanged, family/social relationships are unchanged, mood is unchanged and sleep patterns are unchanged, and that the overall functioning is unchanged. Patient denies neurological bowel or bladder. Patient denies misusing/abusing her narcotic pain medications or using any illegal drugs. There are No indicators for possible drug abuse, addiction or diversion problems. Upon obtaining the medical history from Ms. Cierra Navarro regarding today's office visit for her presenting problems, patient complains she is having pain in the right knee on the inside. She states her symptoms have gotten worse. 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 She mentions status post Xrays about 5 years ago, show DJD. She reports she is using Norco 3 per day along with Celebrex.  Patient's subjective report of her mood is fair. she describes occasional symptoms of depression, occasional  irritability and some mood swings. Describes her mood as being neutral and reports some pleasure in her daily activities. Reports  fair  appetite, energy and concentration. Able to function well in different aspects of her daily activities. Denies suicidal or homicidal ideation. Denies any complaints of increased tension, does   Worry sometimes and occasional  irritability  she denies any c/o increased anxiety, No c/o panic attacks or symptoms of PTSD. She says she is on Cymbalta 60 mg. Patient states her sleep is fair. Has fairly normal sleep latency. Averages about 4-6 hours of sleep a night. Denies any signs of sleep apnea. Feels somewhat rested in the morning. She states she is on Pamelor 25 mg. She reports she has gained about 10 lbs in 3-4 months. ALLERGIES/PAST MED/FAM/SOC HISTORY: Ms. Cierra Navarro allergies, past medical, family and social history were reviewed in the chart. Ms. Cierra Navarro current medications are   Outpatient Medications Prior to Visit   Medication Sig Dispense Refill    albuterol sulfate HFA (PROVENTIL;VENTOLIN;PROAIR) 108 (90 Base) MCG/ACT inhaler       Lidocaine (ZTLIDO) 1.8 % PTCH Apply 1 patch every 12 hours then off 12 hours 60 patch 1    estradiol (ESTRACE) 0.1 MG/GM vaginal cream Place 0.5 g vaginally      fluticasone (FLONASE) 50 MCG/ACT nasal spray 2 sprays by Nasal route daily      DULoxetine (CYMBALTA) 60 MG extended release capsule Take 1 capsule by mouth daily 30 capsule 1    nortriptyline (PAMELOR) 25 MG capsule Take 1-2 capsules by mouth nightly 60 capsule 1    celecoxib (CELEBREX) 200 MG capsule Take 1 capsule by mouth daily 60 capsule 1    pregabalin (LYRICA) 150 MG capsule Take 1 capsule by mouth three times daily for 30 days. 90 capsule 1     No facility-administered medications prior to visit. REVIEW OF SYSTEMS: .   Respiratory: Negative for shortness of breath. Cardiovascular: Negative for chest pain, palpitations  Gastrointestinal: Negative for blood in stool, abdominal distention, nausea, vomiting, abdominal pain, diarrhea,constipation. Neurological: Negative for speech difficulty, weakness and light-headedness, dizziness, tremors, sleepiness  Psychiatric/Behavioral: Negative for suicidal ideas, hallucinations, behavioral problems, self-injury, decreased concentration/cognition, agitation, confusion. PHYSICAL EXAM:   Nursing note and vitals reviewed. /81   Pulse 86   Wt 246 lb (111.6 kg)   BMI 38.53 kg/m²   General Appearance: Patient is well nourished, well developed, well groomed and in no acute distress. Skin: Skin is warm and dry, good turgor . No rash or lesions noted. She is not diaphoretic. Pulmonary/Chest: Effort normal. No respiratory distress or use of accessory muscles. Auscultation revealing normal air entry. She does not have wheezes in the lung fields. She has no rales. Cardiovascular: Normal rate, regular rhythm, normal heart sounds, and does not have murmur. Exam reveals no gallop and no friction rub. Musculoskeletal / Extremities: Range of motion is normal. Gait is normal, assistive devices use: none. She exhibits edema: none, and no tenderness. Neurological/Psychiatric:She is alert and oriented to person, place, and time. Coordination is  normal.   Judgement and Insight is normal  Her mood is Appropriate and affect is Neutral/Euthymic(normal) . Her behavior is normal.   thought content normal.   Other: right knee + crepitation, good ROM        IMPRESSION:     1. Chronic pain syndrome    2. Lumbar radiculopathy    3. Failed back surgical syndrome    4. Fibromyalgia    5. Primary osteoarthritis of right hip    6. Myofascial pain    7. Facet syndrome, lumbar    8. DDD (degenerative disc disease), lumbar    9. Trochanteric bursitis of both hips    10. Generalized abdominal pain    11. Primary insomnia    12.  [facility]    13. Primary osteoarthritis of right knee        PLAN:  Informed verbal consent was obtained. -ROM/Stretching exercises as advised   -She was advised weight reduction, diet changes- 800-1200 maricarmen diet, diet diary, exercising, nutritional  consult increased physical activity as tolerated   Continue with Norco 3 per day   -Xray right knee reviewed   -Informed verbal consent was obtained from the patient. Risks and benefits of the procedure were explained. Complications of the procedure and side effects of kenalog/ lidocaine were discussed with patient. Using 0.25% marcaine and 1cc of kenalog=40mg the right knee was injected under aseptic conditions. Patient tolerated procedure well. Advised ice and rest.   -Continue with Lyrica 150 mg TID   -she was advised proper sleep hygiene-told to avoid:use of caffeine or other stimulants after noon, alcohol use near bedtime, long or frequent naps during the day, erratic sleep schedule, heavy meals near bedtime, vigorous exercise near bedtime and use of electronic devices near bedtime   -Continue with Pamelor   -CBT techniques- relaxation therapies such as biofeedback, mindfulness based stress reduction, imagery, cognitive restructuring, problem solving discussed with patient   -Continue with Lyrica 150 mg TID   Ms. Janusz Fowler will be prescribed  the medications  listed below which are for treatment of her presenting  medical problems which for this visit include:   Diagnoses of Chronic pain syndrome, Lumbar radiculopathy, Failed back surgical syndrome, Fibromyalgia, Primary osteoarthritis of right hip, Myofascial pain, Facet syndrome, lumbar, DDD (degenerative disc disease), lumbar, Trochanteric bursitis of both hips, Generalized abdominal pain, Primary insomnia, Mood disorder ([facility] [address] and Primary osteoarthritis of right knee were pertinent to this visit.   Medications/orders associated with this visit:    Current Outpatient Medications   Medication Sig Dispense Refill nortriptyline (PAMELOR) 25 MG capsule Take 1-2 capsules by mouth nightly 60 capsule 1    celecoxib (CELEBREX) 200 MG capsule Take 1 capsule by mouth daily 60 capsule 1    pregabalin (LYRICA) 150 MG capsule Take 1 capsule by mouth three times daily for 30 days. 90 capsule 1    DULoxetine (CYMBALTA) 60 MG extended release capsule Take 1 capsule by mouth daily 30 capsule 1    HYDROcodone-acetaminophen (NORCO) 7.5-325 MG per tablet Take 1 tablet by mouth 3 times daily as needed for Pain for up to 35 days. 105 tablet 0    albuterol sulfate HFA (PROVENTIL;VENTOLIN;PROAIR) 108 (90 Base) MCG/ACT inhaler       Lidocaine (ZTLIDO) 1.8 % PTCH Apply 1 patch every 12 hours then off 12 hours 60 patch 1    estradiol (ESTRACE) 0.1 MG/GM vaginal cream Place 0.5 g vaginally      fluticasone (FLONASE) 50 MCG/ACT nasal spray 2 sprays by Nasal route daily       Current Facility-Administered Medications   Medication Dose Route Frequency Provider Last Rate Last Admin    triamcinolone acetonide (KENALOG-40) injection 40 mg  40 mg Intra-artICUlar Once Ceci Jara MD            Goals of current treatment regimen include improvement in pain, restoration of functioning- with focus on improvement in physical performance, general activity, work or disability,emotional distress, health care utilization and  decreased medication consumption. Will continue to monitor progress towards achieving/maintaining therapeutic goals with special emphasis on  1. Improvement in perceived interfernce  of pain with ADL's. Ability to do home exercises independently. Ability to do household chores indoor and/or outdoor work and social and leisure activities. To increase flexibility/ROM, strength and endurance. Improve psychosocial and physical functioning.- she is showing progression towards this treatment goal with the current regimen. 2. Improving sleep to 6-7 hours a night.  Improve mood/ anxiety and depression symptoms such as crying spells, low energy, problems with concentration, motivation. - she is showing progression towards this treatment goal with the current regimen. 3. Reduction of reliance on opioid analgesia/more appropriate opioid use. - she is showing progression towards this treatment goal with the current regimen. Risks and benefits of the medications and other alternative treatments have been/were  discussed with the patient. Any questions on the  common side effects of these medications were also answered. She was advised against drinking alcohol with the narcotic pain medicines, advised against driving or handling machinery when  starting or adjusting the dose of medicines, feeling groggy or drowsy, or if having any cognitive issues related to the current medications. Sheis fully aware of the risk of overdose and death, if medicines are misused and not taken as prescribed. If she develops new symptoms or if the symptoms worsen, she was told to call the office. .  Thank you for allowing me to participate in the care of this patient.     Viki Goldsmith MD    Cc: Dr.Dori Christa Robles

## 2023-03-27 ENCOUNTER — OFFICE VISIT (OUTPATIENT)
Dept: PAIN MANAGEMENT | Age: 53
End: 2023-03-27
Payer: COMMERCIAL

## 2023-03-27 VITALS
DIASTOLIC BLOOD PRESSURE: 91 MMHG | SYSTOLIC BLOOD PRESSURE: 128 MMHG | HEART RATE: 84 BPM | OXYGEN SATURATION: 95 % | BODY MASS INDEX: 37.15 KG/M2 | WEIGHT: 237.2 LBS

## 2023-03-27 DIAGNOSIS — M16.11 PRIMARY OSTEOARTHRITIS OF RIGHT HIP: ICD-10-CM

## 2023-03-27 DIAGNOSIS — M79.7 FIBROMYALGIA: ICD-10-CM

## 2023-03-27 DIAGNOSIS — M70.62 TROCHANTERIC BURSITIS OF BOTH HIPS: ICD-10-CM

## 2023-03-27 DIAGNOSIS — M96.1 FAILED BACK SURGICAL SYNDROME: ICD-10-CM

## 2023-03-27 DIAGNOSIS — R10.84 GENERALIZED ABDOMINAL PAIN: ICD-10-CM

## 2023-03-27 DIAGNOSIS — G89.4 CHRONIC PAIN SYNDROME: ICD-10-CM

## 2023-03-27 DIAGNOSIS — M54.16 LUMBAR RADICULOPATHY: ICD-10-CM

## 2023-03-27 DIAGNOSIS — M51.36 DDD (DEGENERATIVE DISC DISEASE), LUMBAR: ICD-10-CM

## 2023-03-27 DIAGNOSIS — M70.61 TROCHANTERIC BURSITIS OF BOTH HIPS: ICD-10-CM

## 2023-03-27 DIAGNOSIS — M47.816 FACET SYNDROME, LUMBAR: ICD-10-CM

## 2023-03-27 PROCEDURE — 99213 OFFICE O/P EST LOW 20 MIN: CPT | Performed by: INTERNAL MEDICINE

## 2023-03-27 RX ORDER — DULOXETIN HYDROCHLORIDE 60 MG/1
60 CAPSULE, DELAYED RELEASE ORAL DAILY
Qty: 30 CAPSULE | Refills: 1 | Status: SHIPPED | OUTPATIENT
Start: 2023-03-27

## 2023-03-27 RX ORDER — HYDROCODONE BITARTRATE AND ACETAMINOPHEN 7.5; 325 MG/1; MG/1
1 TABLET ORAL 3 TIMES DAILY PRN
Qty: 105 TABLET | Refills: 0 | Status: SHIPPED | OUTPATIENT
Start: 2023-03-27 | End: 2023-05-01

## 2023-03-27 NOTE — PROGRESS NOTES
Generalized abdominal pain, Lumbar radiculopathy, Primary osteoarthritis of right hip, DDD (degenerative disc disease), lumbar, Primary insomnia, Failed back surgical syndrome, Myofascial pain, Trochanteric bursitis of both hips, and Mood disorder ([address] were pertinent to this visit. Risks and benefits of the medications and other alternative treatments  including no treatment were discussed with the patient. The common side effects of these medications were also explained to the patient. Informed verbal consent was obtained. Goals of current treatment regimen include improvement in pain, restoration of functioning- with focus on improvement in physical performance, general activity, work or disability,emotional distress, health care utilization and  decreased medication consumption. Will continue to monitor progress towards achieving/maintaining therapeutic goals with special emphasis on  1. Improvement in perceived interfernce  of pain with ADL's. Ability to do home exercises independently. Ability to do household chores indoor and/or outdoor work and social and leisure activities. Improve psychosocial and physical functioning.- she is showing progression towards this treatment goal with the current regimen. She was advised against drinking alcohol with the narcotic pain medicines, advised against driving or handling machinery while adjusting the dose of medicines or if having cognitive  issues related to the current medications. Risk of overdose and death, if medicines not taken as prescribed, were also discussed. If the patient develops new symptoms or if the symptoms worsen, the patient should call the office. While transcribing every attempt was made to maintain the accuracy of the note in terms of it's contents,there may have been some errors made inadvertently. Thank you for allowing me to participate in the care of this patient.     Sravani Mantilla MD.    Cc: Dr.Dori Luis Enrique Holbrook

## 2023-04-22 DIAGNOSIS — F51.01 PRIMARY INSOMNIA: ICD-10-CM

## 2023-04-22 DIAGNOSIS — G89.4 CHRONIC PAIN SYNDROME: ICD-10-CM

## 2023-04-24 RX ORDER — NORTRIPTYLINE HYDROCHLORIDE 25 MG/1
CAPSULE ORAL
Qty: 60 CAPSULE | Refills: 1 | OUTPATIENT
Start: 2023-04-24

## 2023-05-01 ENCOUNTER — OFFICE VISIT (OUTPATIENT)
Dept: PAIN MANAGEMENT | Age: 53
End: 2023-05-01

## 2023-05-01 VITALS
SYSTOLIC BLOOD PRESSURE: 138 MMHG | BODY MASS INDEX: 37.43 KG/M2 | DIASTOLIC BLOOD PRESSURE: 87 MMHG | HEART RATE: 89 BPM | WEIGHT: 239 LBS

## 2023-05-01 DIAGNOSIS — M96.1 FAILED BACK SURGICAL SYNDROME: ICD-10-CM

## 2023-05-01 DIAGNOSIS — R10.84 GENERALIZED ABDOMINAL PAIN: ICD-10-CM

## 2023-05-01 DIAGNOSIS — F39 MOOD DISORDER (HCC): ICD-10-CM

## 2023-05-01 DIAGNOSIS — G89.4 CHRONIC PAIN SYNDROME: ICD-10-CM

## 2023-05-01 DIAGNOSIS — M16.11 PRIMARY OSTEOARTHRITIS OF RIGHT HIP: ICD-10-CM

## 2023-05-01 DIAGNOSIS — M51.36 DDD (DEGENERATIVE DISC DISEASE), LUMBAR: ICD-10-CM

## 2023-05-01 DIAGNOSIS — M70.61 TROCHANTERIC BURSITIS OF BOTH HIPS: ICD-10-CM

## 2023-05-01 DIAGNOSIS — M70.62 TROCHANTERIC BURSITIS OF BOTH HIPS: ICD-10-CM

## 2023-05-01 DIAGNOSIS — M47.816 FACET SYNDROME, LUMBAR: ICD-10-CM

## 2023-05-01 DIAGNOSIS — M79.18 MYOFASCIAL PAIN: ICD-10-CM

## 2023-05-01 DIAGNOSIS — M79.7 FIBROMYALGIA: ICD-10-CM

## 2023-05-01 DIAGNOSIS — M17.11 PRIMARY OSTEOARTHRITIS OF RIGHT KNEE: ICD-10-CM

## 2023-05-01 DIAGNOSIS — F51.01 PRIMARY INSOMNIA: ICD-10-CM

## 2023-05-01 DIAGNOSIS — M54.16 LUMBAR RADICULOPATHY: ICD-10-CM

## 2023-05-01 RX ORDER — DULOXETIN HYDROCHLORIDE 60 MG/1
60 CAPSULE, DELAYED RELEASE ORAL DAILY
Qty: 30 CAPSULE | Refills: 1 | Status: SHIPPED | OUTPATIENT
Start: 2023-05-01

## 2023-05-01 RX ORDER — LIDOCAINE 36 MG/1
PATCH TOPICAL
Qty: 60 PATCH | Refills: 1 | Status: SHIPPED | OUTPATIENT
Start: 2023-05-01

## 2023-05-01 RX ORDER — PREGABALIN 150 MG/1
150 CAPSULE ORAL 3 TIMES DAILY
Qty: 90 CAPSULE | Refills: 1 | Status: SHIPPED | OUTPATIENT
Start: 2023-05-01 | End: 2023-05-31

## 2023-05-01 RX ORDER — CELECOXIB 200 MG/1
200 CAPSULE ORAL DAILY
Qty: 60 CAPSULE | Refills: 1 | Status: SHIPPED | OUTPATIENT
Start: 2023-05-01

## 2023-05-01 RX ORDER — NORTRIPTYLINE HYDROCHLORIDE 25 MG/1
25-50 CAPSULE ORAL NIGHTLY
Qty: 60 CAPSULE | Refills: 1 | Status: SHIPPED | OUTPATIENT
Start: 2023-05-01

## 2023-05-01 RX ORDER — HYDROCODONE BITARTRATE AND ACETAMINOPHEN 7.5; 325 MG/1; MG/1
1 TABLET ORAL 3 TIMES DAILY PRN
Qty: 105 TABLET | Refills: 0 | Status: SHIPPED | OUTPATIENT
Start: 2023-05-01 | End: 2023-06-05

## 2023-05-01 NOTE — PROGRESS NOTES
Estee French  1970  [id number]      HISTORY OF PRESENT ILLNESS:  Ms. Lavinia Hobbs is a 45 y.o. female returns for a follow up visit for pain management  She has a diagnosis of   1. Chronic pain syndrome    2. Primary insomnia    3. Fibromyalgia    4. Lumbar radiculopathy    5. Generalized abdominal pain    6. Facet syndrome, lumbar    7. Primary osteoarthritis of right hip    8. DDD (degenerative disc disease), lumbar    9. Failed back surgical syndrome    10. Myofascial pain    11. Trochanteric bursitis of both hips    12. Primary osteoarthritis of right knee    13. Mood disorder ([facility]    . She complains of pain in the  Low back, Bilateral legs, bilateral knees   She rates the pain 8/10 and describes it as aching. Current treatment regimen has helped relieve about 30% of the pain. She denies any side effects from the current pain regimen. Patient reports that since the last follow up visit the physical functioning is unchanged, family/social relationships are unchanged, mood is unchanged sleep patterns are unchanged, and that the overall functioning is unchanged. Patient denies misusing/abusing her narcotic pain medications or using any illegal drugs. There are No indicators for possible drug abuse, addiction or diversion problems. Upon obtaining the medical history from Ms. Lavinia Hobbs regarding today's office visit for her presenting problems,Ms. Lavinia Hobbs complains her whol lower back, left leg and right knee has been hurting. She states it hurts to work, still driving. She mentions she has pain on walking also. She says she is using Celebrex as needed. She reports she is doing her stretching exercises, but walking very little due to pain in the knee. She states states post I/A injection helped some with the pain. ALLERGIES/PAST MED/FAM/SOC HISTORY: Ms. Lavinia Hobbs allergies, past medical, family and social history were reviewed in the chart.       Ms. Lavinia Hobbs current medications are   Outpatient Medications

## 2023-05-11 ENCOUNTER — HOSPITAL ENCOUNTER (OUTPATIENT)
Dept: MRI IMAGING | Age: 53
Discharge: HOME | End: 2023-05-11
Payer: COMMERCIAL

## 2023-05-11 DIAGNOSIS — G89.4 CHRONIC PAIN SYNDROME: ICD-10-CM

## 2023-05-11 DIAGNOSIS — M17.11 PRIMARY OSTEOARTHRITIS OF RIGHT KNEE: ICD-10-CM

## 2023-05-11 PROCEDURE — 73721 MRI JNT OF LWR EXTRE W/O DYE: CPT

## 2023-05-17 SDOH — HEALTH STABILITY: PHYSICAL HEALTH: ON AVERAGE, HOW MANY DAYS PER WEEK DO YOU ENGAGE IN MODERATE TO STRENUOUS EXERCISE (LIKE A BRISK WALK)?: 0 DAYS

## 2023-05-18 ENCOUNTER — OFFICE VISIT (OUTPATIENT)
Dept: ORTHOPEDIC SURGERY | Age: 53
End: 2023-05-18

## 2023-05-18 VITALS — HEIGHT: 67 IN | WEIGHT: 239 LBS | BODY MASS INDEX: 37.51 KG/M2

## 2023-05-18 DIAGNOSIS — M25.561 ACUTE PAIN OF RIGHT KNEE: ICD-10-CM

## 2023-05-18 DIAGNOSIS — S83.241A OTHER TEAR OF MEDIAL MENISCUS OF RIGHT KNEE AS CURRENT INJURY, INITIAL ENCOUNTER: Primary | ICD-10-CM

## 2023-05-18 NOTE — PROGRESS NOTES
Chief Complaint    Knee Pain (Right knee)      History of Present Illness:  Shanda Fuchs is a 45 y.o. female who presents for right knee pain. Patient states back in December she was walking on some large rocks when she had a twisting incident to her right knee. Since then she has had medial sided knee pain with weakness and instability. She presented to her pain management physician who performed a right knee cortisone injection. She states this improved her symptoms for 1 week. Since then she has had dull, achy knee pain worse with ambulating for long distances that becomes sharp \"clicking\" in nature when transitioning from sitting to standing. She has been using ice and bracing without symptom relief the patient denies any numbness, paresthesias, or weakness. 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 Her knee pain is affecting her ability to work at this point. Pain Assessment  Location of Pain: Knee  Location Modifiers: Right  Severity of Pain: 9  Quality of Pain: Sharp, Aching, Other (Comment) (unstable)  Duration of Pain: Persistent  Frequency of Pain: Constant  Aggravating Factors: Walking, Stairs  Limiting Behavior: Yes  Relieving Factors: Rest  Result of Injury: Yes  Work-Related Injury: No    Past Medical History:   Diagnosis Date    Colitis         Past Surgical History:   Procedure Laterality Date    FOOT SURGERY      URETHRA SURGERY         No family history on file.     Social History     Socioeconomic History    Marital status:      Spouse name: None    Number of children: None    Years of education: None    Highest education level: None   Tobacco Use    Smoking status: Never    Smokeless tobacco: Never   Substance and Sexual Activity    Alcohol use: No    Drug use: No     Social Determinants of Health     Physical Activity: Unknown    Days of Exercise per Week: 0 days   Intimate Partner Violence: Not At Risk    Fear of Current or

## 2023-05-19 ENCOUNTER — TELEPHONE (OUTPATIENT)
Dept: ORTHOPEDIC SURGERY | Age: 53
End: 2023-05-19

## 2023-05-19 DIAGNOSIS — Z01.818 PREOPERATIVE CLEARANCE: Primary | ICD-10-CM

## 2023-05-19 DIAGNOSIS — S83.241A OTHER TEAR OF MEDIAL MENISCUS OF RIGHT KNEE AS CURRENT INJURY, INITIAL ENCOUNTER: ICD-10-CM

## 2023-05-19 NOTE — TELEPHONE ENCOUNTER
Surgery and/or Procedure Scheduling     Contact Name: Wendy Edwardaparna Request:   Patient Contact Number: [phone]      THE PT IS RETURNING GUS'S PHONE CALL TO SCHEDULE HER SX. PLEASE CALL HER BACK.

## 2023-05-25 ENCOUNTER — TELEPHONE (OUTPATIENT)
Dept: ORTHOPEDIC SURGERY | Age: 53
End: 2023-05-25

## 2023-05-25 NOTE — TELEPHONE ENCOUNTER
CPT: 06941  AUTH: [id number]  DATE RANGE: 6/13/23 TO 8/11/23  INSURANCE: BCBS  LOCATION [facility]  AREA OF SX RT KNEE  NOTE: DOC SCANNED

## 2023-05-30 ENCOUNTER — TELEPHONE (OUTPATIENT)
Dept: PAIN MANAGEMENT | Age: 53
End: 2023-05-30

## 2023-05-30 ENCOUNTER — TELEPHONE (OUTPATIENT)
Dept: ORTHOPEDIC SURGERY | Age: 53
End: 2023-05-30

## 2023-05-30 NOTE — TELEPHONE ENCOUNTER
LVM for patient letting her know that we need to get her fit with a t-scope and crutches prior to surgery. I asked for a callback at her convenience so that we can get that scheduled. [phone]. No

## 2023-06-05 ENCOUNTER — TELEPHONE (OUTPATIENT)
Dept: PAIN MANAGEMENT | Age: 53
End: 2023-06-05

## 2023-06-05 ENCOUNTER — OFFICE VISIT (OUTPATIENT)
Dept: PAIN MANAGEMENT | Age: 53
End: 2023-06-05

## 2023-06-05 VITALS
BODY MASS INDEX: 37.12 KG/M2 | DIASTOLIC BLOOD PRESSURE: 91 MMHG | HEART RATE: 103 BPM | WEIGHT: 237 LBS | SYSTOLIC BLOOD PRESSURE: 147 MMHG

## 2023-06-05 DIAGNOSIS — R10.84 GENERALIZED ABDOMINAL PAIN: ICD-10-CM

## 2023-06-05 DIAGNOSIS — M54.16 LUMBAR RADICULOPATHY: ICD-10-CM

## 2023-06-05 DIAGNOSIS — M79.7 FIBROMYALGIA: ICD-10-CM

## 2023-06-05 DIAGNOSIS — G89.4 CHRONIC PAIN SYNDROME: ICD-10-CM

## 2023-06-05 DIAGNOSIS — M51.36 DDD (DEGENERATIVE DISC DISEASE), LUMBAR: ICD-10-CM

## 2023-06-05 DIAGNOSIS — M70.62 TROCHANTERIC BURSITIS OF BOTH HIPS: ICD-10-CM

## 2023-06-05 DIAGNOSIS — M47.816 FACET SYNDROME, LUMBAR: ICD-10-CM

## 2023-06-05 DIAGNOSIS — M79.18 MYOFASCIAL PAIN: ICD-10-CM

## 2023-06-05 DIAGNOSIS — M96.1 FAILED BACK SURGICAL SYNDROME: ICD-10-CM

## 2023-06-05 DIAGNOSIS — M70.61 TROCHANTERIC BURSITIS OF BOTH HIPS: ICD-10-CM

## 2023-06-05 RX ORDER — NORTRIPTYLINE HYDROCHLORIDE 25 MG/1
25-50 CAPSULE ORAL NIGHTLY
Qty: 60 CAPSULE | Refills: 1 | Status: SHIPPED | OUTPATIENT
Start: 2023-06-05

## 2023-06-05 RX ORDER — HYDROCODONE BITARTRATE AND ACETAMINOPHEN 7.5; 325 MG/1; MG/1
1 TABLET ORAL 3 TIMES DAILY PRN
Qty: 105 TABLET | Refills: 0 | Status: SHIPPED | OUTPATIENT
Start: 2023-06-05 | End: 2023-06-12

## 2023-06-05 RX ORDER — CELECOXIB 200 MG/1
200 CAPSULE ORAL DAILY
Qty: 60 CAPSULE | Refills: 1 | Status: SHIPPED | OUTPATIENT
Start: 2023-06-05

## 2023-06-05 RX ORDER — PREGABALIN 150 MG/1
150 CAPSULE ORAL 3 TIMES DAILY
Qty: 90 CAPSULE | Refills: 1 | Status: SHIPPED | OUTPATIENT
Start: 2023-06-05 | End: 2023-07-05

## 2023-06-05 RX ORDER — LIDOCAINE 36 MG/1
PATCH TOPICAL
Qty: 60 PATCH | Refills: 1 | Status: SHIPPED | OUTPATIENT
Start: 2023-06-05

## 2023-06-05 RX ORDER — DULOXETIN HYDROCHLORIDE 60 MG/1
60 CAPSULE, DELAYED RELEASE ORAL DAILY
Qty: 30 CAPSULE | Refills: 1 | Status: SHIPPED | OUTPATIENT
Start: 2023-06-05

## 2023-06-05 RX ORDER — HYDROCODONE BITARTRATE AND ACETAMINOPHEN 7.5; 325 MG/1; MG/1
1 TABLET ORAL 3 TIMES DAILY PRN
Qty: 105 TABLET | Refills: 0 | Status: SHIPPED | OUTPATIENT
Start: 2023-06-05 | End: 2023-06-05 | Stop reason: SDUPTHER

## 2023-06-05 NOTE — TELEPHONE ENCOUNTER
Per RSM okay    Medication is canceled at [facility] pending to be sent to [facility] club

## 2023-06-05 NOTE — PROGRESS NOTES
Nawaf Oconnor  1970  [id number]      HISTORY OF PRESENT ILLNESS:  Ms. Nallely Satnos is a 45 y.o. female returns for a follow up visit for pain management  She has a diagnosis of   1. Chronic pain syndrome    2. Lumbar radiculopathy    3. Primary osteoarthritis of right hip    4. Myofascial pain    5. Trochanteric bursitis of both hips    6. DDD (degenerative disc disease), lumbar    7. Generalized abdominal pain    8. Fibromyalgia    9. Facet syndrome, lumbar    10. Failed back surgical syndrome    11. Primary insomnia      She complains of pain in the  low back ,bilateral knees   She rates the pain 4/10 and describes it as sharp, aching. Current treatment regimen has helped relieve about 60% of the pain. She denies any side effects from the current pain regimen. Patient reports that since the last follow up visit the physical functioning is unchanged, family/social relationships are unchanged, mood is unchanged sleep patterns are unchanged, and that the overall functioning is unchanged. Patient denies misusing/abusing her narcotic pain medications or using any illegal drugs. There are No indicators for possible drug abuse, addiction or diversion problems. Patient states she had a MRI of right knee, she has a tear of the meniscus, she is going for arthroscopic surgery. Ms. Nallely Santos states she is off work for the summer. Patient reports her weight has been stable. She says her back and leg pain is baseline. ALLERGIES: Patients list of allergies were reviewed     MEDICATIONS: Ms. Nallely Santos list of medications were reviewed. Her current medications are   Outpatient Medications Prior to Visit   Medication Sig Dispense Refill    DULoxetine (CYMBALTA) 60 MG extended release capsule Take 1 capsule by mouth daily 30 capsule 1    HYDROcodone-acetaminophen (NORCO) 7.5-325 MG per tablet Take 1 tablet by mouth 3 times daily as needed for Pain for up to 35 days.  105 tablet 0    nortriptyline (PAMELOR) 25 MG capsule Take 1-2

## 2023-06-05 NOTE — TELEPHONE ENCOUNTER
Patient said her preferred pharmacy doesn't have the [facility] in [address] [place] [address].  She would like it resent to [address], [facility]       Please advise

## 2023-06-12 ENCOUNTER — TELEPHONE (OUTPATIENT)
Dept: PAIN MANAGEMENT | Age: 53
End: 2023-06-12

## 2023-06-12 NOTE — TELEPHONE ENCOUNTER
Submitted PA for Pregabalin  Via CMM Key: [id number] STATUS: \"Available without authorization. \"    If this requires a response please respond to the pool ( P MHCX [address]). Thank you!

## 2023-06-20 ENCOUNTER — HOSPITAL ENCOUNTER (OUTPATIENT)
Dept: PHYSICAL THERAPY | Age: 53
Setting detail: THERAPIES SERIES
Discharge: HOME | End: 2023-06-20
Payer: COMMERCIAL

## 2023-06-20 PROCEDURE — 97110 THERAPEUTIC EXERCISES: CPT | Performed by: PHYSICAL THERAPIST

## 2023-06-20 PROCEDURE — 97016 VASOPNEUMATIC DEVICE THERAPY: CPT | Performed by: PHYSICAL THERAPIST

## 2023-06-20 NOTE — FLOWSHEET NOTE
Strength L R Comment   Quad         Hamstring         Gastroc         Hip  flexion         Hip abd                                Deferred d/t pos-op status   Special Test Results/Comment   Meniscal Click     Crepitus     Flexion Test     Valgus Laxity     Varus Laxity     Lachmans     Drop Back     Homans                 Girth L R Post-Vaso   Mid Patella 17 18 17.5   Suprapatellar 18 19 18.5   5cm above 19.5 22 21   15cm above     N/A   Vasopneumatic compression applied to knee for significant edema, swelling, pain control. ICD-10 code: R60.9 Edema unspecified. Reflexes/Sensation:               [x]Dermatomes/Myotomes intact               []Reflexes equal and normal bilaterally              []Other:     Joint mobility: Patellar mobility shows good movement bilaterally. R>L is more hypomobile              [x]Normal                      [x]Hypo              []Hyper     Palpation: Notable swelling surrounding R>L. Pain along patellar femoral tendon and along anterior thigh specifically surrounding the VMO. Functional Mobility/Transfers: Pt appears with minimal difficulties with transfers and mobility requiring some momentum based strategies to transition appropriately. Posture: Slouched and Kyphotic. Pt places emphasis on L>R. Pt able to tolerate bent knee position, knee extended, supine, seated, long sitting, and sidelying with minimal difficulties      Bandages/Dressings/Incisions: Incision looks drained and well maintained. Minimal discoloration. 2 sutures noted. Mild swelling documented specifically supra and 5 cm above. Gait: (include devices/WB status) Gait appears antalgic with emphasis on L>R, Shortened stride length on L.                            RESTRICTIONS/PRECAUTIONS: Progress as tolerated     Exercises/Interventions:   Exercise/Equipment Resistance/Repetitions Other comments   Stretching     Hamstring 3x30\" 3   Towel Pull 3x30\" 2   Inclined Calf     Hip Flexion     ITB     Groin

## 2023-06-22 DIAGNOSIS — S83.241D OTHER TEAR OF MEDIAL MENISCUS OF RIGHT KNEE AS CURRENT INJURY, SUBSEQUENT ENCOUNTER: ICD-10-CM

## 2023-06-22 RX ORDER — ASPIRIN 81 MG/1
TABLET, COATED ORAL
Qty: 28 TABLET | Refills: 0 | OUTPATIENT
Start: 2023-06-22

## 2023-06-23 ENCOUNTER — HOSPITAL ENCOUNTER (OUTPATIENT)
Dept: PHYSICAL THERAPY | Age: 53
Setting detail: THERAPIES SERIES
Discharge: HOME | End: 2023-06-23
Payer: COMMERCIAL

## 2023-06-23 PROCEDURE — 97110 THERAPEUTIC EXERCISES: CPT | Performed by: PHYSICAL THERAPIST

## 2023-06-23 NOTE — FLOWSHEET NOTE
Discharge      Electronically signed by:  Laureano Love, PT, DPT  Therapist was present, directed the patient's care, made skilled judgement, and was responsible for assessment and treatment of the patient. Ara Max, SPT    Note: If patient does not return for scheduled/ recommended follow up visits, this note will serve as a discharge from care along with most recent update on progress.

## 2023-06-27 ENCOUNTER — HOSPITAL ENCOUNTER (OUTPATIENT)
Dept: PHYSICAL THERAPY | Age: 53
Setting detail: THERAPIES SERIES
Discharge: HOME | End: 2023-06-27
Payer: COMMERCIAL

## 2023-06-27 PROCEDURE — 97016 VASOPNEUMATIC DEVICE THERAPY: CPT | Performed by: PHYSICAL THERAPIST

## 2023-06-27 PROCEDURE — 97110 THERAPEUTIC EXERCISES: CPT | Performed by: PHYSICAL THERAPIST

## 2023-06-28 ENCOUNTER — OFFICE VISIT (OUTPATIENT)
Dept: ORTHOPEDIC SURGERY | Age: 53
End: 2023-06-28

## 2023-06-28 VITALS — WEIGHT: 235 LBS | HEIGHT: 67 IN | BODY MASS INDEX: 36.88 KG/M2

## 2023-06-28 DIAGNOSIS — Z98.890 STATUS POST ARTHROSCOPY OF RIGHT KNEE: Primary | ICD-10-CM

## 2023-06-28 DIAGNOSIS — S83.241D OTHER TEAR OF MEDIAL MENISCUS OF RIGHT KNEE AS CURRENT INJURY, SUBSEQUENT ENCOUNTER: ICD-10-CM

## 2023-06-30 ENCOUNTER — HOSPITAL ENCOUNTER (OUTPATIENT)
Dept: PHYSICAL THERAPY | Age: 53
Setting detail: THERAPIES SERIES
Discharge: HOME | End: 2023-06-30
Payer: COMMERCIAL

## 2023-06-30 PROCEDURE — 97110 THERAPEUTIC EXERCISES: CPT | Performed by: SPECIALIST/TECHNOLOGIST

## 2023-06-30 PROCEDURE — 97016 VASOPNEUMATIC DEVICE THERAPY: CPT | Performed by: SPECIALIST/TECHNOLOGIST

## 2023-07-05 ENCOUNTER — HOSPITAL ENCOUNTER (OUTPATIENT)
Dept: PHYSICAL THERAPY | Age: 53
Setting detail: THERAPIES SERIES
Discharge: HOME | End: 2023-07-05
Payer: COMMERCIAL

## 2023-07-05 NOTE — FLOWSHEET NOTE
[facility] and [address] [place] [address] [address],  [facility], [address] [place] [address], [address] [place] [address] Avenue  Phone: [phone]  Fax: [phone]      Physical Therapy  Cancellation/No-show Note  Patient Name:  Mario Galan  :  1970   Date:  2023  Cancelled visits to date: 1  No-shows to date: 0    For today's appointment patient:  [x]  Cancelled  []  Rescheduled appointment  []  No-show     Reason given by patient:  []  Patient ill  []  Conflicting appointment  []  No transportation    []  Conflict with work  [x]  No reason given  []  Other:     Comments:      Electronically signed by:  Colt Cool, [address]

## 2023-07-07 ENCOUNTER — HOSPITAL ENCOUNTER (OUTPATIENT)
Dept: PHYSICAL THERAPY | Age: 53
Setting detail: THERAPIES SERIES
Discharge: HOME | End: 2023-07-07
Payer: COMMERCIAL

## 2023-07-07 PROCEDURE — 97016 VASOPNEUMATIC DEVICE THERAPY: CPT | Performed by: PHYSICAL THERAPIST

## 2023-07-07 PROCEDURE — 97110 THERAPEUTIC EXERCISES: CPT | Performed by: PHYSICAL THERAPIST

## 2023-07-07 PROCEDURE — G0283 ELEC STIM OTHER THAN WOUND: HCPCS | Performed by: PHYSICAL THERAPIST

## 2023-07-07 NOTE — FLOWSHEET NOTE
[facility] and [address] [place] [address] [address],  [facility], [address] [place] [address] [address]  [address], [address] [place] [address] Avenue  Phone: [phone]  Fax: [phone]      Physical Therapy Daily Treatment Note  Date:  2023    Patient Name:  Trista Huang    :  1970  MRN: [id number]  Restrictions/Precautions:    Medical/Treatment Diagnosis Information:  Other tear of medial meniscus of right knee as current injury, initial encounter [S83.241A]  Treatment Diagnosis: Stiffness (R) M25.661, Effusion (R) M25.461, Pain (R) M25.561, Difficulty with Walking R26.2, Gait Abnormality (R26.9), Weakness R53.1)  S/P R Knee Scope   Insurance/Certification information:  PT Insurance Information: BCBS NO AUTH REQUIRED  Physician Information:  Travis Arthur MD    Has the plan of care been signed (Y/N):        [x]  Yes  []  No     Date of Patient follow up with Physician: 08/3/2023      Is this a Progress Report:     []  Yes  [x]  No        If Yes:  Date Range for reporting period:  Beginning 2023  Ending    Progress report will be due (10 Rx or 30 days whichever is less):       Recertification will be due (POC Duration  / 90 days whichever is less): 2023        Visit # Insurance Allowable Auth Required   6 30 []  Yes [x]  No          OUTCOME MEASURE DATE SCORE   LEFS 2023 49% deficit            Latex Allergy:  [x]NO      []YES  Preferred Language for Healthcare:   [x]English       []other:    Pain level:  10     SUBJECTIVE:  Patient reports feeling very sore and in pain surrounding her knee compared to last time. Pt reports that she has not been doing any of her HEP because of knee pain. Pt reports that Dr. Selam Corbett was pleased with her progress. Pt reports that she was disappointed that she would not be able to go into the pool until her wounds had stopped scabbing. Pt reports sleeping well as long as knee is in a good position.

## 2023-07-10 ENCOUNTER — TELEPHONE (OUTPATIENT)
Dept: PAIN MANAGEMENT | Age: 53
End: 2023-07-10

## 2023-07-10 ENCOUNTER — OFFICE VISIT (OUTPATIENT)
Dept: PAIN MANAGEMENT | Age: 53
End: 2023-07-10
Payer: COMMERCIAL

## 2023-07-10 VITALS
DIASTOLIC BLOOD PRESSURE: 89 MMHG | SYSTOLIC BLOOD PRESSURE: 139 MMHG | WEIGHT: 242 LBS | BODY MASS INDEX: 37.9 KG/M2 | HEART RATE: 100 BPM

## 2023-07-10 DIAGNOSIS — F39 MOOD DISORDER (HCC): ICD-10-CM

## 2023-07-10 DIAGNOSIS — M79.7 FIBROMYALGIA: ICD-10-CM

## 2023-07-10 DIAGNOSIS — F51.01 PRIMARY INSOMNIA: ICD-10-CM

## 2023-07-10 DIAGNOSIS — R10.84 GENERALIZED ABDOMINAL PAIN: ICD-10-CM

## 2023-07-10 DIAGNOSIS — M96.1 FAILED BACK SURGICAL SYNDROME: ICD-10-CM

## 2023-07-10 DIAGNOSIS — M47.816 FACET SYNDROME, LUMBAR: ICD-10-CM

## 2023-07-10 DIAGNOSIS — M70.61 TROCHANTERIC BURSITIS OF BOTH HIPS: ICD-10-CM

## 2023-07-10 DIAGNOSIS — G89.4 CHRONIC PAIN SYNDROME: ICD-10-CM

## 2023-07-10 DIAGNOSIS — M16.11 PRIMARY OSTEOARTHRITIS OF RIGHT HIP: ICD-10-CM

## 2023-07-10 DIAGNOSIS — M54.16 LUMBAR RADICULOPATHY: ICD-10-CM

## 2023-07-10 DIAGNOSIS — M17.11 PRIMARY OSTEOARTHRITIS OF RIGHT KNEE: ICD-10-CM

## 2023-07-10 DIAGNOSIS — M70.62 TROCHANTERIC BURSITIS OF BOTH HIPS: ICD-10-CM

## 2023-07-10 DIAGNOSIS — M51.36 DDD (DEGENERATIVE DISC DISEASE), LUMBAR: ICD-10-CM

## 2023-07-10 DIAGNOSIS — M79.18 MYOFASCIAL PAIN: ICD-10-CM

## 2023-07-10 PROCEDURE — 99214 OFFICE O/P EST MOD 30 MIN: CPT | Performed by: INTERNAL MEDICINE

## 2023-07-10 RX ORDER — PREGABALIN 150 MG/1
150 CAPSULE ORAL 2 TIMES DAILY
Qty: 60 CAPSULE | Refills: 1 | Status: SHIPPED | OUTPATIENT
Start: 2023-07-10 | End: 2023-09-08

## 2023-07-10 RX ORDER — CELECOXIB 200 MG/1
200 CAPSULE ORAL DAILY
Qty: 60 CAPSULE | Refills: 1 | Status: SHIPPED | OUTPATIENT
Start: 2023-07-10

## 2023-07-10 RX ORDER — DULOXETIN HYDROCHLORIDE 60 MG/1
60 CAPSULE, DELAYED RELEASE ORAL DAILY
Qty: 30 CAPSULE | Refills: 1 | Status: SHIPPED | OUTPATIENT
Start: 2023-07-10

## 2023-07-10 RX ORDER — HYDROCODONE BITARTRATE AND ACETAMINOPHEN 7.5; 325 MG/1; MG/1
1 TABLET ORAL 3 TIMES DAILY PRN
Qty: 105 TABLET | Refills: 0 | Status: SHIPPED | OUTPATIENT
Start: 2023-07-10 | End: 2023-08-14

## 2023-07-10 NOTE — PROGRESS NOTES
Jena Letters  1970  [id number]      HISTORY OF PRESENT ILLNESS:  Ms. Crow Young is a 45 y.o. female returns for a follow up visit for pain management  She has a diagnosis of   1. Chronic pain syndrome    2. Lumbar radiculopathy    3. Trochanteric bursitis of both hips    4. DDD (degenerative disc disease), lumbar    5. Fibromyalgia    6. Generalized abdominal pain    7. Facet syndrome, lumbar    8. Failed back surgical syndrome    9. Myofascial pain    . She complains of pain in the Low back, right knee  She rates the pain 5/10 and describes it as sharp, aching. Current treatment regimen has helped relieve about 60% of the pain. She denies any side effects from the current pain regimen. Patient reports that since the last follow up visit the physical functioning is worse, family/social relationships are unchanged, mood is unchanged sleep patterns are unchanged, and that the overall functioning is unchanged. Patient denies misusing/abusing her narcotic pain medications or using any illegal drugs. There are No indicators for possible drug abuse, addiction or diversion problems. Upon obtaining the medical history from Ms. Crow Young regarding today's office visit for her presenting problems, patient states she has been doing fair, had surgery on her right knee. She complains still hurts a lot. She says she started physical therapy. She mentions she is off work currently. She says she is using Celebrex along with Norco 3 per day. She reports she was given [place] post surgery. She states he is using Lyrica 150 mg BID. She says her weight has been slightly up. She mentions she has not been very active since surgery. Patient states her sleep is fair. Has fairly normal sleep latency. Averages about 4-6 hours of sleep a night. Denies any signs of sleep apnea. Feels somewhat rested in the morning. Patient's  subjective report of her mood is fair.  she describes occasional symptoms of depression, occasional

## 2023-07-10 NOTE — TELEPHONE ENCOUNTER
Pharmacy called and need clarification on the pregablin. They said the quantity is only for a 30 day but the script says for 60 days.      [facility] [id number] [place], [address] Shelly Rojas [phone]      Please advise

## 2023-07-11 ENCOUNTER — HOSPITAL ENCOUNTER (OUTPATIENT)
Dept: PHYSICAL THERAPY | Age: 53
Setting detail: THERAPIES SERIES
Discharge: HOME | End: 2023-07-11
Payer: COMMERCIAL

## 2023-07-11 PROCEDURE — 97016 VASOPNEUMATIC DEVICE THERAPY: CPT | Performed by: PHYSICAL THERAPIST

## 2023-07-11 PROCEDURE — 97110 THERAPEUTIC EXERCISES: CPT | Performed by: PHYSICAL THERAPIST

## 2023-07-11 PROCEDURE — 97530 THERAPEUTIC ACTIVITIES: CPT | Performed by: PHYSICAL THERAPIST

## 2023-07-11 PROCEDURE — G0283 ELEC STIM OTHER THAN WOUND: HCPCS | Performed by: PHYSICAL THERAPIST

## 2023-07-11 NOTE — FLOWSHEET NOTE
following IFC and vaso for 15 minutes. Pt would continue to benefit from skilled PT intervention to address these deficits and overall improve baseline strength to then progress towards improved functional movements. PLAN: See eval  [] Continue per plan of care [] Alter current plan (see comments above)  [x] Plan of care initiated [] Hold pending MD visit [] Discharge      Electronically signed by:  Brianna Desai, PT, DPT  Therapist was present, directed the patient's care, made skilled judgement, and was responsible for assessment and treatment of the patient. Candelario Liu, SPT    Note: If patient does not return for scheduled/ recommended follow up visits, this note will serve as a discharge from care along with most recent update on progress.

## 2023-07-14 ENCOUNTER — HOSPITAL ENCOUNTER (OUTPATIENT)
Dept: PHYSICAL THERAPY | Age: 53
Setting detail: THERAPIES SERIES
Discharge: HOME | End: 2023-07-14
Payer: COMMERCIAL

## 2023-07-18 ENCOUNTER — HOSPITAL ENCOUNTER (OUTPATIENT)
Dept: PHYSICAL THERAPY | Age: 53
Setting detail: THERAPIES SERIES
Discharge: HOME | End: 2023-07-18
Payer: COMMERCIAL

## 2023-07-18 ENCOUNTER — HOSPITAL ENCOUNTER (OUTPATIENT)
Dept: PHYSICAL THERAPY | Age: 53
Setting detail: THERAPIES SERIES
End: 2023-07-18
Payer: COMMERCIAL

## 2023-07-18 NOTE — FLOWSHEET NOTE
[facility] and [address] [place] [address] [address],  [facility], [address] [place] [address], [address] [place] [address] Avenue  Phone: [phone]  Fax: [phone]      Physical Therapy  Cancellation/No-show Note  Patient Name:  Larry Pinto  :  1970   Date:  2023  Cancelled visits to date: 2  No-shows to date: 0    For today's appointment patient:  [x]  Cancelled  []  Rescheduled appointment  []  No-show     Reason given by patient:  []  Patient ill  []  Conflicting appointment   []  No transportation    []  Conflict with work  [x]  No reason given  []  Other:     Comments:      Electronically signed by:  Juanis Marsh PT, DPT

## 2023-07-21 ENCOUNTER — APPOINTMENT (OUTPATIENT)
Dept: PHYSICAL THERAPY | Age: 53
End: 2023-07-21
Payer: COMMERCIAL

## 2023-07-25 ENCOUNTER — OFFICE VISIT (OUTPATIENT)
Dept: ORTHOPEDIC SURGERY | Age: 53
End: 2023-07-25

## 2023-07-25 ENCOUNTER — HOSPITAL ENCOUNTER (OUTPATIENT)
Dept: PHYSICAL THERAPY | Age: 53
Setting detail: THERAPIES SERIES
Discharge: HOME | End: 2023-07-25
Payer: COMMERCIAL

## 2023-07-25 VITALS — HEIGHT: 67 IN | BODY MASS INDEX: 37.67 KG/M2 | WEIGHT: 240 LBS

## 2023-07-25 DIAGNOSIS — Z98.890 STATUS POST ARTHROSCOPY OF RIGHT KNEE: Primary | ICD-10-CM

## 2023-07-25 DIAGNOSIS — M25.561 ACUTE PAIN OF RIGHT KNEE: ICD-10-CM

## 2023-07-25 PROCEDURE — 99024 POSTOP FOLLOW-UP VISIT: CPT

## 2023-07-25 NOTE — PROGRESS NOTES
History of Present Illness:  Heidi Adams is a pleasant 45 y.o. female who presents for a post operative visit. She is 6 weeks out following a right knee synovectomy, partial medial menisectomy and chondroplasty on 6/13/23. She was doing well after last visit until she fell off of  a 1 foot wall on 7/18/23. She landed flat footed, directly on her right leg. Since then she has had throbbing, medial sided knee pain with symptoms radiating up to her hip. She occasionally notes catching symptoms with going up steps. She has been taking hydrocodone, prescribed by Dr. Blas Thompson to help with symptom relief. She denies fevers, chills, numbness, tingling, and shortness of breath. Medical History:  Patient's medications, allergies, past medical, surgical, social and family histories were reviewed and updated as appropriate. No notes on file    Review of Systems  A 14 point review of systems was completed by the patient and is available in the media section of the scanned medical record and was reviewed on 7/25/2023. Vital Signs: There were no vitals filed for this visit. General/Appearance: Alert and oriented and in no apparent distress. Skin:  There are no skin lesions, cellulitis, or extreme edema. The patient has warm and well-perfused Bilateral lower  extremities with brisk capillary refill. Knee Exam: RIGHT    Inspection: Knee incision(s)  are clean, dry and intact and well approximated. The Mild ecchymosis and swelling are present as can be expected. There is no erythema, drainage or other signs of infection    Palpation:  No crepitus to gentle motion of the knee. No joint line or tibial plateau tenderness. Active Range of Motion: 0-145    Passive Range of Motion: 0-145 with end flexion pain. Strength:  Deferred    Special Tests:  Gentle Ronen's negative.      Neurovascular: Sensation to light touch is intact, no motor deficits, palpable radial pulses 2+    Radiology:     Dawn Shetty

## 2023-07-25 NOTE — FLOWSHEET NOTE
The [facility] and [address] [facility] [address],  [facility], [address] [place] [address]  [address]  Phone: [phone]  Fax: [phone]      Physical Therapy  Cancellation/No-show Note  Patient Name:  Jaime Munroe  :  1970   Date:  2023  Cancelled visits to date: 3  No-shows to date: 0    For today's appointment patient:  [x]  Cancelled  []  Rescheduled appointment  []  No-show     Reason given by patient:  []  Patient ill  []  Conflicting appointment   []  No transportation    []  Conflict with work  []  No reason given  [x]  Other:     Comments:  Davis Ragland 1 week ago, knee is very painful.      Electronically signed by:  Alexander Bowman, PT, DPT

## 2023-07-26 ENCOUNTER — TELEPHONE (OUTPATIENT)
Dept: ORTHOPEDIC SURGERY | Age: 53
End: 2023-07-26

## 2023-07-26 ENCOUNTER — TELEPHONE (OUTPATIENT)
Dept: PAIN MANAGEMENT | Age: 53
End: 2023-07-26

## 2023-07-26 DIAGNOSIS — M16.11 PRIMARY OSTEOARTHRITIS OF RIGHT HIP: ICD-10-CM

## 2023-07-26 DIAGNOSIS — Z98.890 STATUS POST ARTHROSCOPY OF RIGHT KNEE: Primary | ICD-10-CM

## 2023-07-26 DIAGNOSIS — M17.11 PRIMARY OSTEOARTHRITIS OF RIGHT KNEE: ICD-10-CM

## 2023-07-26 DIAGNOSIS — M79.7 FIBROMYALGIA: ICD-10-CM

## 2023-07-26 DIAGNOSIS — G89.4 CHRONIC PAIN SYNDROME: Primary | ICD-10-CM

## 2023-07-26 RX ORDER — METHYLPREDNISOLONE 4 MG/1
TABLET ORAL
Qty: 1 KIT | Refills: 0 | Status: SHIPPED | OUTPATIENT
Start: 2023-07-26

## 2023-07-26 RX ORDER — HYDROCODONE BITARTRATE AND ACETAMINOPHEN 5; 325 MG/1; MG/1
1 TABLET ORAL EVERY 4 HOURS PRN
Qty: 12 TABLET | Refills: 0 | Status: SHIPPED | OUTPATIENT
Start: 2023-07-26 | End: 2023-07-29

## 2023-07-26 NOTE — TELEPHONE ENCOUNTER
Called pharmacy; closed for lunch at this time; unable to leave message. yes, meds ok to be filled today. Approval below from previous telephone encounter        Spoke to MA at Dr. Cassie Jones office. They are providing medrol dose pack for patient. I will provide a one time RX for Norco, 5 mg. Left VM. Patient aware that it has been sent to pharmacy.       Cipriano Lesch, [place]

## 2023-07-26 NOTE — TELEPHONE ENCOUNTER
Pt said she got knee surgery back in , but recently re injured her knee which is causing excruciating pain. Pt orthopedic surgeon Dr. Gretchen Martinez , advised her to ask RSM to prescribe her more pain meds since she is having to take more meds every 8 hrs due to the pain.  Pt would like a call back       Dr. Gretchen Martinez: [phone]        Please advise

## 2023-07-26 NOTE — TELEPHONE ENCOUNTER
Per RSM, no RSM can not prescribe anymore pain medication but Dr. Thiago Hernandez can continue to take over her care or we can send in a Medrol pack  Called and spoke to the patient to advise her to call Navdeep Wright office back and let them know to continue to take over her care with the pain medication. Patient also stated she would like the Medrol pack also because that is better than nothing. I advised patient if Dr. Thiago Hernandez has any questions they can call our office. She voiced her understanding on everything and will give Dr. Thiago Hernandez office a call back, patient was also concerned about if she can still see RSM, I advised patient that yes she is still our patient with her chronic pain. Medrol pack was sent to [facility].
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
## 2023-07-26 NOTE — TELEPHONE ENCOUNTER
Aracelis prescribed hydrocodone/acetaminophen 5/325 mg. Patient does take the same med 7.5/325 mg from pain management. The pharmacy wanted to verify if this should be filled.       [facility]:  [phone]

## 2023-07-26 NOTE — TELEPHONE ENCOUNTER
Spoke to MA at Dr. Evangelist Mayer office. They are providing medrol dose pack for patient. I will provide a one time RX for Norco, 5 mg. Left VM. Patient aware that it has been sent to pharmacy.      [place], [place]

## 2023-07-28 ENCOUNTER — HOSPITAL ENCOUNTER (OUTPATIENT)
Dept: PHYSICAL THERAPY | Age: 53
Setting detail: THERAPIES SERIES
Discharge: HOME | End: 2023-07-28
Payer: COMMERCIAL

## 2023-07-28 NOTE — FLOWSHEET NOTE
[facility] and [address] [place] [address] [address],  [facility], [address] [place] [address], [address] [place] [address] Avenue  Phone: [phone]  Fax: [phone]      Physical Therapy  Cancellation/No-show Note  Patient Name:  Paresh Thomas  :  1970   Date:  2023  Cancelled visits to date: 4  No-shows to date: 0    For today's appointment patient:  [x]  Cancelled  []  Rescheduled appointment  []  No-show     Reason given by patient:  []  Patient ill  []  Conflicting appointment   []  No transportation    []  Conflict with work  [x]  No reason given  []  Other:     Comments:     Electronically signed by:  Luh Wright, PT, DPT

## 2023-08-14 ENCOUNTER — OFFICE VISIT (OUTPATIENT)
Dept: PAIN MANAGEMENT | Age: 53
End: 2023-08-14

## 2023-08-14 VITALS
WEIGHT: 239 LBS | BODY MASS INDEX: 37.43 KG/M2 | DIASTOLIC BLOOD PRESSURE: 85 MMHG | OXYGEN SATURATION: 94 % | SYSTOLIC BLOOD PRESSURE: 127 MMHG | HEART RATE: 89 BPM

## 2023-08-14 DIAGNOSIS — R10.84 GENERALIZED ABDOMINAL PAIN: ICD-10-CM

## 2023-08-14 DIAGNOSIS — G89.4 CHRONIC PAIN SYNDROME: ICD-10-CM

## 2023-08-14 DIAGNOSIS — F51.01 PRIMARY INSOMNIA: ICD-10-CM

## 2023-08-14 DIAGNOSIS — F39 MOOD DISORDER (HCC): ICD-10-CM

## 2023-08-14 DIAGNOSIS — M51.36 DDD (DEGENERATIVE DISC DISEASE), LUMBAR: ICD-10-CM

## 2023-08-14 DIAGNOSIS — M70.62 TROCHANTERIC BURSITIS OF BOTH HIPS: ICD-10-CM

## 2023-08-14 DIAGNOSIS — M79.18 MYOFASCIAL PAIN: ICD-10-CM

## 2023-08-14 DIAGNOSIS — M47.816 FACET SYNDROME, LUMBAR: ICD-10-CM

## 2023-08-14 DIAGNOSIS — M54.16 LUMBAR RADICULOPATHY: ICD-10-CM

## 2023-08-14 DIAGNOSIS — M79.7 FIBROMYALGIA: ICD-10-CM

## 2023-08-14 DIAGNOSIS — M17.11 PRIMARY OSTEOARTHRITIS OF RIGHT KNEE: ICD-10-CM

## 2023-08-14 DIAGNOSIS — M70.61 TROCHANTERIC BURSITIS OF BOTH HIPS: ICD-10-CM

## 2023-08-14 DIAGNOSIS — M96.1 FAILED BACK SURGICAL SYNDROME: ICD-10-CM

## 2023-08-14 RX ORDER — HYDROCODONE BITARTRATE AND ACETAMINOPHEN 7.5; 325 MG/1; MG/1
1 TABLET ORAL 3 TIMES DAILY PRN
Qty: 105 TABLET | Refills: 0 | Status: SHIPPED | OUTPATIENT
Start: 2023-08-14 | End: 2023-08-17 | Stop reason: SDUPTHER

## 2023-08-14 RX ORDER — NORTRIPTYLINE HYDROCHLORIDE 25 MG/1
25-50 CAPSULE ORAL NIGHTLY
Qty: 60 CAPSULE | Refills: 1 | Status: SHIPPED | OUTPATIENT
Start: 2023-08-14 | End: 2023-08-17 | Stop reason: SDUPTHER

## 2023-08-14 RX ORDER — DULOXETIN HYDROCHLORIDE 60 MG/1
60 CAPSULE, DELAYED RELEASE ORAL DAILY
Qty: 30 CAPSULE | Refills: 1 | Status: SHIPPED | OUTPATIENT
Start: 2023-08-14 | End: 2023-08-17 | Stop reason: SDUPTHER

## 2023-08-14 RX ORDER — LIDOCAINE 36 MG/1
PATCH TOPICAL
Qty: 60 PATCH | Refills: 1 | Status: SHIPPED | OUTPATIENT
Start: 2023-08-14 | End: 2023-08-17 | Stop reason: SDUPTHER

## 2023-08-14 RX ORDER — PREGABALIN 150 MG/1
150 CAPSULE ORAL 2 TIMES DAILY
Qty: 60 CAPSULE | Refills: 1 | Status: SHIPPED | OUTPATIENT
Start: 2023-08-14 | End: 2023-08-17 | Stop reason: SDUPTHER

## 2023-08-14 RX ORDER — MELOXICAM 15 MG/1
15 TABLET ORAL DAILY
Qty: 30 TABLET | Refills: 0 | Status: SHIPPED | OUTPATIENT
Start: 2023-08-14 | End: 2023-08-17 | Stop reason: SDUPTHER

## 2023-08-14 NOTE — PROGRESS NOTES
Radha Oconnor  1970  [id number]    HISTORY OF PRESENT ILLNESS:  Ms. Nati Galindo is a 45 y.o. female returns for a follow up visit for multiple medical problems. Her  presenting problems are   1. Chronic pain syndrome    2. DDD (degenerative disc disease), lumbar    3. Facet syndrome, lumbar    4. Primary insomnia    5. Lumbar radiculopathy    6. Fibromyalgia    7. Failed back surgical syndrome    8. Primary osteoarthritis of right knee    9. Trochanteric bursitis of both hips    10. Myofascial pain    11. Generalized abdominal pain    12. Mood disorder ([address])    . As per information/history obtained from the PADT(patient assessment and documentation tool) -  She complains of pain in the lower back and knees Right with radiation to the hands Right, buttocks, and hips Right She rates the pain 4/10 and describes it as aching, burning. Pain is made worse by: movement. Current treatment regimen has helped relieve about 40% of the pain. She denies side effects from the current pain regimen. Patient reports that since the last follow up visit the physical functioning is unchanged, family/social relationships are unchanged, mood is unchanged and sleep patterns are unchanged, and that the overall functioning is unchanged. Patient denies neurological bowel or bladder. Patient denies misusing/abusing her narcotic pain medications or using any illegal drugs. There are No indicators for possible drug abuse, addiction or diversion problems. Upon obtaining the medical history from Ms. Nati Galindo regarding today's office visit for her presenting problems, patient states her right knee is still hurts, states status post arthroscopic surgery, she states her knee keeps locking up, she states she saw Ortho recently and had more xray's done, she states she did not have fractures. Ms. Nati Galindo says her back is hurting also. She states she will be returning back to work next week.  Patient states the Celebrex is not holding the pain

## 2023-08-17 ENCOUNTER — TELEPHONE (OUTPATIENT)
Dept: PAIN MANAGEMENT | Age: 53
End: 2023-08-17

## 2023-08-17 DIAGNOSIS — M70.61 TROCHANTERIC BURSITIS OF BOTH HIPS: ICD-10-CM

## 2023-08-17 DIAGNOSIS — M79.18 MYOFASCIAL PAIN: ICD-10-CM

## 2023-08-17 DIAGNOSIS — R10.84 GENERALIZED ABDOMINAL PAIN: ICD-10-CM

## 2023-08-17 DIAGNOSIS — M17.11 PRIMARY OSTEOARTHRITIS OF RIGHT KNEE: ICD-10-CM

## 2023-08-17 DIAGNOSIS — G89.4 CHRONIC PAIN SYNDROME: ICD-10-CM

## 2023-08-17 DIAGNOSIS — M96.1 FAILED BACK SURGICAL SYNDROME: ICD-10-CM

## 2023-08-17 DIAGNOSIS — M54.16 LUMBAR RADICULOPATHY: ICD-10-CM

## 2023-08-17 DIAGNOSIS — M51.36 DDD (DEGENERATIVE DISC DISEASE), LUMBAR: ICD-10-CM

## 2023-08-17 DIAGNOSIS — M47.816 FACET SYNDROME, LUMBAR: ICD-10-CM

## 2023-08-17 DIAGNOSIS — M79.7 FIBROMYALGIA: ICD-10-CM

## 2023-08-17 DIAGNOSIS — M70.62 TROCHANTERIC BURSITIS OF BOTH HIPS: ICD-10-CM

## 2023-08-17 RX ORDER — DULOXETIN HYDROCHLORIDE 60 MG/1
60 CAPSULE, DELAYED RELEASE ORAL DAILY
Qty: 30 CAPSULE | Refills: 1 | Status: SHIPPED | OUTPATIENT
Start: 2023-08-17

## 2023-08-17 RX ORDER — NORTRIPTYLINE HYDROCHLORIDE 25 MG/1
25-50 CAPSULE ORAL NIGHTLY
Qty: 60 CAPSULE | Refills: 1 | Status: SHIPPED | OUTPATIENT
Start: 2023-08-17

## 2023-08-17 RX ORDER — MELOXICAM 15 MG/1
15 TABLET ORAL DAILY
Qty: 30 TABLET | Refills: 0 | Status: SHIPPED | OUTPATIENT
Start: 2023-08-17

## 2023-08-17 RX ORDER — LIDOCAINE 36 MG/1
PATCH TOPICAL
Qty: 60 PATCH | Refills: 1 | Status: SHIPPED | OUTPATIENT
Start: 2023-08-17

## 2023-08-17 RX ORDER — PREGABALIN 150 MG/1
150 CAPSULE ORAL 2 TIMES DAILY
Qty: 60 CAPSULE | Refills: 1 | Status: SHIPPED | OUTPATIENT
Start: 2023-08-17 | End: 2023-08-18 | Stop reason: SDUPTHER

## 2023-08-17 RX ORDER — HYDROCODONE BITARTRATE AND ACETAMINOPHEN 7.5; 325 MG/1; MG/1
1 TABLET ORAL 3 TIMES DAILY PRN
Qty: 105 TABLET | Refills: 0 | Status: SHIPPED | OUTPATIENT
Start: 2023-08-17 | End: 2023-08-18 | Stop reason: SDUPTHER
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
## 2023-08-18 RX ORDER — HYDROCODONE BITARTRATE AND ACETAMINOPHEN 7.5; 325 MG/1; MG/1
1 TABLET ORAL 3 TIMES DAILY PRN
Qty: 105 TABLET | Refills: 0 | Status: SHIPPED | OUTPATIENT
Start: 2023-08-18 | End: 2023-09-22

## 2023-08-18 RX ORDER — PREGABALIN 150 MG/1
150 CAPSULE ORAL 2 TIMES DAILY
Qty: 60 CAPSULE | Refills: 1 | Status: SHIPPED | OUTPATIENT
Start: 2023-08-18 | End: 2023-10-17

## 2023-08-18 NOTE — TELEPHONE ENCOUNTER
Patient called and stated that the pharmacy told her they will need to speak with [facility] about the patients Lyrica and Norco because they can't just be called in.      [facility] [id number] - [place], [facility] [address] - F [phone] (Ph: [phone])

## 2023-08-18 NOTE — TELEPHONE ENCOUNTER
Called [facility] and spoke Val and advise it him that it was okay to fill the [place] and Lyrica. Patient voiced her understanding.

## 2023-09-18 ENCOUNTER — OFFICE VISIT (OUTPATIENT)
Dept: PAIN MANAGEMENT | Age: 53
End: 2023-09-18

## 2023-09-18 VITALS
HEART RATE: 76 BPM | DIASTOLIC BLOOD PRESSURE: 89 MMHG | OXYGEN SATURATION: 100 % | BODY MASS INDEX: 36.49 KG/M2 | WEIGHT: 233 LBS | SYSTOLIC BLOOD PRESSURE: 130 MMHG

## 2023-09-18 DIAGNOSIS — G89.4 CHRONIC PAIN SYNDROME: ICD-10-CM

## 2023-09-18 DIAGNOSIS — M47.816 FACET SYNDROME, LUMBAR: ICD-10-CM

## 2023-09-18 DIAGNOSIS — M79.641 RIGHT HAND PAIN: ICD-10-CM

## 2023-09-18 DIAGNOSIS — M54.16 LUMBAR RADICULOPATHY: ICD-10-CM

## 2023-09-18 DIAGNOSIS — M96.1 FAILED BACK SURGICAL SYNDROME: ICD-10-CM

## 2023-09-18 DIAGNOSIS — M70.62 TROCHANTERIC BURSITIS OF BOTH HIPS: ICD-10-CM

## 2023-09-18 DIAGNOSIS — M51.36 DDD (DEGENERATIVE DISC DISEASE), LUMBAR: ICD-10-CM

## 2023-09-18 DIAGNOSIS — R10.84 GENERALIZED ABDOMINAL PAIN: ICD-10-CM

## 2023-09-18 DIAGNOSIS — M70.61 TROCHANTERIC BURSITIS OF BOTH HIPS: ICD-10-CM

## 2023-09-18 DIAGNOSIS — M79.7 FIBROMYALGIA: ICD-10-CM

## 2023-09-18 DIAGNOSIS — M17.11 PRIMARY OSTEOARTHRITIS OF RIGHT KNEE: ICD-10-CM

## 2023-09-18 RX ORDER — NORTRIPTYLINE HYDROCHLORIDE 25 MG/1
25-50 CAPSULE ORAL NIGHTLY
Qty: 60 CAPSULE | Refills: 1 | Status: SHIPPED | OUTPATIENT
Start: 2023-09-18

## 2023-09-18 RX ORDER — HYDROCODONE BITARTRATE AND ACETAMINOPHEN 7.5; 325 MG/1; MG/1
1 TABLET ORAL 3 TIMES DAILY PRN
Qty: 105 TABLET | Refills: 0 | Status: SHIPPED | OUTPATIENT
Start: 2023-09-18 | End: 2023-09-19 | Stop reason: SDUPTHER

## 2023-09-18 RX ORDER — TRIAMCINOLONE ACETONIDE 40 MG/ML
40 INJECTION, SUSPENSION INTRA-ARTICULAR; INTRAMUSCULAR ONCE
Status: COMPLETED | OUTPATIENT
Start: 2023-09-18 | End: 2023-09-18

## 2023-09-18 RX ORDER — MELOXICAM 15 MG/1
15 TABLET ORAL DAILY
Qty: 30 TABLET | Refills: 0 | Status: SHIPPED | OUTPATIENT
Start: 2023-09-18

## 2023-09-18 RX ORDER — PREGABALIN 150 MG/1
150 CAPSULE ORAL 2 TIMES DAILY
Qty: 60 CAPSULE | Refills: 1 | Status: SHIPPED | OUTPATIENT
Start: 2023-09-18 | End: 2023-11-17

## 2023-09-18 RX ORDER — DULOXETIN HYDROCHLORIDE 60 MG/1
60 CAPSULE, DELAYED RELEASE ORAL DAILY
Qty: 30 CAPSULE | Refills: 1 | Status: SHIPPED | OUTPATIENT
Start: 2023-09-18

## 2023-09-18 RX ORDER — LIDOCAINE 36 MG/1
PATCH TOPICAL
Qty: 60 PATCH | Refills: 1 | Status: SHIPPED | OUTPATIENT
Start: 2023-09-18

## 2023-09-18 RX ADMIN — TRIAMCINOLONE ACETONIDE 40 MG: 40 INJECTION, SUSPENSION INTRA-ARTICULAR; INTRAMUSCULAR at 11:21

## 2023-09-19 ENCOUNTER — TELEPHONE (OUTPATIENT)
Dept: PAIN MANAGEMENT | Age: 53
End: 2023-09-19

## 2023-09-19 DIAGNOSIS — M70.61 TROCHANTERIC BURSITIS OF BOTH HIPS: ICD-10-CM

## 2023-09-19 DIAGNOSIS — G89.4 CHRONIC PAIN SYNDROME: ICD-10-CM

## 2023-09-19 DIAGNOSIS — M96.1 FAILED BACK SURGICAL SYNDROME: ICD-10-CM

## 2023-09-19 DIAGNOSIS — M70.62 TROCHANTERIC BURSITIS OF BOTH HIPS: ICD-10-CM

## 2023-09-19 DIAGNOSIS — M79.18 MYOFASCIAL PAIN: ICD-10-CM

## 2023-09-19 DIAGNOSIS — M79.7 FIBROMYALGIA: ICD-10-CM

## 2023-09-19 DIAGNOSIS — M51.36 DDD (DEGENERATIVE DISC DISEASE), LUMBAR: ICD-10-CM

## 2023-09-19 DIAGNOSIS — M47.816 FACET SYNDROME, LUMBAR: ICD-10-CM

## 2023-09-19 DIAGNOSIS — R10.84 GENERALIZED ABDOMINAL PAIN: ICD-10-CM

## 2023-09-19 DIAGNOSIS — M54.16 LUMBAR RADICULOPATHY: ICD-10-CM

## 2023-09-19 DIAGNOSIS — M17.11 PRIMARY OSTEOARTHRITIS OF RIGHT KNEE: ICD-10-CM

## 2023-09-19 RX ORDER — HYDROCODONE BITARTRATE AND ACETAMINOPHEN 7.5; 325 MG/1; MG/1
1 TABLET ORAL 3 TIMES DAILY PRN
Qty: 105 TABLET | Refills: 0 | Status: SHIPPED | OUTPATIENT
Start: 2023-09-19 | End: 2023-09-19

## 2023-09-19 NOTE — TELEPHONE ENCOUNTER
Called pharmacy to cancel [facility] Prescription, new script will be sent to the preferred pharmacy requested.

## 2023-09-19 NOTE — TELEPHONE ENCOUNTER
Patient called saying that her pharmacy dont have her medication (hydrocodone) and is asking for it to be sent to [facility] on [address]      Please advice

## 2023-09-20 RX ORDER — HYDROCODONE BITARTRATE AND ACETAMINOPHEN 7.5; 325 MG/1; MG/1
1 TABLET ORAL 3 TIMES DAILY PRN
Qty: 105 TABLET | Refills: 0 | Status: SHIPPED | OUTPATIENT
Start: 2023-09-20 | End: 2023-10-25

## 2023-10-19 ENCOUNTER — OFFICE VISIT (OUTPATIENT)
Dept: ORTHOPEDIC SURGERY | Age: 53
End: 2023-10-19

## 2023-10-19 VITALS — BODY MASS INDEX: 35.79 KG/M2 | HEIGHT: 67 IN | WEIGHT: 228 LBS

## 2023-10-19 DIAGNOSIS — M17.11 PRIMARY OSTEOARTHRITIS OF RIGHT KNEE: ICD-10-CM

## 2023-10-19 DIAGNOSIS — Z98.890 STATUS POST ARTHROSCOPY OF RIGHT KNEE: Primary | ICD-10-CM

## 2023-10-23 ENCOUNTER — OFFICE VISIT (OUTPATIENT)
Dept: PAIN MANAGEMENT | Age: 53
End: 2023-10-23
Payer: COMMERCIAL

## 2023-10-23 VITALS
WEIGHT: 228 LBS | DIASTOLIC BLOOD PRESSURE: 79 MMHG | HEART RATE: 110 BPM | OXYGEN SATURATION: 95 % | SYSTOLIC BLOOD PRESSURE: 138 MMHG | BODY MASS INDEX: 35.71 KG/M2

## 2023-10-23 DIAGNOSIS — M54.16 LUMBAR RADICULOPATHY: ICD-10-CM

## 2023-10-23 DIAGNOSIS — G89.4 CHRONIC PAIN SYNDROME: ICD-10-CM

## 2023-10-23 DIAGNOSIS — M70.62 TROCHANTERIC BURSITIS OF BOTH HIPS: ICD-10-CM

## 2023-10-23 DIAGNOSIS — M79.18 MYOFASCIAL PAIN: ICD-10-CM

## 2023-10-23 DIAGNOSIS — M96.1 FAILED BACK SURGICAL SYNDROME: ICD-10-CM

## 2023-10-23 DIAGNOSIS — R10.84 GENERALIZED ABDOMINAL PAIN: ICD-10-CM

## 2023-10-23 DIAGNOSIS — M47.816 FACET SYNDROME, LUMBAR: ICD-10-CM

## 2023-10-23 DIAGNOSIS — M79.7 FIBROMYALGIA: ICD-10-CM

## 2023-10-23 DIAGNOSIS — F39 MOOD DISORDER (HCC): ICD-10-CM

## 2023-10-23 DIAGNOSIS — M17.11 PRIMARY OSTEOARTHRITIS OF RIGHT KNEE: ICD-10-CM

## 2023-10-23 DIAGNOSIS — M79.641 RIGHT HAND PAIN: ICD-10-CM

## 2023-10-23 DIAGNOSIS — M51.36 DDD (DEGENERATIVE DISC DISEASE), LUMBAR: ICD-10-CM

## 2023-10-23 DIAGNOSIS — F51.01 PRIMARY INSOMNIA: ICD-10-CM

## 2023-10-23 DIAGNOSIS — M70.61 TROCHANTERIC BURSITIS OF BOTH HIPS: ICD-10-CM

## 2023-10-23 PROCEDURE — 99214 OFFICE O/P EST MOD 30 MIN: CPT | Performed by: INTERNAL MEDICINE

## 2023-10-23 RX ORDER — MELOXICAM 15 MG/1
15 TABLET ORAL DAILY
Qty: 30 TABLET | Refills: 0 | Status: SHIPPED | OUTPATIENT
Start: 2023-10-23

## 2023-10-23 RX ORDER — PREGABALIN 150 MG/1
150 CAPSULE ORAL 2 TIMES DAILY
Qty: 60 CAPSULE | Refills: 1 | Status: SHIPPED | OUTPATIENT
Start: 2023-10-23 | End: 2023-12-22

## 2023-10-23 RX ORDER — DULOXETIN HYDROCHLORIDE 60 MG/1
60 CAPSULE, DELAYED RELEASE ORAL DAILY
Qty: 30 CAPSULE | Refills: 1 | Status: SHIPPED | OUTPATIENT
Start: 2023-10-23

## 2023-10-23 RX ORDER — HYDROCODONE BITARTRATE AND ACETAMINOPHEN 7.5; 325 MG/1; MG/1
1 TABLET ORAL 3 TIMES DAILY PRN
Qty: 105 TABLET | Refills: 0 | Status: SHIPPED | OUTPATIENT
Start: 2023-10-23 | End: 2023-11-27

## 2023-10-23 RX ORDER — NORTRIPTYLINE HYDROCHLORIDE 25 MG/1
25-50 CAPSULE ORAL NIGHTLY
Qty: 60 CAPSULE | Refills: 1 | Status: SHIPPED | OUTPATIENT
Start: 2023-10-23

## 2023-10-23 RX ORDER — LIDOCAINE 36 MG/1
PATCH TOPICAL
Qty: 60 PATCH | Refills: 1 | Status: SHIPPED | OUTPATIENT
Start: 2023-10-23

## 2023-10-23 NOTE — PROGRESS NOTES
Jena Letters  1970  [id number]    HISTORY OF PRESENT ILLNESS:  Ms. Crow Young is a 52 y.o. female returns for a follow up visit for multiple medical problems. Her  presenting problems are   1. Chronic pain syndrome    2. Generalized abdominal pain    3. Trochanteric bursitis of both hips    4. Myofascial pain    5. Lumbar radiculopathy    6. DDD (degenerative disc disease), lumbar    7. Facet syndrome, lumbar    8. Right hand pain    9. Primary osteoarthritis of right knee    10. Fibromyalgia    11. Failed back surgical syndrome    12. Primary insomnia    13. Mood disorder ([address])    . As per information/history obtained from the PADT(patient assessment and documentation tool) -  She complains of pain in the lower back with radiation to the buttocks and knees Left She rates the pain 4/10 and describes it as sharp, aching, burning, numbness, pins and needles. Pain is made worse by: movement, bending, lifting. Current treatment regimen has helped relieve about 40% of the pain. She denies side effects from the current pain regimen. Patient reports that since last follow up visit the physical functioning is better, family/social relationships are unchanged, mood is unchanged sleep patterns are better. Ms. Crow oYung states that since starting the treatment with the current regimen the  overall functioning  in the above aspects is  better,Patient denies neurological bowel or bladder. Patient denies misusing/abusing her narcotic pain medications or using any illegal drugs. There are No indicators for possible drug abuse, addiction or diversion problems. Upon obtaining the medical history from Ms. Crow Young regarding today's office visit for her presenting problems,Patient states the injection has helped the trigger finger. She complains her back and knee hurts the most. She mentions its hard to walk. She reports she is going for gel injections. She says she is using Norco 3 per day along with other adjuvants.  She

## 2023-10-27 ENCOUNTER — TELEPHONE (OUTPATIENT)
Dept: ORTHOPEDIC SURGERY | Age: 53
End: 2023-10-27

## 2023-10-27 NOTE — TELEPHONE ENCOUNTER
L/m on v/m for patient regarding denial of Durolane injection. Denial reason: HA injections not a covered benefit under medial or pharmacy. They may proceed with injection out of pocket and was informed of $625 charge due at time of visit. Patient was asked to call back if she wishes to proceed.

## 2023-10-27 NOTE — TELEPHONE ENCOUNTER
General Question     Subject:  Martinez Vera Giron INJECTION COST  Patient and /or Facility Request: [facility]hal[facility] Robert  Contact Number: [phone]    PATIENT CALLED IN TO SEE HOW MUCH IT WILL COST TO GET THE DUROLANE ( GEL) INJECTION? DO SHE HAVE TO PAY UPFRONT? CAN THE OFFICE EXPLAIN THE BREAKDOWN OF THE INJECTION COST. Diamond Hamman PATIENT MADE AN APPT. ..     PLEASE ADVISE

## 2023-10-30 NOTE — TELEPHONE ENCOUNTER
L/m on v/m for patient regarding cost of Durolane injection. Patient asked to call back with further questions.

## 2023-11-08 ENCOUNTER — OFFICE VISIT (OUTPATIENT)
Dept: ORTHOPEDIC SURGERY | Age: 53
End: 2023-11-08

## 2023-11-08 VITALS — HEIGHT: 67 IN | BODY MASS INDEX: 35.94 KG/M2 | WEIGHT: 229 LBS

## 2023-11-08 DIAGNOSIS — M17.11 PRIMARY OSTEOARTHRITIS OF RIGHT KNEE: Primary | ICD-10-CM

## 2023-11-08 DIAGNOSIS — Z98.890 STATUS POST ARTHROSCOPY OF RIGHT KNEE: ICD-10-CM

## 2023-11-08 RX ORDER — ROPIVACAINE HYDROCHLORIDE 5 MG/ML
2 INJECTION, SOLUTION EPIDURAL; INFILTRATION; PERINEURAL ONCE
Status: COMPLETED | OUTPATIENT
Start: 2023-11-08 | End: 2023-11-08

## 2023-11-08 RX ADMIN — ROPIVACAINE HYDROCHLORIDE 2 ML: 5 INJECTION, SOLUTION EPIDURAL; INFILTRATION; PERINEURAL at 12:40

## 2023-11-08 NOTE — PROGRESS NOTES
11/8/23  12:37 PM        NDC: 35373-652-03   -   Ropivacaine 0.5 %    LOT: [id number]    COMMENT: RIGHT KNEE DUROLANE INJECTION

## 2023-11-27 ENCOUNTER — OFFICE VISIT (OUTPATIENT)
Dept: PAIN MANAGEMENT | Age: 53
End: 2023-11-27
Payer: COMMERCIAL

## 2023-11-27 VITALS
WEIGHT: 229 LBS | DIASTOLIC BLOOD PRESSURE: 86 MMHG | HEART RATE: 76 BPM | OXYGEN SATURATION: 99 % | SYSTOLIC BLOOD PRESSURE: 140 MMHG | BODY MASS INDEX: 35.87 KG/M2

## 2023-11-27 DIAGNOSIS — M17.11 PRIMARY OSTEOARTHRITIS OF RIGHT KNEE: ICD-10-CM

## 2023-11-27 DIAGNOSIS — R10.84 GENERALIZED ABDOMINAL PAIN: ICD-10-CM

## 2023-11-27 DIAGNOSIS — M79.18 MYOFASCIAL PAIN: ICD-10-CM

## 2023-11-27 DIAGNOSIS — M79.641 RIGHT HAND PAIN: ICD-10-CM

## 2023-11-27 DIAGNOSIS — F51.01 PRIMARY INSOMNIA: ICD-10-CM

## 2023-11-27 DIAGNOSIS — M54.16 LUMBAR RADICULOPATHY: ICD-10-CM

## 2023-11-27 DIAGNOSIS — M70.62 TROCHANTERIC BURSITIS OF BOTH HIPS: ICD-10-CM

## 2023-11-27 DIAGNOSIS — M47.816 FACET SYNDROME, LUMBAR: ICD-10-CM

## 2023-11-27 DIAGNOSIS — G89.4 CHRONIC PAIN SYNDROME: ICD-10-CM

## 2023-11-27 DIAGNOSIS — M79.7 FIBROMYALGIA: ICD-10-CM

## 2023-11-27 DIAGNOSIS — M51.36 DDD (DEGENERATIVE DISC DISEASE), LUMBAR: ICD-10-CM

## 2023-11-27 DIAGNOSIS — M70.61 TROCHANTERIC BURSITIS OF BOTH HIPS: ICD-10-CM

## 2023-11-27 DIAGNOSIS — M96.1 FAILED BACK SURGICAL SYNDROME: ICD-10-CM

## 2023-11-27 PROCEDURE — 99213 OFFICE O/P EST LOW 20 MIN: CPT | Performed by: INTERNAL MEDICINE

## 2023-11-27 RX ORDER — NORTRIPTYLINE HYDROCHLORIDE 25 MG/1
25-50 CAPSULE ORAL NIGHTLY
Qty: 60 CAPSULE | Refills: 1 | Status: SHIPPED | OUTPATIENT
Start: 2023-11-27

## 2023-11-27 RX ORDER — MELOXICAM 15 MG/1
15 TABLET ORAL DAILY
Qty: 30 TABLET | Refills: 1 | Status: SHIPPED | OUTPATIENT
Start: 2023-11-27

## 2023-11-27 RX ORDER — HYDROCODONE BITARTRATE AND ACETAMINOPHEN 7.5; 325 MG/1; MG/1
1 TABLET ORAL 3 TIMES DAILY PRN
Qty: 84 TABLET | Refills: 0 | Status: SHIPPED | OUTPATIENT
Start: 2023-11-27 | End: 2023-12-25

## 2023-11-27 NOTE — PROGRESS NOTES
Lumbar radiculopathy    10. Failed back surgical syndrome        PLAN:  Informed verbal consent was obtained. Risks and benefits of the medications and other alternative treatments  including no treatment have been discussed with the patient. Any questions related to these were addressed. The common side effects of these medications were also explained to the patient.    -Continue with Norco 3 per day  -She was advised to increase fluids ( 5-7  glasses of fluid daily), limit caffeine, avoid cheese products, increase dietary fiber, increase activity and exercise as tolerated and relax regularly and enjoy meals   -Walking 20-30 minutes daily as tolerated   -Continue with all adjuvants   -She was advised weight reduction, diet changes- 800-1200 maricarmen diet, diet diary, exercising, nutritional  consult increased physical activity as tolerated   -Adv Biofeedback, relaxation and meditation techniques, mindfulness- based stress reduction. Increase social/leisure activities. CBT techniques , Mind body practices (yoga, Jh- chi ) were also recommended. Current Outpatient Medications   Medication Sig Dispense Refill    HYDROcodone-acetaminophen (NORCO) 7.5-325 MG per tablet Take 1 tablet by mouth 3 times daily as needed for Pain for up to 35 days. 105 tablet 0    pregabalin (LYRICA) 150 MG capsule Take 1 capsule by mouth 2 times daily for 60 days. 60 capsule 1    DULoxetine (CYMBALTA) 60 MG extended release capsule Take 1 capsule by mouth daily 30 capsule 1    nortriptyline (PAMELOR) 25 MG capsule Take 1-2 capsules by mouth nightly 60 capsule 1    Lidocaine (ZTLIDO) 1.8 % PTCH Apply 1 patch every 12 hours then off 12 hours 60 patch 1    meloxicam (MOBIC) 15 MG tablet Take 1 tablet by mouth daily 30 tablet 0    fluticasone (FLONASE) 50 MCG/ACT nasal spray 2 sprays by Nasal route daily       No current facility-administered medications for this visit.        General Goals of current treatment regimen include improvement in

## 2023-12-22 ENCOUNTER — OFFICE VISIT (OUTPATIENT)
Dept: PAIN MANAGEMENT | Age: 53
End: 2023-12-22
Payer: COMMERCIAL

## 2023-12-22 VITALS
WEIGHT: 220 LBS | BODY MASS INDEX: 34.46 KG/M2 | OXYGEN SATURATION: 99 % | SYSTOLIC BLOOD PRESSURE: 142 MMHG | HEART RATE: 104 BPM | DIASTOLIC BLOOD PRESSURE: 95 MMHG

## 2023-12-22 DIAGNOSIS — M17.11 PRIMARY OSTEOARTHRITIS OF RIGHT KNEE: ICD-10-CM

## 2023-12-22 DIAGNOSIS — R10.84 GENERALIZED ABDOMINAL PAIN: ICD-10-CM

## 2023-12-22 DIAGNOSIS — M96.1 FAILED BACK SURGICAL SYNDROME: ICD-10-CM

## 2023-12-22 DIAGNOSIS — M79.7 FIBROMYALGIA: ICD-10-CM

## 2023-12-22 DIAGNOSIS — M79.18 MYOFASCIAL PAIN: ICD-10-CM

## 2023-12-22 DIAGNOSIS — M47.816 FACET SYNDROME, LUMBAR: ICD-10-CM

## 2023-12-22 DIAGNOSIS — M54.16 LUMBAR RADICULOPATHY: ICD-10-CM

## 2023-12-22 DIAGNOSIS — M70.62 TROCHANTERIC BURSITIS OF BOTH HIPS: ICD-10-CM

## 2023-12-22 DIAGNOSIS — G89.4 CHRONIC PAIN SYNDROME: ICD-10-CM

## 2023-12-22 DIAGNOSIS — M79.641 RIGHT HAND PAIN: ICD-10-CM

## 2023-12-22 DIAGNOSIS — M70.61 TROCHANTERIC BURSITIS OF BOTH HIPS: ICD-10-CM

## 2023-12-22 DIAGNOSIS — M51.36 DDD (DEGENERATIVE DISC DISEASE), LUMBAR: ICD-10-CM

## 2023-12-22 PROCEDURE — 99213 OFFICE O/P EST LOW 20 MIN: CPT | Performed by: INTERNAL MEDICINE

## 2023-12-22 RX ORDER — PREGABALIN 150 MG/1
150 CAPSULE ORAL 2 TIMES DAILY
Qty: 60 CAPSULE | Refills: 1 | Status: SHIPPED | OUTPATIENT
Start: 2023-12-22 | End: 2024-02-20

## 2023-12-22 RX ORDER — LIDOCAINE 36 MG/1
PATCH TOPICAL
Qty: 60 PATCH | Refills: 1 | Status: SHIPPED | OUTPATIENT
Start: 2023-12-22

## 2023-12-22 RX ORDER — MELOXICAM 15 MG/1
15 TABLET ORAL DAILY
Qty: 30 TABLET | Refills: 1 | Status: SHIPPED | OUTPATIENT
Start: 2023-12-22

## 2023-12-22 RX ORDER — DULOXETIN HYDROCHLORIDE 60 MG/1
60 CAPSULE, DELAYED RELEASE ORAL DAILY
Qty: 30 CAPSULE | Refills: 1 | Status: SHIPPED | OUTPATIENT
Start: 2023-12-22

## 2023-12-22 RX ORDER — NORTRIPTYLINE HYDROCHLORIDE 25 MG/1
25-50 CAPSULE ORAL NIGHTLY
Qty: 60 CAPSULE | Refills: 1 | Status: SHIPPED | OUTPATIENT
Start: 2023-12-22

## 2023-12-22 RX ORDER — HYDROCODONE BITARTRATE AND ACETAMINOPHEN 7.5; 325 MG/1; MG/1
1 TABLET ORAL 3 TIMES DAILY PRN
Qty: 105 TABLET | Refills: 0 | Status: SHIPPED | OUTPATIENT
Start: 2023-12-22 | End: 2024-01-26

## 2023-12-22 NOTE — PROGRESS NOTES
Trista Huang  1970  [id number]      HISTORY OF PRESENT ILLNESS:  Ms. Armando Bains is a 52 y.o. female returns for a follow up visit for pain management  She has a diagnosis of   1. Chronic pain syndrome    2. Facet syndrome, lumbar    3. Right hand pain    4. Generalized abdominal pain    5. Trochanteric bursitis of both hips    6. Primary osteoarthritis of right knee    7. Myofascial pain    8. Fibromyalgia    9. DDD (degenerative disc disease), lumbar    10. Lumbar radiculopathy    11. Failed back surgical syndrome    12. Mood disorder ([address])    13. Primary insomnia    . As per information/history obtained from the PADT(patient assessment and documentation tool) -  She complains of pain in the lower back with radiation to the knees Right She rates the pain 3/10 and describes it as aching. Pain is made worse by: movement, walking, standing, sitting, bending, lifting. She denies any side effects from the current pain regimen. Patient reports that since last follow up visit the physical functioning is better, family/social relationships are unchanged, mood is unchanged sleep patterns are better. Ms. Armando Bains states that since starting the treatment with the current regimen the  overall functioning  in the above aspects is  better, Patient denies misusing/abusing her narcotic pain medications or using any illegal drugs. There are No indicators for possible drug abuse, addiction or diversion problems. Upon obtaining the medical history from Ms. Armando Bains regarding today's office visit for her presenting problems, Patient reports she has been managing okay with the medications. She complains her right knee still hurts. She mentions status post gel injections. She denies any side effects. She says she is using Norco 3 per day along with other adjuvants. ALLERGIES: Patients list of allergies were reviewed     MEDICATIONS: Ms. Armando Bains list of medications were reviewed. Her current medications are   Outpatient

## 2024-01-03 ENCOUNTER — TELEPHONE (OUTPATIENT)
Dept: ORTHOPEDIC SURGERY | Age: 54
End: 2024-01-03

## 2024-01-03 ENCOUNTER — OFFICE VISIT (OUTPATIENT)
Dept: ORTHOPEDIC SURGERY | Age: 54
End: 2024-01-03

## 2024-01-03 VITALS — HEIGHT: 67 IN | BODY MASS INDEX: 34.53 KG/M2 | WEIGHT: 220 LBS

## 2024-01-03 DIAGNOSIS — M17.11 PRIMARY OSTEOARTHRITIS OF RIGHT KNEE: Primary | ICD-10-CM

## 2024-01-03 SDOH — HEALTH STABILITY: PHYSICAL HEALTH: ON AVERAGE, HOW MANY DAYS PER WEEK DO YOU ENGAGE IN MODERATE TO STRENUOUS EXERCISE (LIKE A BRISK WALK)?: 0 DAYS

## 2024-01-03 NOTE — PROGRESS NOTES
Dr Arnulfo Diop      Date /Time 1/3/2024       10:15 AM EST  Name Dorinda Sánchez             1970   [facility]  MRN [id number]                Chief Complaint   Patient presents with    New Patient     Np rt knee  OA  Ref d abiodun/ john  H/o arthroscopic sx 6/13/2023  Has imaging        History of Present Illness  Dorinda Sánchez is a 53 y.o. female who presents with right knee pain.   She complains today of trouble with walking, stairs and flexion which causes her sharp and aching medial knee pain.     She has past history of arthroscopic surgery; synovectomy, partial medial meniscectomy, chondroplasty medial femoral condyle and removal of loose bodies on 6/13/23 with  and his team, She also has tried conservative treatment of cortisone and gel injections as well with no relief, also in pain management for her back pain.   She is here today for further treatment options.      Sent in consultation by Megan Padilla.        Past History  Past Medical History:   Diagnosis Date    Chronic pain     Colitis     Seasonal allergies      Past Surgical History:   Procedure Laterality Date    BACK SURGERY      SPINAL FUSION    COLONOSCOPY      ENDOMETRIAL ABLATION      ESOPHAGOGASTRODUODENOSCOPY      FOOT SURGERY Right     JOINT REPLACEMENT      KNEE ARTHROSCOPY Right 6/13/2023    RIGHT KNEE ARTHROSCOPY, MEDIAL MENISCUS DEBRIDEMENT performed by Jose Carlos Wharton MD at [facility] OR    URETHRA SURGERY       Social History     Tobacco Use    Smoking status: Never    Smokeless tobacco: Never   Substance Use Topics    Alcohol use: No      Current Outpatient Medications on File Prior to Visit   Medication Sig Dispense Refill    DULoxetine (CYMBALTA) 60 MG extended release capsule Take 1 capsule by mouth daily 30 capsule 1    HYDROcodone-acetaminophen (NORCO) 7.5-325 MG per tablet Take 1 tablet by mouth 3 times daily as needed for Pain for up to 35 days. 105 tablet 0    Lidocaine

## 2024-01-08 ENCOUNTER — TELEPHONE (OUTPATIENT)
Dept: ORTHOPEDIC SURGERY | Age: 54
End: 2024-01-08

## 2024-01-09 ENCOUNTER — TELEPHONE (OUTPATIENT)
Dept: ORTHOPEDIC SURGERY | Age: 54
End: 2024-01-09

## 2024-01-09 NOTE — TELEPHONE ENCOUNTER
Called patient and left message regarding scheduling surgery. Also called and left messages on 01/03 and 01/08. Will wait for patient to return call. KB

## 2024-01-12 DIAGNOSIS — M17.11 PRIMARY OSTEOARTHRITIS OF RIGHT KNEE: Primary | ICD-10-CM

## 2024-01-15 NOTE — PROGRESS NOTES
Dorinda A Gonzalo    Age 53 y.o.    female    1970    MRN [id number]    2/8/2024  Arrival Time_____________  OR Time____________115 Min     Procedure(s):  RIGHT TOTAL KNEE ARTHROPLASTY                ASHLEY NOTE:  ADDUCTOR CANAL BLOCK                      General   Surgeon(s):  Arnulfo Diop, MD      DAY ADMIT ___  SDS/OP ___  OUTPT IN BED ___        Phone [phone] (home)     PCP _____________________ Phone_________________ Epic ( ) Epic CE ( ) Appt ________    ADDITIONAL INFO __________________________________ Cardio/Consult _____________    NOTES _____________________________________________________________________    ____________________________________________________________________________    PAT APPT DATE:________ TIME: ________  FAXED QAD: _______  (__) H&P w/ Hospitalist  __________________________________________________________________________  Preop Nurse phone screen complete: _____________  (__) CBC     (__) W/ DIFF ___________     (__) Hgb A1C    ___________  (__) CHEST X RAY   __________  (__) LIPID PROFILE  ___________  (__) EKG   __________  (__) PT-INR / APTT  ___________  (__) PFT's   __________  (__) BMP   ___________  (__) CAROTIDS  __________  (__) CMP   ___________  (__) VEIN MAPPING  __________  (__) U/A   ___________  (__) HISTORY & PHYSICAL __________  (__) URINE C & S  ___________  (__) CARDIAC CLEARANCE __________  (__) U/A W/ FLEX  ___________  (__) PULM. CLEARANCE __________  (__) SERUM PREGNANCY ___________  (__) Check Epic DOS orders __________  (__) TYPE & SCREEN __________repeat ( ) (__)  __________________ __________  (__) Albumin / Prealbumin ___________  (__)  __________________ __________  (__) TRANSFERRIN  ___________  (__)  __________________ __________  (__) LIVER PROFILE  ___________  (__)  __________________ __________  (__) MRSA NASAL SWAB ___________  (__) URINE PREG DOS __________  (__) SED RATE  ___________  (__) BLOOD SUGAR DOS __________  (__) C-REACTIVE

## 2024-01-17 ENCOUNTER — TELEPHONE (OUTPATIENT)
Dept: ORTHOPEDIC SURGERY | Age: 54
End: 2024-01-17

## 2024-01-17 NOTE — TELEPHONE ENCOUNTER
PA requsted via [place] by [facility]  Reference # [id number]

## 2024-01-18 NOTE — TELEPHONE ENCOUNTER
Auth: # [id number]     Date Range: 02/08/2024 - 04/07/2024   Type of SX:  HOSPITAL OBSERVATION   Location: [facility]  CPT: 38894   DX Code: M17.11   SX area:  KNEE  Insurance: MIK[employer]IL

## 2024-01-25 ENCOUNTER — OFFICE VISIT (OUTPATIENT)
Dept: PAIN MANAGEMENT | Age: 54
End: 2024-01-25
Payer: COMMERCIAL

## 2024-01-25 VITALS
DIASTOLIC BLOOD PRESSURE: 73 MMHG | OXYGEN SATURATION: 97 % | HEART RATE: 100 BPM | SYSTOLIC BLOOD PRESSURE: 120 MMHG | WEIGHT: 232 LBS | BODY MASS INDEX: 36.34 KG/M2

## 2024-01-25 DIAGNOSIS — M79.18 MYOFASCIAL PAIN: ICD-10-CM

## 2024-01-25 DIAGNOSIS — F39 MOOD DISORDER (HCC): ICD-10-CM

## 2024-01-25 DIAGNOSIS — M79.641 RIGHT HAND PAIN: ICD-10-CM

## 2024-01-25 DIAGNOSIS — M51.36 DDD (DEGENERATIVE DISC DISEASE), LUMBAR: ICD-10-CM

## 2024-01-25 DIAGNOSIS — R10.84 GENERALIZED ABDOMINAL PAIN: ICD-10-CM

## 2024-01-25 DIAGNOSIS — M70.62 TROCHANTERIC BURSITIS OF BOTH HIPS: ICD-10-CM

## 2024-01-25 DIAGNOSIS — M96.1 FAILED BACK SURGICAL SYNDROME: ICD-10-CM

## 2024-01-25 DIAGNOSIS — G89.4 CHRONIC PAIN SYNDROME: ICD-10-CM

## 2024-01-25 DIAGNOSIS — M47.816 FACET SYNDROME, LUMBAR: ICD-10-CM

## 2024-01-25 DIAGNOSIS — M70.61 TROCHANTERIC BURSITIS OF BOTH HIPS: ICD-10-CM

## 2024-01-25 DIAGNOSIS — M79.7 FIBROMYALGIA: ICD-10-CM

## 2024-01-25 DIAGNOSIS — F51.01 PRIMARY INSOMNIA: ICD-10-CM

## 2024-01-25 DIAGNOSIS — M17.11 PRIMARY OSTEOARTHRITIS OF RIGHT KNEE: ICD-10-CM

## 2024-01-25 DIAGNOSIS — M54.16 LUMBAR RADICULOPATHY: ICD-10-CM

## 2024-01-25 PROCEDURE — 99214 OFFICE O/P EST MOD 30 MIN: CPT | Performed by: INTERNAL MEDICINE

## 2024-01-25 RX ORDER — HYDROCODONE BITARTRATE AND ACETAMINOPHEN 7.5; 325 MG/1; MG/1
1 TABLET ORAL 3 TIMES DAILY PRN
Qty: 105 TABLET | Refills: 0 | Status: SHIPPED | OUTPATIENT
Start: 2024-01-25 | End: 2024-02-29

## 2024-01-25 RX ORDER — NORTRIPTYLINE HYDROCHLORIDE 25 MG/1
25-50 CAPSULE ORAL NIGHTLY
Qty: 60 CAPSULE | Refills: 1 | Status: SHIPPED | OUTPATIENT
Start: 2024-01-25

## 2024-01-25 RX ORDER — DULOXETIN HYDROCHLORIDE 60 MG/1
60 CAPSULE, DELAYED RELEASE ORAL DAILY
Qty: 30 CAPSULE | Refills: 1 | Status: SHIPPED | OUTPATIENT
Start: 2024-01-25

## 2024-01-25 RX ORDER — PREGABALIN 150 MG/1
150 CAPSULE ORAL 2 TIMES DAILY
Qty: 60 CAPSULE | Refills: 1 | Status: SHIPPED | OUTPATIENT
Start: 2024-01-25 | End: 2024-03-25

## 2024-01-25 RX ORDER — LIDOCAINE 36 MG/1
PATCH TOPICAL
Qty: 60 PATCH | Refills: 1 | Status: SHIPPED | OUTPATIENT
Start: 2024-01-25 | End: 2024-01-29

## 2024-01-25 RX ORDER — MELOXICAM 15 MG/1
15 TABLET ORAL DAILY
Qty: 30 TABLET | Refills: 1 | Status: SHIPPED | OUTPATIENT
Start: 2024-01-25

## 2024-01-25 NOTE — PROGRESS NOTES
Dorinda Sánchez  1970  [id number]    HISTORY OF PRESENT ILLNESS:  Ms. Sánchez is a 53 y.o. female returns for a follow up visit for multiple medical problems.  Her  presenting problems are   1. Chronic pain syndrome    2. Right hand pain    3. Trochanteric bursitis of both hips    4. Myofascial pain    5. DDD (degenerative disc disease), lumbar    6. Failed back surgical syndrome    7. Facet syndrome, lumbar    8. Generalized abdominal pain    9. Primary osteoarthritis of right knee    10. Fibromyalgia    11. Lumbar radiculopathy    .    As per information/history obtained from the PADT(patient assessment and documentation tool) -  She complains of pain in the lower back and knees Right with radiation to the buttocks, hips Right, and upper leg Right She rates the pain 4/10 and describes it as sharp, aching.  Pain is made worse by: movement, walking, standing, sitting, bending.  Current treatment regimen has helped relieve about 50% of the pain.  She denies side effects from the current pain regimen.   Patient reports that since last follow up visit the physical functioning is better, family/social relationships are unchanged, mood is unchanged sleep patterns are unchanged.  Ms. Sánchez states that since starting the treatment with the current regimen the  overall functioning  in the above aspects is  better,Patient denies neurological bowel or bladder. Patient denies misusing/abusing her narcotic pain medications or using any illegal drugs.  There are No indicators for possible drug abuse, addiction or diversion problems. Upon obtaining the medical history from Ms. Sánchez regarding today's office visit for her presenting problems, patient states she has been doing fair, she is managing with the medications. Ms. Sánchez reports she is working full time. She mentions she is using Norco 3 per day along with other adjuvants. Patient states she is going for knee surgery in 2 weeks. Patient states her sleep is fair. Has

## 2024-01-26 ENCOUNTER — TELEPHONE (OUTPATIENT)
Dept: PAIN MANAGEMENT | Age: 54
End: 2024-01-26

## 2024-01-26 NOTE — TELEPHONE ENCOUNTER
Received PA from [facility].    Submitted PA for ZTLIDO [employer] Key: [id number] STATUS: PENDING.    Follow up done daily; if no decision with in three days we will refax.  If another three days goes by with no decision will call the insurance for status.

## 2024-01-29 NOTE — PROGRESS NOTES
Surgery Date and Time:  2/8/24 @ 09:00 am    Arrival Time:  07:00 am    The instructions given when and if a patient needs to stop oral intake prior to surgery varies. Follow the instructions you were given by your    Surgeon or RN during the Pre-op call.       __X__Nothing to eat or to drink after Midnight the night before the surgery. NO gum, mints, candy or ice chips day of surgery.                   ___X__ Carbo-loading or ENHANCED RECOVERY instructions will be given to select patients.  Please do the following:     The evening before your surgery after dinner before midnight drink 40 ounces (2 - 20 ounce bottles) of Gatorade. If you are diabetic use sugar free.                   Only take the following medications with a small sip of water the morning of surgery:  none               Aspirin, Ibuprofen, Advil, Naproxen, Vitamin E and other Anti-inflammatory products and supplements should be stopped for 5 -7days before surgery      or as directed by your physician.     - Do not smoke or vape, and do not drink any alcoholic beverages 24 hours prior to surgery, this includes NA Beer. Refrain from using any recreational drugs,     including non-prescribed prescription drugs.       -You may brush your teeth and gargle the morning of surgery.  DO NOT SWALLOW WATER.    -You MUST plan for a responsible adult to stay on site while you are here and take you home after your surgery. You will not be allowed to leave alone or drive               yourself home. It is requested someone stay with you the first 24 hrs. Your surgery will be cancelled if you do not have a ride home with a responsible adult.    -A parent/legal guardian must accompany a child scheduled for surgery and plan to stay at the hospital until the child is discharged.  Please do not bring other                children with you.    -Please wear simple, loose-fitting clothing to the hospital. Do not bring valuables (money, credit cards, checkbooks, etc.)  you on the day of the surgery. If you use oxygen, please bring portable tank with you.              -For your convenience [facility] has an outpatient pharmacy on site to fill your prescriptions prior to 5 pm.              -Bring a complete list of all your medications with name and dose including any supplements.              Visitor policy: May have 2-3 visitors in Surgery Waiting Room. Visiting hours are 8a-8p. Overnight visitors will be at the discretion of the unit nurse.    No visitors under the age of 14 permitted.     *Please call [facility] Preadmission Testing Department for any further questions  [phone]    [facility] - MAIN ENTRANCE   [address] [place] [address]   [id number]        Email sent:  1/29/24

## 2024-01-30 ENCOUNTER — TELEPHONE (OUTPATIENT)
Dept: ORTHOPEDIC SURGERY | Age: 54
End: 2024-01-30

## 2024-01-30 NOTE — TELEPHONE ENCOUNTER
ORTHOPAEDIC NURSE NAVIGATOR SUMMARY NOTE      Anticipated Date of Surgery: 2/8/24    Recieved Pre-Op Education: yes   In person class:no  Pt used educational link:Yes   Pt completed pre and post test to measure learning:Yes    If pt did not complete either, why not?  N/A  ERAS protocol explained to pt. Pt does not have the following medical conditions:  -Diabetes  -Gastroparesis  -CHF  -Fluid restricted diet  -Known difficult airway  Pt instructed to drink up to 40 oz of Gatorade type drink the evening prior to surgery.   Pt told to HOLD 40 oz of water the day of surgery due to BMI over 35  Pt verbalized understanding.       PCP: Megan Padilla   Phone #: [phone]    Date of PCP Visit for H&P: 2/2/24    Any Noted Concerns from PCP prior to surgery:  No   If Yes, what concerns?:    IS THE PATIENT IN A PAIN MANAGEMENT PROGRAM?:   Yes     Review of Past Medical History Reveals History of:      Critical Lab Values:   Hgb/Hct:   Hemoglobin (g/dL)   Date Value   09/15/2018 14.3   /  Hematocrit (%)   Date Value   09/15/2018 42.4      HgbA1C:  No results found for: \"LABA1C\"   Albumin:    Lab Results   Component Value Date    LABALBU 4.1 04/03/2019      BUN/Cr:   BUN (mg/dL)   Date Value   04/03/2019 14   /  Creatinine (mg/dL)   Date Value   04/03/2019 0.6      BMI:    BMI Readings from Last 1 Encounters:   01/25/24 36.34 kg/m²        Coronary Artery Disease/HTN/CHF History: No       -On any anticoagulation-NONE       Diabetes History: No      Pulmonary: COPD/Emphysema/ Use of home oxygen: none     Alcohol use:        DVT Risk Stratification:  Low       -Smoking history or use of estrogen-         BMI Greater than 40 at time of scheduling?: No     Additional Medical Concerns:         Discharge Disposition Information:     Attended Pre-Hab Program: no     Anticipated Discharge Disposition:  Home with [facility]    Who will be with patient at home following discharge?        Equipment pt already has:  NEED

## 2024-02-05 ENCOUNTER — ANESTHESIA EVENT (OUTPATIENT)
Dept: OPERATING ROOM | Age: 54
End: 2024-02-05
Payer: COMMERCIAL

## 2024-02-08 ENCOUNTER — HOSPITAL ENCOUNTER (OUTPATIENT)
Age: 54
Setting detail: OBSERVATION
Discharge: HOME | End: 2024-02-09
Attending: STUDENT IN AN ORGANIZED HEALTH CARE EDUCATION/TRAINING PROGRAM | Admitting: STUDENT IN AN ORGANIZED HEALTH CARE EDUCATION/TRAINING PROGRAM
Payer: COMMERCIAL

## 2024-02-08 ENCOUNTER — ANESTHESIA (OUTPATIENT)
Dept: OPERATING ROOM | Age: 54
End: 2024-02-08
Payer: COMMERCIAL

## 2024-02-08 ENCOUNTER — APPOINTMENT (OUTPATIENT)
Dept: GENERAL RADIOLOGY | Age: 54
End: 2024-02-08
Attending: STUDENT IN AN ORGANIZED HEALTH CARE EDUCATION/TRAINING PROGRAM
Payer: COMMERCIAL

## 2024-02-08 DIAGNOSIS — M70.62 TROCHANTERIC BURSITIS OF BOTH HIPS: ICD-10-CM

## 2024-02-08 DIAGNOSIS — M79.641 RIGHT HAND PAIN: ICD-10-CM

## 2024-02-08 DIAGNOSIS — G89.4 CHRONIC PAIN SYNDROME: ICD-10-CM

## 2024-02-08 DIAGNOSIS — M70.61 TROCHANTERIC BURSITIS OF BOTH HIPS: ICD-10-CM

## 2024-02-08 DIAGNOSIS — Z96.651 S/P TOTAL KNEE ARTHROPLASTY, RIGHT: Primary | ICD-10-CM

## 2024-02-08 LAB
25(OH)D3 SERPL-MCNC: 35.8 NG/ML
ABO + RH BLD: NORMAL
BLD GP AB SCN SERPL QL: NORMAL
GLUCOSE BLD-MCNC: 97 MG/DL (ref 70–99)
PERFORMED ON: NORMAL

## 2024-02-08 PROCEDURE — 6370000000 HC RX 637 (ALT 250 FOR IP): Performed by: ANESTHESIOLOGY

## 2024-02-08 PROCEDURE — 2580000003 HC RX 258: Performed by: NURSE ANESTHETIST, CERTIFIED REGISTERED

## 2024-02-08 PROCEDURE — 64447 NJX AA&/STRD FEMORAL NRV IMG: CPT | Performed by: ANESTHESIOLOGY

## 2024-02-08 PROCEDURE — 97162 PT EVAL MOD COMPLEX 30 MIN: CPT

## 2024-02-08 PROCEDURE — G0378 HOSPITAL OBSERVATION PER HR: HCPCS

## 2024-02-08 PROCEDURE — 3700000001 HC ADD 15 MINUTES (ANESTHESIA): Performed by: STUDENT IN AN ORGANIZED HEALTH CARE EDUCATION/TRAINING PROGRAM

## 2024-02-08 PROCEDURE — 2709999900 HC NON-CHARGEABLE SUPPLY: Performed by: STUDENT IN AN ORGANIZED HEALTH CARE EDUCATION/TRAINING PROGRAM

## 2024-02-08 PROCEDURE — 86901 BLOOD TYPING SEROLOGIC RH(D): CPT

## 2024-02-08 PROCEDURE — 2500000003 HC RX 250 WO HCPCS: Performed by: ANESTHESIOLOGY

## 2024-02-08 PROCEDURE — 97110 THERAPEUTIC EXERCISES: CPT

## 2024-02-08 PROCEDURE — 2580000003 HC RX 258: Performed by: STUDENT IN AN ORGANIZED HEALTH CARE EDUCATION/TRAINING PROGRAM

## 2024-02-08 PROCEDURE — C1776 JOINT DEVICE (IMPLANTABLE): HCPCS | Performed by: STUDENT IN AN ORGANIZED HEALTH CARE EDUCATION/TRAINING PROGRAM

## 2024-02-08 PROCEDURE — 6360000002 HC RX W HCPCS: Performed by: ANESTHESIOLOGY

## 2024-02-08 PROCEDURE — 6360000002 HC RX W HCPCS: Performed by: STUDENT IN AN ORGANIZED HEALTH CARE EDUCATION/TRAINING PROGRAM

## 2024-02-08 PROCEDURE — 6360000002 HC RX W HCPCS: Performed by: NURSE ANESTHETIST, CERTIFIED REGISTERED

## 2024-02-08 PROCEDURE — 6360000002 HC RX W HCPCS: Performed by: SPECIALIST/TECHNOLOGIST

## 2024-02-08 PROCEDURE — 97116 GAIT TRAINING THERAPY: CPT

## 2024-02-08 PROCEDURE — 2500000003 HC RX 250 WO HCPCS: Performed by: NURSE ANESTHETIST, CERTIFIED REGISTERED

## 2024-02-08 PROCEDURE — C1713 ANCHOR/SCREW BN/BN,TIS/BN: HCPCS | Performed by: STUDENT IN AN ORGANIZED HEALTH CARE EDUCATION/TRAINING PROGRAM

## 2024-02-08 PROCEDURE — 6370000000 HC RX 637 (ALT 250 FOR IP): Performed by: STUDENT IN AN ORGANIZED HEALTH CARE EDUCATION/TRAINING PROGRAM

## 2024-02-08 PROCEDURE — 82306 VITAMIN D 25 HYDROXY: CPT

## 2024-02-08 PROCEDURE — 94761 N-INVAS EAR/PLS OXIMETRY MLT: CPT

## 2024-02-08 PROCEDURE — 2500000003 HC RX 250 WO HCPCS: Performed by: STUDENT IN AN ORGANIZED HEALTH CARE EDUCATION/TRAINING PROGRAM

## 2024-02-08 PROCEDURE — 2580000003 HC RX 258: Performed by: SPECIALIST/TECHNOLOGIST

## 2024-02-08 PROCEDURE — 3600000004 HC SURGERY LEVEL 4 BASE: Performed by: STUDENT IN AN ORGANIZED HEALTH CARE EDUCATION/TRAINING PROGRAM

## 2024-02-08 PROCEDURE — 7100000001 HC PACU RECOVERY - ADDTL 15 MIN: Performed by: STUDENT IN AN ORGANIZED HEALTH CARE EDUCATION/TRAINING PROGRAM

## 2024-02-08 PROCEDURE — 3600000014 HC SURGERY LEVEL 4 ADDTL 15MIN: Performed by: STUDENT IN AN ORGANIZED HEALTH CARE EDUCATION/TRAINING PROGRAM

## 2024-02-08 PROCEDURE — 7100000000 HC PACU RECOVERY - FIRST 15 MIN: Performed by: STUDENT IN AN ORGANIZED HEALTH CARE EDUCATION/TRAINING PROGRAM

## 2024-02-08 PROCEDURE — 3700000000 HC ANESTHESIA ATTENDED CARE: Performed by: STUDENT IN AN ORGANIZED HEALTH CARE EDUCATION/TRAINING PROGRAM

## 2024-02-08 PROCEDURE — 27447 TOTAL KNEE ARTHROPLASTY: CPT | Performed by: STUDENT IN AN ORGANIZED HEALTH CARE EDUCATION/TRAINING PROGRAM

## 2024-02-08 PROCEDURE — A4217 STERILE WATER/SALINE, 500 ML: HCPCS | Performed by: STUDENT IN AN ORGANIZED HEALTH CARE EDUCATION/TRAINING PROGRAM

## 2024-02-08 PROCEDURE — 2700000000 HC OXYGEN THERAPY PER DAY

## 2024-02-08 PROCEDURE — 86850 RBC ANTIBODY SCREEN: CPT

## 2024-02-08 PROCEDURE — 2720000010 HC SURG SUPPLY STERILE: Performed by: STUDENT IN AN ORGANIZED HEALTH CARE EDUCATION/TRAINING PROGRAM

## 2024-02-08 PROCEDURE — 86900 BLOOD TYPING SEROLOGIC ABO: CPT

## 2024-02-08 PROCEDURE — 6370000000 HC RX 637 (ALT 250 FOR IP): Performed by: SPECIALIST/TECHNOLOGIST

## 2024-02-08 PROCEDURE — 73560 X-RAY EXAM OF KNEE 1 OR 2: CPT

## 2024-02-08 DEVICE — PSN MC VE ASF R 12MM 6-7/EF: Type: IMPLANTABLE DEVICE | Site: KNEE | Status: FUNCTIONAL

## 2024-02-08 DEVICE — BASEPLATE TIB KEELED 0 DEG E RT KNEE SPIKE OSSEOTI PERSONA: Type: IMPLANTABLE DEVICE | Site: KNEE | Status: FUNCTIONAL

## 2024-02-08 DEVICE — COMPONENT FEM CR NAR 6 RT KNEE CEM COCR STRL PERSONA LTX: Type: IMPLANTABLE DEVICE | Site: KNEE | Status: FUNCTIONAL

## 2024-02-08 RX ORDER — ASPIRIN 81 MG/1
81 TABLET, CHEWABLE ORAL 2 TIMES DAILY
Qty: 60 TABLET | Refills: 0 | Status: SHIPPED | OUTPATIENT
Start: 2024-02-08

## 2024-02-08 RX ORDER — ACETAMINOPHEN 500 MG
1000 TABLET ORAL ONCE
Status: DISCONTINUED | OUTPATIENT
Start: 2024-02-08 | End: 2024-02-08 | Stop reason: HOSPADM

## 2024-02-08 RX ORDER — SODIUM CHLORIDE 0.9 % (FLUSH) 0.9 %
5-40 SYRINGE (ML) INJECTION EVERY 12 HOURS SCHEDULED
Status: DISCONTINUED | OUTPATIENT
Start: 2024-02-08 | End: 2024-02-08 | Stop reason: HOSPADM

## 2024-02-08 RX ORDER — OXYCODONE HYDROCHLORIDE 5 MG/1
5 TABLET ORAL PRN
Status: DISCONTINUED | OUTPATIENT
Start: 2024-02-08 | End: 2024-02-08 | Stop reason: HOSPADM

## 2024-02-08 RX ORDER — POLYETHYLENE GLYCOL 3350 17 G/17G
17 POWDER, FOR SOLUTION ORAL DAILY
Status: DISCONTINUED | OUTPATIENT
Start: 2024-02-09 | End: 2024-02-09 | Stop reason: HOSPADM

## 2024-02-08 RX ORDER — MORPHINE SULFATE 2 MG/ML
2 INJECTION, SOLUTION INTRAMUSCULAR; INTRAVENOUS
Status: DISCONTINUED | OUTPATIENT
Start: 2024-02-08 | End: 2024-02-09

## 2024-02-08 RX ORDER — ACETAMINOPHEN 325 MG/1
650 TABLET ORAL EVERY 6 HOURS
Status: DISCONTINUED | OUTPATIENT
Start: 2024-02-08 | End: 2024-02-08

## 2024-02-08 RX ORDER — NORTRIPTYLINE HYDROCHLORIDE 25 MG/1
25 CAPSULE ORAL NIGHTLY
Status: DISCONTINUED | OUTPATIENT
Start: 2024-02-08 | End: 2024-02-09 | Stop reason: HOSPADM

## 2024-02-08 RX ORDER — ROCURONIUM BROMIDE 10 MG/ML
INJECTION, SOLUTION INTRAVENOUS PRN
Status: DISCONTINUED | OUTPATIENT
Start: 2024-02-08 | End: 2024-02-08 | Stop reason: SDUPTHER

## 2024-02-08 RX ORDER — SODIUM CHLORIDE 9 MG/ML
INJECTION, SOLUTION INTRAVENOUS PRN
Status: DISCONTINUED | OUTPATIENT
Start: 2024-02-08 | End: 2024-02-08 | Stop reason: HOSPADM

## 2024-02-08 RX ORDER — HYDROCODONE BITARTRATE AND ACETAMINOPHEN 7.5; 325 MG/1; MG/1
1 TABLET ORAL EVERY 6 HOURS PRN
Status: DISCONTINUED | OUTPATIENT
Start: 2024-02-08 | End: 2024-02-09

## 2024-02-08 RX ORDER — KETAMINE HCL IN NACL, ISO-OSM 100MG/10ML
SYRINGE (ML) INJECTION PRN
Status: DISCONTINUED | OUTPATIENT
Start: 2024-02-08 | End: 2024-02-08 | Stop reason: SDUPTHER

## 2024-02-08 RX ORDER — DIPHENHYDRAMINE HYDROCHLORIDE 50 MG/ML
25 INJECTION INTRAMUSCULAR; INTRAVENOUS EVERY 6 HOURS PRN
Status: DISCONTINUED | OUTPATIENT
Start: 2024-02-08 | End: 2024-02-09 | Stop reason: HOSPADM

## 2024-02-08 RX ORDER — SODIUM CHLORIDE 0.9 % (FLUSH) 0.9 %
5-40 SYRINGE (ML) INJECTION PRN
Status: DISCONTINUED | OUTPATIENT
Start: 2024-02-08 | End: 2024-02-08 | Stop reason: HOSPADM

## 2024-02-08 RX ORDER — FAMOTIDINE 10 MG/ML
20 INJECTION, SOLUTION INTRAVENOUS ONCE
Status: COMPLETED | OUTPATIENT
Start: 2024-02-08 | End: 2024-02-08

## 2024-02-08 RX ORDER — MELOXICAM 15 MG/1
15 TABLET ORAL DAILY
Qty: 30 TABLET | Refills: 1 | Status: SHIPPED | OUTPATIENT
Start: 2024-02-08

## 2024-02-08 RX ORDER — ACETAMINOPHEN 500 MG
1000 TABLET ORAL 3 TIMES DAILY PRN
Qty: 180 TABLET | Refills: 0 | Status: SHIPPED | OUTPATIENT
Start: 2024-02-08

## 2024-02-08 RX ORDER — SODIUM CHLORIDE 0.9 % (FLUSH) 0.9 %
5-40 SYRINGE (ML) INJECTION PRN
Status: DISCONTINUED | OUTPATIENT
Start: 2024-02-08 | End: 2024-02-09 | Stop reason: HOSPADM

## 2024-02-08 RX ORDER — KETOROLAC TROMETHAMINE 30 MG/ML
INJECTION, SOLUTION INTRAMUSCULAR; INTRAVENOUS
Status: DISPENSED
Start: 2024-02-08 | End: 2024-02-08

## 2024-02-08 RX ORDER — VANCOMYCIN HYDROCHLORIDE 1 G/20ML
INJECTION, POWDER, LYOPHILIZED, FOR SOLUTION INTRAVENOUS PRN
Status: DISCONTINUED | OUTPATIENT
Start: 2024-02-08 | End: 2024-02-08 | Stop reason: ALTCHOICE

## 2024-02-08 RX ORDER — MIDAZOLAM HYDROCHLORIDE 1 MG/ML
INJECTION INTRAMUSCULAR; INTRAVENOUS PRN
Status: DISCONTINUED | OUTPATIENT
Start: 2024-02-08 | End: 2024-02-08 | Stop reason: SDUPTHER

## 2024-02-08 RX ORDER — PREGABALIN 75 MG/1
150 CAPSULE ORAL 2 TIMES DAILY
Status: DISCONTINUED | OUTPATIENT
Start: 2024-02-08 | End: 2024-02-09 | Stop reason: HOSPADM

## 2024-02-08 RX ORDER — CEFAZOLIN SODIUM IN 0.9 % NACL 2 G/100 ML
2000 PLASTIC BAG, INJECTION (ML) INTRAVENOUS
Status: COMPLETED | OUTPATIENT
Start: 2024-02-08 | End: 2024-02-08

## 2024-02-08 RX ORDER — PROPOFOL 10 MG/ML
INJECTION, EMULSION INTRAVENOUS PRN
Status: DISCONTINUED | OUTPATIENT
Start: 2024-02-08 | End: 2024-02-08 | Stop reason: SDUPTHER

## 2024-02-08 RX ORDER — MIDAZOLAM HYDROCHLORIDE 1 MG/ML
INJECTION INTRAMUSCULAR; INTRAVENOUS
Status: COMPLETED
Start: 2024-02-08 | End: 2024-02-08

## 2024-02-08 RX ORDER — FENTANYL CITRATE 50 UG/ML
INJECTION, SOLUTION INTRAMUSCULAR; INTRAVENOUS PRN
Status: DISCONTINUED | OUTPATIENT
Start: 2024-02-08 | End: 2024-02-08 | Stop reason: SDUPTHER

## 2024-02-08 RX ORDER — HYDROMORPHONE HYDROCHLORIDE 2 MG/ML
INJECTION, SOLUTION INTRAMUSCULAR; INTRAVENOUS; SUBCUTANEOUS PRN
Status: DISCONTINUED | OUTPATIENT
Start: 2024-02-08 | End: 2024-02-08 | Stop reason: SDUPTHER

## 2024-02-08 RX ORDER — HYDROCODONE BITARTRATE AND ACETAMINOPHEN 5; 325 MG/1; MG/1
1 TABLET ORAL EVERY 6 HOURS PRN
Status: DISCONTINUED | OUTPATIENT
Start: 2024-02-08 | End: 2024-02-09

## 2024-02-08 RX ORDER — METHOCARBAMOL 500 MG/1
500 TABLET, FILM COATED ORAL EVERY 8 HOURS PRN
Status: DISCONTINUED | OUTPATIENT
Start: 2024-02-08 | End: 2024-02-09

## 2024-02-08 RX ORDER — DIPHENHYDRAMINE HCL 25 MG
25 TABLET ORAL EVERY 6 HOURS PRN
Status: DISCONTINUED | OUTPATIENT
Start: 2024-02-08 | End: 2024-02-09 | Stop reason: HOSPADM

## 2024-02-08 RX ORDER — OXYCODONE HYDROCHLORIDE 5 MG/1
5 TABLET ORAL EVERY 4 HOURS PRN
Status: DISCONTINUED | OUTPATIENT
Start: 2024-02-08 | End: 2024-02-08

## 2024-02-08 RX ORDER — LIDOCAINE HYDROCHLORIDE 10 MG/ML
1 INJECTION, SOLUTION EPIDURAL; INFILTRATION; INTRACAUDAL; PERINEURAL
Status: DISCONTINUED | OUTPATIENT
Start: 2024-02-08 | End: 2024-02-08 | Stop reason: HOSPADM

## 2024-02-08 RX ORDER — LIDOCAINE HYDROCHLORIDE 20 MG/ML
INJECTION, SOLUTION EPIDURAL; INFILTRATION; INTRACAUDAL; PERINEURAL PRN
Status: DISCONTINUED | OUTPATIENT
Start: 2024-02-08 | End: 2024-02-08 | Stop reason: SDUPTHER

## 2024-02-08 RX ORDER — ONDANSETRON 2 MG/ML
4 INJECTION INTRAMUSCULAR; INTRAVENOUS EVERY 6 HOURS PRN
Status: DISCONTINUED | OUTPATIENT
Start: 2024-02-08 | End: 2024-02-09 | Stop reason: HOSPADM

## 2024-02-08 RX ORDER — ASPIRIN 81 MG/1
81 TABLET ORAL 2 TIMES DAILY
Status: DISCONTINUED | OUTPATIENT
Start: 2024-02-09 | End: 2024-02-09 | Stop reason: HOSPADM

## 2024-02-08 RX ORDER — DULOXETIN HYDROCHLORIDE 60 MG/1
60 CAPSULE, DELAYED RELEASE ORAL DAILY
Status: DISCONTINUED | OUTPATIENT
Start: 2024-02-08 | End: 2024-02-09 | Stop reason: HOSPADM

## 2024-02-08 RX ORDER — SODIUM CHLORIDE, SODIUM LACTATE, POTASSIUM CHLORIDE, CALCIUM CHLORIDE 600; 310; 30; 20 MG/100ML; MG/100ML; MG/100ML; MG/100ML
INJECTION, SOLUTION INTRAVENOUS CONTINUOUS
Status: DISCONTINUED | OUTPATIENT
Start: 2024-02-08 | End: 2024-02-09 | Stop reason: HOSPADM

## 2024-02-08 RX ORDER — DEXAMETHASONE SODIUM PHOSPHATE 4 MG/ML
8 INJECTION, SOLUTION INTRA-ARTICULAR; INTRALESIONAL; INTRAMUSCULAR; INTRAVENOUS; SOFT TISSUE ONCE
Status: DISCONTINUED | OUTPATIENT
Start: 2024-02-08 | End: 2024-02-08 | Stop reason: HOSPADM

## 2024-02-08 RX ORDER — BUPIVACAINE HYDROCHLORIDE AND EPINEPHRINE 2.5; 5 MG/ML; UG/ML
INJECTION, SOLUTION EPIDURAL; INFILTRATION; INTRACAUDAL; PERINEURAL
Status: COMPLETED | OUTPATIENT
Start: 2024-02-08 | End: 2024-02-08

## 2024-02-08 RX ORDER — CELECOXIB 400 MG/1
400 CAPSULE ORAL ONCE
Status: COMPLETED | OUTPATIENT
Start: 2024-02-08 | End: 2024-02-08

## 2024-02-08 RX ORDER — MORPHINE SULFATE 2 MG/ML
2 INJECTION, SOLUTION INTRAMUSCULAR; INTRAVENOUS EVERY 10 MIN PRN
Status: COMPLETED | OUTPATIENT
Start: 2024-02-08 | End: 2024-02-08

## 2024-02-08 RX ORDER — ONDANSETRON 4 MG/1
4 TABLET, FILM COATED ORAL 3 TIMES DAILY PRN
Qty: 15 TABLET | Refills: 0 | Status: SHIPPED | OUTPATIENT
Start: 2024-02-08

## 2024-02-08 RX ORDER — LIDOCAINE HYDROCHLORIDE 10 MG/ML
0.3 INJECTION, SOLUTION EPIDURAL; INFILTRATION; INTRACAUDAL; PERINEURAL
Status: DISCONTINUED | OUTPATIENT
Start: 2024-02-08 | End: 2024-02-08 | Stop reason: HOSPADM

## 2024-02-08 RX ORDER — HYDROCODONE BITARTRATE AND ACETAMINOPHEN 7.5; 325 MG/1; MG/1
1 TABLET ORAL 3 TIMES DAILY
Status: DISCONTINUED | OUTPATIENT
Start: 2024-02-08 | End: 2024-02-09

## 2024-02-08 RX ORDER — OXYCODONE HYDROCHLORIDE 5 MG/1
10 TABLET ORAL PRN
Status: DISCONTINUED | OUTPATIENT
Start: 2024-02-08 | End: 2024-02-08 | Stop reason: HOSPADM

## 2024-02-08 RX ORDER — SODIUM CHLORIDE, SODIUM LACTATE, POTASSIUM CHLORIDE, CALCIUM CHLORIDE 600; 310; 30; 20 MG/100ML; MG/100ML; MG/100ML; MG/100ML
INJECTION, SOLUTION INTRAVENOUS CONTINUOUS PRN
Status: DISCONTINUED | OUTPATIENT
Start: 2024-02-08 | End: 2024-02-08 | Stop reason: SDUPTHER

## 2024-02-08 RX ORDER — MAGNESIUM HYDROXIDE 1200 MG/15ML
LIQUID ORAL CONTINUOUS PRN
Status: COMPLETED | OUTPATIENT
Start: 2024-02-08 | End: 2024-02-08

## 2024-02-08 RX ORDER — MORPHINE SULFATE 4 MG/ML
4 INJECTION, SOLUTION INTRAMUSCULAR; INTRAVENOUS
Status: DISCONTINUED | OUTPATIENT
Start: 2024-02-08 | End: 2024-02-09

## 2024-02-08 RX ORDER — ONDANSETRON 2 MG/ML
4 INJECTION INTRAMUSCULAR; INTRAVENOUS EVERY 30 MIN PRN
Status: DISCONTINUED | OUTPATIENT
Start: 2024-02-08 | End: 2024-02-08 | Stop reason: HOSPADM

## 2024-02-08 RX ORDER — OXYCODONE HYDROCHLORIDE 5 MG/1
10 TABLET ORAL EVERY 4 HOURS PRN
Status: DISCONTINUED | OUTPATIENT
Start: 2024-02-08 | End: 2024-02-08

## 2024-02-08 RX ORDER — MAGNESIUM SULFATE IN WATER 40 MG/ML
2000 INJECTION, SOLUTION INTRAVENOUS ONCE
Status: COMPLETED | OUTPATIENT
Start: 2024-02-08 | End: 2024-02-08

## 2024-02-08 RX ORDER — ACETAMINOPHEN 500 MG
1000 TABLET ORAL ONCE
Status: COMPLETED | OUTPATIENT
Start: 2024-02-08 | End: 2024-02-08

## 2024-02-08 RX ORDER — SODIUM CHLORIDE, SODIUM LACTATE, POTASSIUM CHLORIDE, CALCIUM CHLORIDE 600; 310; 30; 20 MG/100ML; MG/100ML; MG/100ML; MG/100ML
INJECTION, SOLUTION INTRAVENOUS CONTINUOUS
Status: DISCONTINUED | OUTPATIENT
Start: 2024-02-08 | End: 2024-02-08 | Stop reason: HOSPADM

## 2024-02-08 RX ORDER — SODIUM CHLORIDE 0.9 % (FLUSH) 0.9 %
5-40 SYRINGE (ML) INJECTION EVERY 12 HOURS SCHEDULED
Status: DISCONTINUED | OUTPATIENT
Start: 2024-02-08 | End: 2024-02-09 | Stop reason: HOSPADM

## 2024-02-08 RX ORDER — OXYCODONE HYDROCHLORIDE 5 MG/1
5 TABLET ORAL EVERY 6 HOURS PRN
Qty: 28 TABLET | Refills: 0 | Status: SHIPPED | OUTPATIENT
Start: 2024-02-08 | End: 2024-02-12 | Stop reason: SDUPTHER

## 2024-02-08 RX ORDER — KETOROLAC TROMETHAMINE 30 MG/ML
30 INJECTION, SOLUTION INTRAMUSCULAR; INTRAVENOUS ONCE
Status: COMPLETED | OUTPATIENT
Start: 2024-02-08 | End: 2024-02-08

## 2024-02-08 RX ORDER — ONDANSETRON 4 MG/1
4 TABLET, ORALLY DISINTEGRATING ORAL EVERY 8 HOURS PRN
Status: DISCONTINUED | OUTPATIENT
Start: 2024-02-08 | End: 2024-02-09 | Stop reason: HOSPADM

## 2024-02-08 RX ORDER — MELOXICAM 15 MG/1
15 TABLET ORAL DAILY
Status: ON HOLD | COMMUNITY
End: 2024-02-09 | Stop reason: HOSPADM

## 2024-02-08 RX ORDER — DEXAMETHASONE SODIUM PHOSPHATE 4 MG/ML
INJECTION, SOLUTION INTRA-ARTICULAR; INTRALESIONAL; INTRAMUSCULAR; INTRAVENOUS; SOFT TISSUE PRN
Status: DISCONTINUED | OUTPATIENT
Start: 2024-02-08 | End: 2024-02-08 | Stop reason: SDUPTHER

## 2024-02-08 RX ORDER — CEFAZOLIN SODIUM IN 0.9 % NACL 2 G/100 ML
2000 PLASTIC BAG, INJECTION (ML) INTRAVENOUS EVERY 8 HOURS
Status: COMPLETED | OUTPATIENT
Start: 2024-02-08 | End: 2024-02-09

## 2024-02-08 RX ORDER — SODIUM CHLORIDE 9 MG/ML
INJECTION, SOLUTION INTRAVENOUS PRN
Status: DISCONTINUED | OUTPATIENT
Start: 2024-02-08 | End: 2024-02-09 | Stop reason: HOSPADM

## 2024-02-08 RX ORDER — ONDANSETRON 2 MG/ML
INJECTION INTRAMUSCULAR; INTRAVENOUS PRN
Status: DISCONTINUED | OUTPATIENT
Start: 2024-02-08 | End: 2024-02-08 | Stop reason: SDUPTHER

## 2024-02-08 RX ADMIN — MORPHINE SULFATE 2 MG: 2 INJECTION, SOLUTION INTRAMUSCULAR; INTRAVENOUS at 10:45

## 2024-02-08 RX ADMIN — HYDROMORPHONE HYDROCHLORIDE 0.5 MG: 1 INJECTION, SOLUTION INTRAMUSCULAR; INTRAVENOUS; SUBCUTANEOUS at 10:17

## 2024-02-08 RX ADMIN — HYDROMORPHONE HYDROCHLORIDE 0.5 MG: 2 INJECTION, SOLUTION INTRAMUSCULAR; INTRAVENOUS; SUBCUTANEOUS at 09:12

## 2024-02-08 RX ADMIN — Medication 50 MG: at 09:02

## 2024-02-08 RX ADMIN — HYDROCODONE BITARTRATE AND ACETAMINOPHEN 1 TABLET: 7.5; 325 TABLET ORAL at 20:22

## 2024-02-08 RX ADMIN — ACETAMINOPHEN 1000 MG: 500 TABLET ORAL at 07:29

## 2024-02-08 RX ADMIN — MORPHINE SULFATE 2 MG: 2 INJECTION, SOLUTION INTRAMUSCULAR; INTRAVENOUS at 12:28

## 2024-02-08 RX ADMIN — ROCURONIUM BROMIDE 50 MG: 50 INJECTION, SOLUTION INTRAVENOUS at 08:38

## 2024-02-08 RX ADMIN — Medication 2000 MG: at 08:38

## 2024-02-08 RX ADMIN — MORPHINE SULFATE 2 MG: 2 INJECTION, SOLUTION INTRAMUSCULAR; INTRAVENOUS at 10:55

## 2024-02-08 RX ADMIN — DEXAMETHASONE SODIUM PHOSPHATE 8 MG: 4 INJECTION, SOLUTION INTRAMUSCULAR; INTRAVENOUS at 08:38

## 2024-02-08 RX ADMIN — KETOROLAC TROMETHAMINE 30 MG: 30 INJECTION INTRAMUSCULAR; INTRAVENOUS at 10:27

## 2024-02-08 RX ADMIN — BUPIVACAINE HYDROCHLORIDE AND EPINEPHRINE BITARTRATE 20 ML: 2.5; .005 INJECTION, SOLUTION EPIDURAL; INFILTRATION; INTRACAUDAL; PERINEURAL at 07:45

## 2024-02-08 RX ADMIN — MORPHINE SULFATE 2 MG: 2 INJECTION, SOLUTION INTRAMUSCULAR; INTRAVENOUS at 11:06

## 2024-02-08 RX ADMIN — HYDROMORPHONE HYDROCHLORIDE 0.5 MG: 2 INJECTION, SOLUTION INTRAMUSCULAR; INTRAVENOUS; SUBCUTANEOUS at 09:56

## 2024-02-08 RX ADMIN — MAGNESIUM SULFATE IN WATER 2000 MG: 40 INJECTION, SOLUTION INTRAVENOUS at 09:02

## 2024-02-08 RX ADMIN — MIDAZOLAM 2 MG: 1 INJECTION INTRAMUSCULAR; INTRAVENOUS at 07:59

## 2024-02-08 RX ADMIN — HYDROCODONE BITARTRATE AND ACETAMINOPHEN 1 TABLET: 7.5; 325 TABLET ORAL at 16:22

## 2024-02-08 RX ADMIN — OXYCODONE 10 MG: 5 TABLET ORAL at 10:42

## 2024-02-08 RX ADMIN — HYDROMORPHONE HYDROCHLORIDE 0.5 MG: 2 INJECTION, SOLUTION INTRAMUSCULAR; INTRAVENOUS; SUBCUTANEOUS at 09:02

## 2024-02-08 RX ADMIN — Medication 2000 MG: at 16:27

## 2024-02-08 RX ADMIN — Medication 10 ML: at 20:23

## 2024-02-08 RX ADMIN — PROPOFOL 200 MG: 10 INJECTION, EMULSION INTRAVENOUS at 08:38

## 2024-02-08 RX ADMIN — CELECOXIB 400 MG: 400 CAPSULE ORAL at 07:29

## 2024-02-08 RX ADMIN — MORPHINE SULFATE 2 MG: 2 INJECTION, SOLUTION INTRAMUSCULAR; INTRAVENOUS at 10:35

## 2024-02-08 RX ADMIN — HYDROMORPHONE HYDROCHLORIDE 0.5 MG: 1 INJECTION, SOLUTION INTRAMUSCULAR; INTRAVENOUS; SUBCUTANEOUS at 10:10

## 2024-02-08 RX ADMIN — TRANEXAMIC ACID 1000 MG: 100 INJECTION, SOLUTION INTRAVENOUS at 08:38

## 2024-02-08 RX ADMIN — MIDAZOLAM HYDROCHLORIDE 2 MG: 1 INJECTION INTRAMUSCULAR; INTRAVENOUS at 08:36

## 2024-02-08 RX ADMIN — METHOCARBAMOL 500 MG: 500 TABLET ORAL at 17:39

## 2024-02-08 RX ADMIN — PREGABALIN 150 MG: 75 CAPSULE ORAL at 20:22

## 2024-02-08 RX ADMIN — ONDANSETRON 4 MG: 2 INJECTION INTRAMUSCULAR; INTRAVENOUS at 08:36

## 2024-02-08 RX ADMIN — HYDROMORPHONE HYDROCHLORIDE 0.5 MG: 2 INJECTION, SOLUTION INTRAMUSCULAR; INTRAVENOUS; SUBCUTANEOUS at 09:48

## 2024-02-08 RX ADMIN — DULOXETINE HYDROCHLORIDE 60 MG: 60 CAPSULE, DELAYED RELEASE ORAL at 16:22

## 2024-02-08 RX ADMIN — SODIUM CHLORIDE, SODIUM LACTATE, POTASSIUM CHLORIDE, AND CALCIUM CHLORIDE: .6; .31; .03; .02 INJECTION, SOLUTION INTRAVENOUS at 08:36

## 2024-02-08 RX ADMIN — FAMOTIDINE 20 MG: 10 INJECTION, SOLUTION INTRAVENOUS at 07:29

## 2024-02-08 RX ADMIN — MORPHINE SULFATE 4 MG: 4 INJECTION, SOLUTION INTRAMUSCULAR; INTRAVENOUS at 21:43

## 2024-02-08 RX ADMIN — SUGAMMADEX 200 MG: 100 INJECTION, SOLUTION INTRAVENOUS at 09:51

## 2024-02-08 RX ADMIN — FENTANYL CITRATE 100 MCG: 50 INJECTION, SOLUTION INTRAMUSCULAR; INTRAVENOUS at 08:36

## 2024-02-08 RX ADMIN — SODIUM CHLORIDE: 9 INJECTION, SOLUTION INTRAVENOUS at 16:26

## 2024-02-08 RX ADMIN — LIDOCAINE HYDROCHLORIDE 60 MG: 20 INJECTION, SOLUTION EPIDURAL; INFILTRATION; INTRACAUDAL; PERINEURAL at 08:38

## 2024-02-08 ASSESSMENT — PAIN DESCRIPTION - ORIENTATION
ORIENTATION: RIGHT

## 2024-02-08 ASSESSMENT — PAIN SCALES - GENERAL
PAINLEVEL_OUTOF10: 7
PAINLEVEL_OUTOF10: 4
PAINLEVEL_OUTOF10: 10
PAINLEVEL_OUTOF10: 5
PAINLEVEL_OUTOF10: 8
PAINLEVEL_OUTOF10: 8
PAINLEVEL_OUTOF10: 9
PAINLEVEL_OUTOF10: 6
PAINLEVEL_OUTOF10: 7
PAINLEVEL_OUTOF10: 8
PAINLEVEL_OUTOF10: 5
PAINLEVEL_OUTOF10: 8
PAINLEVEL_OUTOF10: 5

## 2024-02-08 ASSESSMENT — PAIN DESCRIPTION - DESCRIPTORS
DESCRIPTORS: ACHING;THROBBING
DESCRIPTORS: ACHING;PRESSURE
DESCRIPTORS: SPASM
DESCRIPTORS: ACHING

## 2024-02-08 ASSESSMENT — PAIN DESCRIPTION - LOCATION
LOCATION: KNEE
LOCATION: KNEE
LOCATION: KNEE;HEAD
LOCATION: KNEE
LOCATION: KNEE;LEG
LOCATION: KNEE

## 2024-02-08 ASSESSMENT — LIFESTYLE VARIABLES: HOW OFTEN DO YOU HAVE A DRINK CONTAINING ALCOHOL: NEVER

## 2024-02-08 ASSESSMENT — PAIN DESCRIPTION - PAIN TYPE: TYPE: SURGICAL PAIN

## 2024-02-08 ASSESSMENT — PAIN - FUNCTIONAL ASSESSMENT: PAIN_FUNCTIONAL_ASSESSMENT: PREVENTS OR INTERFERES SOME ACTIVE ACTIVITIES AND ADLS

## 2024-02-08 NOTE — ANESTHESIA PRE PROCEDURE
Department of Anesthesiology  Preprocedure Note       Name:  Dorinda Sánchez   Age:  53 y.o.  :  1970                                          MRN:  [id number]         Date:  2024      Surgeon: Surgeon(s):  Arnulfo Diop MD    Procedure: Procedure(s):  RIGHT TOTAL KNEE ARTHROPLASTY                ASHLEY NOTE:  ADDUCTOR CANAL BLOCK    Medications prior to admission:   Prior to Admission medications    Medication Sig Start Date End Date Taking? Authorizing Provider   DULoxetine (CYMBALTA) 60 MG extended release capsule Take 1 capsule by mouth daily 24   Josh Tay MD   HYDROcodone-acetaminophen (NORCO) 7.5-325 MG per tablet Take 1 tablet by mouth 3 times daily as needed for Pain for up to 35 days. 24  Josh Tay MD   meloxicam (MOBIC) 15 MG tablet Take 1 tablet by mouth daily 24   Josh Tay MD   nortriptyline (PAMELOR) 25 MG capsule Take 1-2 capsules by mouth nightly 24   Josh Tay MD   pregabalin (LYRICA) 150 MG capsule Take 1 capsule by mouth 2 times daily for 60 days.  Patient taking differently: Take 1 capsule by mouth daily. 1/25/24 3/25/24  Josh Tay MD   fluticasone (FLONASE) 50 MCG/ACT nasal spray 2 sprays by Nasal route daily    Provider, MD Pacheco       Current medications:    Current Facility-Administered Medications   Medication Dose Route Frequency Provider Last Rate Last Admin   • lidocaine PF 1 % injection 1 mL  1 mL IntraDERmal Once PRN Drew Langford MD       • acetaminophen (TYLENOL) tablet 1,000 mg  1,000 mg Oral Once Drew Langford MD       • celecoxib (CELEBREX) capsule 400 mg  400 mg Oral Once Drew Langford MD       • lactated ringers IV soln infusion   IntraVENous Continuous Drew Langford MD       • sodium chloride flush 0.9 % injection 5-40 mL  5-40 mL IntraVENous 2 times per day Drew Langford MD       • sodium chloride flush 0.9 % injection 5-40 mL  5-40

## 2024-02-08 NOTE — PROGRESS NOTES
Physical Therapy    Attempted to see pt in PACU, spoke with RN and pt and pt is now being admitted due to high levels of pain. Pt agreeable to try pt later today depending on when she get a room on C[facility]. Will follow up at a later time/date as pt is appropriate and schedule permits. Thank you.  Margaret Greene PT, DPT

## 2024-02-08 NOTE — H&P
Update History & Physical    The patient's History and Physical of February 2, 2024 was reviewed with the patient and I examined the patient. There was no change. The surgical site was confirmed by the patient and me.       Plan: The risks, benefits, expected outcome, and alternative to the recommended procedure have been discussed with the patient. Patient understands and wants to proceed with the procedure.     Electronically signed by Arnulfo Diop MD on 2/8/2024 at 6:37 AM

## 2024-02-08 NOTE — PROGRESS NOTES
Dilaudid does not seem to be affecting patients pain level, anesthesia notified.  New orders for Toradol and Morphine received, dilaudid discontinued.

## 2024-02-08 NOTE — ANESTHESIA PROCEDURE NOTES
Peripheral Block    Patient location during procedure: pre-op  Reason for block: post-op pain management and at surgeon's request  Start time: 2/8/2024 7:45 AM  End time: 2/8/2024 7:55 AM  Staffing  Performed: anesthesiologist   Anesthesiologist: Binu Teague MD  Performed by: Binu Teague MD  Authorized by: Binu Teague MD    Preanesthetic Checklist  Completed: IV checked, timeout performed and anesthesia consent given  Peripheral Block   Patient position: supine  Prep: ChloraPrep  Provider prep: sterile gloves  Patient monitoring: responsive to questions  Block type: Femoral  Adductor canal  Laterality: right  Injection technique: single-shot  Guidance: ultrasound guided    Needle   Needle type: short-bevel   Needle gauge: 20 G  Needle localization: ultrasound guidance  Needle length: 10 cm  Assessment   Injection assessment: negative aspiration for heme, no paresthesia on injection, local visualized surrounding nerve on ultrasound and no intravascular symptoms  Paresthesia pain: none  Slow fractionated injection: yes  Hemodynamics: stable  Real-time US image taken/store: yes  Outcomes: patient tolerated procedure well    Medications Administered  BUPivacaine 0.25%-EPINEPHrine injection [id number] - Perineural   20 mL - 2/8/2024 7:45:00 AM

## 2024-02-08 NOTE — OP NOTE
Operative Note      Patient: Dorinda Sánchez  YOB: 1970  MRN: [id number]    Date of Procedure: 2/8/2024    Pre-Op Diagnosis Codes:     * Primary osteoarthritis of right knee [M17.11]    Post-Op Diagnosis: Same       Procedure(s):  RIGHT TOTAL KNEE ARTHROPLASTY    Surgeon(s):  Arnulfo Diop MD    Assistant:   Surgical Assistant: Aneesh Pedro RN; Fito Anne    Anesthesia: General    Estimated Blood Loss (mL): less than 100     Complications: None    Specimens:   * No specimens in log *    Implants:  Implant Name Type Inv. Item Serial No.  Lot No. LRB No. Used Action   BASEPLATE TIB KEELED 0 DEG E RT KNEE SPIKE OSSEOTI PERSONA - [id number]  BASEPLATE TIB KEELED 0 DEG E RT KNEE SPIKE OSSEOTI PERSONA  [employer]- [id number] Right 1 Implanted   PSN MC VE ASF R 12MM 6-7/EF - [id number]  PSN MC VE ASF R 12MM 6-7/EF  [employer]- [id number] Right 1 Implanted   COMPONENT FEM CR HARITHA 6 RT KNEE JONAH COCR STRL PERSONA LTX - [id number]  COMPONENT FEM CR HARITHA 6 RT KNEE JONAH COCR STRL PERSONA LTX  [employer]- [id number] Right 1 Implanted         Drains: * No LDAs found *    Findings: severe medial OA    Detailed Description of Procedure:   Clinical Indications: This is a 52 y/o F that was evaluated for R knee OA by myself, noted to have failed extensive conservative measures and desired to have surgical intervention. Risks benefits limitations and alternatives to R TKA were discussed with the patient who wished to proceed.     The patient was met in the preoperative holding area last-minute questions were answered, the R knee was marked, consent was verified. An adductor canal block was performed per anesthesia. The patient was taken to the OR where general anesthesia was administered. They were positioned in the supine position.  The operative extremity was prepped and draped in standard orthopedic fashion. Weight based Ancef and 1 g TXA were

## 2024-02-08 NOTE — PLAN OF CARE
Problem: Discharge Planning  Goal: Discharge to home or other facility with appropriate resources  Outcome: Progressing  Flowsheets (Taken 2/8/2024 1532 by Trinity Kelly, RN)  Discharge to home or other facility with appropriate resources: Identify barriers to discharge with patient and caregiver     Problem: Pain  Goal: Verbalizes/displays adequate comfort level or baseline comfort level  Outcome: Progressing     Problem: Safety - Adult  Goal: Free from fall injury  Outcome: Progressing     Problem: ABCDS Injury Assessment  Goal: Absence of physical injury  Outcome: Progressing

## 2024-02-08 NOTE — PROGRESS NOTES
Physical Therapy Evaluation and Treatment    Name: Dorinda Sánchez YOB: 1970  MRN: [id number]  Date of Evaluation: 2/8/2024     Patient Diagnosis(es): The primary encounter diagnosis was S/P total knee arthroplasty, right. Diagnoses of Chronic pain syndrome, Right hand pain, and Trochanteric bursitis of both hips were also pertinent to this visit.  Past Medical History:  has a past medical history of Chronic pain, Colitis, DDD (degenerative disc disease), lumbar, Failed back surgical syndrome, Fibromyalgia, Mood disorder (HCC), Panic attacks, Primary insomnia, and Seasonal allergies.  Past Surgical History:  has a past surgical history that includes Foot surgery (Right); Urethra surgery; Colonoscopy; back surgery; Esophagogastroduodenoscopy; Endometrial ablation; joint replacement; and Knee arthroscopy (Right, 6/13/2023).    AM-PAC score  [facility] 6 Clicks Inpatient Mobility:  AM-PAC Basic Mobility - Inpatient   How much help is needed turning from your back to your side while in a flat bed without using bedrails?: None  How much help is needed moving from lying on your back to sitting on the side of a flat bed without using bedrails?: A Little  How much help is needed moving to and from a bed to a chair?: A Little  How much help is needed standing up from a chair using your arms?: A Little  How much help is needed walking in hospital room?: A Little  How much help is needed climbing 3-5 steps with a railing?: A Little  [facility] Inpatient Mobility Raw Score : 19  AM-PAC Inpatient T-Scale Score : 45.44  Mobility Inpatient CMS 0-100% Score: 41.77  Mobility Inpatient CMS G-Code Modifier : CK      Referring Provider:    Surgery: Right, Total Knee Arthroplasty    Restrictions: Full Weight Bearing as Tolerated, No ROM Restrictions, IV    Assessment:   Pt seen for PT evaluation POD#0 s/p Right, Total Knee Arthroplasty. At baseline, pt lives with her  in split level house and performs functional mobility  independently. Pt currently requires CGA for bed mobility, CGA for transfers, CGA for ambulation. Pt limited by pain this date and no able to trial stairs this date. Pt demos good balance and quad control with all mobility. Pt will continue to benefit from skilled PT services to address current deficits. And for stair training. It is anticipated pt will be safe to DC home with 24/7 assist when deemed medically appropriate.     Impairments noted: Impaired functional mobility, Pain limiting function, Impaired strength, and Impaired ROM    Discharge Recommendations: Home with 24-hr supervision/assist  DME Recommendations: Rolling Walker  Barriers to discharge: 7 Steps     PT Diagnosis: Impaired mobility post-op R TKA  Complexity: MEDIUM     Subjective:   Pt in bed upon arrival, RN cleared for therapy. Pt pleasant and agreeable     Pain     Pain: Yes  Location: R knee  Ratin/10  Non-pharmaceutical interventions: Ice applied, Repositioned for comfort, and Pt received pain meds prior to session    Discharge Environment:    Pt. Lives with spouse and 24/7 Assist Available   Home environment:  split level home and 7 steps between floors  Steps to enter first floor: N/A  Steps to second floor: Partial flight and hand rail: unilateral  Bathroom: tub/shower unit and standard height commode  Equipment owned: [facility]    Prior Level of Function:  Pt. Able to drive: Yes  Pt Fully independent with ADLs: Yes  Pt. Required assistance from family for: Independent PTA  Pt. independent for transfers and gait and walked with No Device  History of falls No    Objective:  Vitals: BP: 126/79, HR: 100 bpm, SpO2: 97% on 3L    Strength/ROM:  RLE: Not formally assessed due to surgical limb, but appears functional  LLE: Within Normal Limits, 5/5     Sensations:  RLE: Intact  LLE: Intact    Functional Mobility:  Bed Mobility: Pt performed bed mobility with CGA with use of Bedrails. Pt required verbal cues for sequencing, verbal/Tactile cues for

## 2024-02-08 NOTE — PROGRESS NOTES
Preprocedure completed, see epic. IV placed, consents signed. Mepelix to coccyx, warming blanket and warming fluids, IS completed/educated. Belongings collected and labeled.

## 2024-02-08 NOTE — DISCHARGE INSTRUCTIONS
*** Please contact Janeth Oswald Orthopaedic Nurse Navigator with any questions or concerns after your discharge.*** Mon- Fri 9am- 5pm [phone]. If you have any issues or concerns after 5pm or on the weekend please call your surgeon's office. I will be contacting you in a few days to follow up.    If you need a pain medication refill please contact your surgeon's office.      Please contact Janeth Oswald Orthopaedic Nurse Navigator with any questions or concerns after your discharge. Mon- Fri 9am- 5pm [phone]. If you have any issues or concerns after 5pm or on the weekend please call your surgeon's office. I will be contacting you in a few days to follow up.    If you need a pain medication refill please contact your surgeon's office.        Dr. Diop Total Knee Replacement  Discharge Instructions      Activity: The first week after surgery is all about Ice, Elevation and Rest!        [place] way to elevate knee above heart, while keeping the knee straight.     Pillows should be placed under the FOOT and leg, such that the leg is held straight.   You should feel stretching in the back of the knee. If there is too much pain add pillows under the knee, but the leg should be as straight as possible  Placing pillows under the knee only and keeping the knee bent will make it very difficult to get it straight with physical therapy.  Use a walker when ambulating.    Physical therapy.   Typically starts 10-14 days after surgery unless otherwise instructed  Referral placed to outpatient location discussed with surgeon or for home PT if you do not have transportation for outpatient PT    To prevent Clot formation, you have been placed on the following medication:  Aspirin 81mg twice daily for 30 days    Surgical Site Care:  Keep dressing in place until follow up visit, Call the office if drainage  Showering is permitted with waterproof Mepilex dressing   Will remove dressing at first follow up

## 2024-02-08 NOTE — PROGRESS NOTES
Time out performed with Dr. Teague for right adductor canal nerve block. Patient placed on telemetry, continuous pulse oximetry, and 2L NC for the procedure. BP cycled every five minutes. Cardiac rhythm is NSR. Patient tolerated well, VSS stable throughout. Will continue to monitor VS every 15 minutes post procedure. Side rails in place, call light within reach, once alert patient instructed to press call button if assistance is need, verbalized understanding.

## 2024-02-08 NOTE — CONSULTS
V2.0  [facility] [place]      Name:  Dorinda Sánchez /Age/Sex: 1970  (53 y.o. female)   MRN & CSN:  [id number] & [id number] Encounter Date/Time: 2024 3:30 PM EST   Location:   PCP: Funmilayo Ruiz DO     Attending:Arnulfo Diop MD  Consulting Provider: Wily Vines MD      Hospital Day: 1    Assessment and Recommendations   Dorinda Sánchez is a 53 y.o. female with pmh of insomnia, fibromyalgia who presents with S/P total knee arthroplasty, right    Hospital Problems             Last Modified POA    * (Principal) S/P total knee arthroplasty, right 2024 Yes       Recommendations:    Right knee osteoarthritis status post TKR postop day 1 tolerated the procedure well no acute events.  Appropriate pain regimen in place.  PT OT on board.  Orthopedic surgery is primary  Fibromyalgia on duloxetine which will be continued  History of insomnia  Peripheral neuropathy on Lyrica which will be resumed        Comment: Please note this report has been produced using speech recognition software and may contain errors related to that system including errors in grammar, punctuation, and spelling, as well as words and phrases that may be inappropriate. If there are any questions or concerns please feel free to contact the dictating provider for clarification.     Diet ADULT DIET; Regular   DVT Prophylaxis [] Lovenox, []  Heparin, [] SCDs, [] Ambulation,  [] Eliquis, [] Xarelto   Code Status Full Code   Surrogate Decision Maker/ POA     Hi  History Obtained From:    patient    History of Present Illness:     Chief Complaint: S/P total knee arthroplasty, right    Patient presented to the hospital for elective right knee osteoarthritis TKR tolerated the procedure well.  Elevated to the hospital for the monitoring from medical standpoint.  Denies any nausea vomiting diarrhea constipation dizziness R leg pain leg swelling.  Does have swelling around the surgery site.  Communicated on consult at bedside  IntraVENous Q8H    [START ON 2/9/2024] aspirin  81 mg Oral BID    [START ON 2/9/2024] polyethylene glycol  17 g Oral Daily      Infusions:    sodium chloride      lactated ringers IV soln       PRN Meds: ketorolac, ,   sodium chloride flush, 5-40 mL, PRN  sodium chloride, , PRN  oxyCODONE, 5 mg, Q4H PRN   Or  oxyCODONE, 10 mg, Q4H PRN  morphine, 2 mg, Q2H PRN   Or  morphine, 4 mg, Q2H PRN  methocarbamol, 500 mg, Q8H PRN  diphenhydrAMINE, 25 mg, Q6H PRN   Or  diphenhydrAMINE, 25 mg, Q6H PRN  ondansetron, 4 mg, Q8H PRN   Or  ondansetron, 4 mg, Q6H PRN        Labs and Imaging   XR KNEE RIGHT (1-2 VIEWS)    Result Date: 2/8/2024  EXAMINATION: 2 XRAY VIEWS OF THE RIGHT KNEE 2/8/2024 10:07 am COMPARISON: 01/03/2024 radiograph HISTORY: ORDERING SYSTEM PROVIDED HISTORY: post op AP and lat TECHNOLOGIST PROVIDED HISTORY: Reason for exam:->post op AP and lat Reason for Exam: post op AP and lat FINDINGS: Recent right total knee arthroplasty.  Orthopedic hardware in good position and alignment.  Edema and air anteriorly at the operative site.  No other significant finding.     Expected postoperative appearance following right TKA.       CBC: No results for input(s): \"WBC\", \"HGB\", \"PLT\" in the last 72 hours.  BMP:  No results for input(s): \"NA\", \"K\", \"CL\", \"CO2\", \"BUN\", \"CREATININE\", \"GLUCOSE\" in the last 72 hours.  Hepatic: No results for input(s): \"AST\", \"ALT\", \"ALB\", \"BILITOT\", \"ALKPHOS\" in the last 72 hours.  Lipids: No results found for: \"CHOL\", \"HDL\", \"TRIG\"  Hemoglobin A1C: No results found for: \"LABA1C\"  TSH:   Lab Results   Component Value Date/Time    TSH 0.75 09/15/2018 11:00 AM     Troponin: No results found for: \"TROPONINT\"  Lactic Acid: No results for input(s): \"LACTA\" in the last 72 hours.  BNP: No results for input(s): \"PROBNP\" in the last 72 hours.  UA:No results found for: \"NITRU\", \"COLORU\", \"PHUR\", \"LABCAST\", \"WBCUA\", \"RBCUA\", \"MUCUS\", \"TRICHOMONAS\", \"YEAST\", \"BACTERIA\", \"CLARITYU\", \"SPECGRAV\", \"LEUKOCYTESUR\",

## 2024-02-08 NOTE — ANESTHESIA POSTPROCEDURE EVALUATION
Department of Anesthesiology  Postprocedure Note    Patient: Dorinda Sánchez  MRN: [id number]  YOB: 1970  Date of evaluation: 2/8/2024    Procedure Summary       Date: 02/08/24 Room / Location: [address]    Anesthesia Start: 0836 Anesthesia Stop: 1010    Procedure: RIGHT TOTAL KNEE ARTHROPLASTY (Right: Knee) Diagnosis:       Primary osteoarthritis of right knee      (Primary osteoarthritis of right knee [M17.11])    Surgeons: Arnulfo Diop MD Responsible Provider: Binu Teague MD    Anesthesia Type: general ASA Status: 3            Anesthesia Type: No value filed.    Wilver Phase I: Wilver Score: 9    Wilver Phase II:      Anesthesia Post Evaluation    Patient location during evaluation: PACU  Level of consciousness: awake  Airway patency: patent  Nausea & Vomiting: no vomiting  Cardiovascular status: blood pressure returned to baseline  Respiratory status: acceptable  Hydration status: stable  Pain management: adequate    No notable events documented.

## 2024-02-08 NOTE — PROGRESS NOTES
4 Eyes Skin Assessment     NAME:  Dorinda Sánchez  YOB: 1970  MEDICAL RECORD NUMBER:  [id number]    The patient is being assessed for  Admission    I agree that at least one RN has performed a thorough Head to Toe Skin Assessment on the patient. ALL assessment sites listed below have been assessed.      Areas assessed by both nurses:    Head, Face, Ears, Shoulders, Back, Chest, Arms, Elbows, Hands, Sacrum. Buttock, Coccyx, Ischium, Legs. Feet and Heels, and Under Medical Devices         Does the Patient have a Wound? No noted wound(s)       Nilo Prevention initiated by RN: No  Wound Care Orders initiated by RN: No    Pressure Injury (Stage 3,4, Unstageable, DTI, NWPT, and Complex wounds) if present, place Wound referral order by RN under : No    New Ostomies, if present place, Ostomy referral order under : No     Nurse 1 eSignature: Electronically signed by Gabriela Arechiga RN on 2/8/24 at 5:11 PM EST    **SHARE this note so that the co-signing nurse can place an eSignature**    Nurse 2 eSignature: Electronically signed by Alisha Wheeler RN on 2/8/24 at 7:26 PM EST

## 2024-02-09 VITALS
WEIGHT: 225 LBS | DIASTOLIC BLOOD PRESSURE: 82 MMHG | HEIGHT: 67 IN | SYSTOLIC BLOOD PRESSURE: 129 MMHG | OXYGEN SATURATION: 95 % | BODY MASS INDEX: 35.31 KG/M2 | HEART RATE: 102 BPM | RESPIRATION RATE: 16 BRPM | TEMPERATURE: 98.1 F

## 2024-02-09 LAB
ANION GAP SERPL CALCULATED.3IONS-SCNC: 11 MMOL/L (ref 3–16)
BUN SERPL-MCNC: 15 MG/DL (ref 7–20)
CALCIUM SERPL-MCNC: 9.3 MG/DL (ref 8.3–10.6)
CHLORIDE SERPL-SCNC: 102 MMOL/L (ref 99–110)
CO2 SERPL-SCNC: 24 MMOL/L (ref 21–32)
CREAT SERPL-MCNC: <0.5 MG/DL (ref 0.6–1.1)
DEPRECATED RDW RBC AUTO: 13.2 % (ref 12.4–15.4)
GFR SERPLBLD CREATININE-BSD FMLA CKD-EPI: >60 ML/MIN/{1.73_M2}
GLUCOSE SERPL-MCNC: 118 MG/DL (ref 70–99)
HCT VFR BLD AUTO: 38.5 % (ref 36–48)
HGB BLD-MCNC: 13 G/DL (ref 12–16)
MCH RBC QN AUTO: 31.1 PG (ref 26–34)
MCHC RBC AUTO-ENTMCNC: 33.8 G/DL (ref 31–36)
MCV RBC AUTO: 92 FL (ref 80–100)
PLATELET # BLD AUTO: 321 K/UL (ref 135–450)
PMV BLD AUTO: 7.6 FL (ref 5–10.5)
POTASSIUM SERPL-SCNC: 4.2 MMOL/L (ref 3.5–5.1)
POTASSIUM SERPL-SCNC: 4.2 MMOL/L (ref 3.5–5.1)
RBC # BLD AUTO: 4.18 M/UL (ref 4–5.2)
SODIUM SERPL-SCNC: 137 MMOL/L (ref 136–145)
WBC # BLD AUTO: 13.5 K/UL (ref 4–11)

## 2024-02-09 PROCEDURE — 6370000000 HC RX 637 (ALT 250 FOR IP): Performed by: SPECIALIST/TECHNOLOGIST

## 2024-02-09 PROCEDURE — 97110 THERAPEUTIC EXERCISES: CPT

## 2024-02-09 PROCEDURE — APPNB45 APP NON BILLABLE 31-45 MINUTES: Performed by: SPECIALIST/TECHNOLOGIST

## 2024-02-09 PROCEDURE — G0378 HOSPITAL OBSERVATION PER HR: HCPCS

## 2024-02-09 PROCEDURE — 99024 POSTOP FOLLOW-UP VISIT: CPT | Performed by: SPECIALIST/TECHNOLOGIST

## 2024-02-09 PROCEDURE — 80048 BASIC METABOLIC PNL TOTAL CA: CPT

## 2024-02-09 PROCEDURE — 6360000002 HC RX W HCPCS: Performed by: SPECIALIST/TECHNOLOGIST

## 2024-02-09 PROCEDURE — 2580000003 HC RX 258: Performed by: SPECIALIST/TECHNOLOGIST

## 2024-02-09 PROCEDURE — 97530 THERAPEUTIC ACTIVITIES: CPT

## 2024-02-09 PROCEDURE — 97116 GAIT TRAINING THERAPY: CPT

## 2024-02-09 PROCEDURE — 36415 COLL VENOUS BLD VENIPUNCTURE: CPT

## 2024-02-09 PROCEDURE — 96374 THER/PROPH/DIAG INJ IV PUSH: CPT

## 2024-02-09 PROCEDURE — 85027 COMPLETE CBC AUTOMATED: CPT

## 2024-02-09 PROCEDURE — 97165 OT EVAL LOW COMPLEX 30 MIN: CPT

## 2024-02-09 PROCEDURE — 6370000000 HC RX 637 (ALT 250 FOR IP): Performed by: STUDENT IN AN ORGANIZED HEALTH CARE EDUCATION/TRAINING PROGRAM

## 2024-02-09 PROCEDURE — 97535 SELF CARE MNGMENT TRAINING: CPT

## 2024-02-09 RX ORDER — METHOCARBAMOL 500 MG/1
500 TABLET, FILM COATED ORAL 4 TIMES DAILY
Qty: 40 TABLET | Refills: 0 | Status: SHIPPED | OUTPATIENT
Start: 2024-02-09 | End: 2024-02-19

## 2024-02-09 RX ORDER — OXYCODONE HYDROCHLORIDE 5 MG/1
5 TABLET ORAL EVERY 4 HOURS PRN
Status: DISCONTINUED | OUTPATIENT
Start: 2024-02-09 | End: 2024-02-09 | Stop reason: HOSPADM

## 2024-02-09 RX ORDER — POLYETHYLENE GLYCOL 3350 17 G/17G
17 POWDER, FOR SOLUTION ORAL DAILY
Qty: 10 PACKET | Refills: 0 | Status: SHIPPED | OUTPATIENT
Start: 2024-02-10 | End: 2024-02-20

## 2024-02-09 RX ORDER — METHOCARBAMOL 500 MG/1
500 TABLET, FILM COATED ORAL 4 TIMES DAILY
Status: DISCONTINUED | OUTPATIENT
Start: 2024-02-09 | End: 2024-02-09 | Stop reason: HOSPADM

## 2024-02-09 RX ORDER — OXYCODONE HYDROCHLORIDE 5 MG/1
10 TABLET ORAL EVERY 4 HOURS PRN
Status: DISCONTINUED | OUTPATIENT
Start: 2024-02-09 | End: 2024-02-09 | Stop reason: HOSPADM

## 2024-02-09 RX ORDER — HYDROCODONE BITARTRATE AND ACETAMINOPHEN 7.5; 325 MG/1; MG/1
1 TABLET ORAL 4 TIMES DAILY
Status: DISCONTINUED | OUTPATIENT
Start: 2024-02-09 | End: 2024-02-09

## 2024-02-09 RX ADMIN — METHOCARBAMOL 500 MG: 500 TABLET ORAL at 02:05

## 2024-02-09 RX ADMIN — DULOXETINE HYDROCHLORIDE 60 MG: 60 CAPSULE, DELAYED RELEASE ORAL at 08:52

## 2024-02-09 RX ADMIN — HYDROCODONE BITARTRATE AND ACETAMINOPHEN 1 TABLET: 7.5; 325 TABLET ORAL at 13:01

## 2024-02-09 RX ADMIN — HYDROCODONE BITARTRATE AND ACETAMINOPHEN 1 TABLET: 7.5; 325 TABLET ORAL at 08:52

## 2024-02-09 RX ADMIN — OXYCODONE 10 MG: 5 TABLET ORAL at 11:02

## 2024-02-09 RX ADMIN — PREGABALIN 150 MG: 75 CAPSULE ORAL at 08:52

## 2024-02-09 RX ADMIN — MORPHINE SULFATE 4 MG: 4 INJECTION, SOLUTION INTRAMUSCULAR; INTRAVENOUS at 06:51

## 2024-02-09 RX ADMIN — Medication 2000 MG: at 00:51

## 2024-02-09 RX ADMIN — NORTRIPTYLINE HYDROCHLORIDE 25 MG: 25 CAPSULE ORAL at 00:49

## 2024-02-09 RX ADMIN — Medication 10 ML: at 08:54

## 2024-02-09 RX ADMIN — ASPIRIN 81 MG: 81 TABLET, COATED ORAL at 08:53

## 2024-02-09 RX ADMIN — POLYETHYLENE GLYCOL 3350 17 G: 17 POWDER, FOR SOLUTION ORAL at 08:54

## 2024-02-09 RX ADMIN — Medication 10 ML: at 06:52

## 2024-02-09 RX ADMIN — HYDROCODONE BITARTRATE AND ACETAMINOPHEN 1 TABLET: 7.5; 325 TABLET ORAL at 01:47

## 2024-02-09 RX ADMIN — METHOCARBAMOL 500 MG: 500 TABLET ORAL at 12:43

## 2024-02-09 ASSESSMENT — PAIN SCALES - GENERAL
PAINLEVEL_OUTOF10: 7
PAINLEVEL_OUTOF10: 8
PAINLEVEL_OUTOF10: 8
PAINLEVEL_OUTOF10: 7
PAINLEVEL_OUTOF10: 8

## 2024-02-09 ASSESSMENT — PAIN - FUNCTIONAL ASSESSMENT
PAIN_FUNCTIONAL_ASSESSMENT: ACTIVITIES ARE NOT PREVENTED

## 2024-02-09 ASSESSMENT — PAIN DESCRIPTION - LOCATION
LOCATION: LEG
LOCATION: LEG;KNEE;BACK
LOCATION: LEG;KNEE

## 2024-02-09 ASSESSMENT — PAIN DESCRIPTION - ORIENTATION
ORIENTATION: RIGHT

## 2024-02-09 ASSESSMENT — PAIN DESCRIPTION - DESCRIPTORS
DESCRIPTORS: ACHING;THROBBING
DESCRIPTORS: SHARP

## 2024-02-09 ASSESSMENT — PAIN DESCRIPTION - PAIN TYPE
TYPE: SURGICAL PAIN
TYPE: SURGICAL PAIN

## 2024-02-09 NOTE — CARE COORDINATION
CASE MANAGEMENT DISCHARGE SUMMARY      Pt IPTA in [place] home, can sleep on Main Level. Spouse to transport as needed. Works as a .     Discharge to: Home w/spouse. [place] PT/OT/SN.    New Durable Medical Equipment ordered/agency: RW at bedside through [employer]     Transportation:    Family/car:private w/spouse at bedside.    Confirmed discharge plan with:     Patient: yes.     Family:  Spouse, Meet at bedside.     Facility/Agency, name:  [facility] Electronic and confirmed w/Rosie.     RN, name: Meldia     Note: Discharging nurse to complete [facility], reconcile [facility], and place final copy with patient's discharge packet.

## 2024-02-09 NOTE — PLAN OF CARE
Problem: Discharge Planning  Goal: Discharge to home or other facility with appropriate resources  Outcome: Progressing     Problem: Pain  Goal: Verbalizes/displays adequate comfort level or baseline comfort level  2/9/2024 1303 by Melida Lake RN  Outcome: Progressing  Note: Pt scoring pain on 0-10 scale. Pain medications given per MAR. Pt instructed to call out when pain level increasing. Call light within reach.        Problem: Safety - Adult  Goal: Free from fall injury  Outcome: Progressing  Note: Bed in lowest position, wheels locked, 2/4 side rails up, nonskid footwear on. Bed/ chair check alarm in place, call light within reach. Pt instructed to call out when needing assistance. Pt stated understanding.      Problem: ABCDS Injury Assessment  Goal: Absence of physical injury  Outcome: Progressing

## 2024-02-09 NOTE — CONSULTS
V2.0  [facility] [place]      Name:  Dorinda Sánchez /Age/Sex: 1970  (53 y.o. female)   MRN & CSN:  [id number] & [id number] Encounter Date/Time: 2024 1:58 PM EST   Location:   PCP: Funmilayo Ruiz DO     Attending:No att. providers found  Consulting Provider: Tai Talamantes MD PhD      Hospital Day: 2    Assessment and Recommendations   Dorinda Sánchez is a 53 y.o. female with pmh of osteoarthritis and fibromyalgia  who presents with S/P total knee arthroplasty, right    Hospital Problems             Last Modified POA    * (Principal) S/P total knee arthroplasty, right 2024 Yes     Recommendations:     Right knee osteoarthritis: s/p right total knee arthroplasty POD2, hemoglobin stable, pain controlled on roxicodone, PT/OT, orthopedic surgery is primary.  Leukocytosis: reactive secondary to post-op pain/stress, continues to be afebrile, no s/sx of infection, continue to monitor.  Tachycardia: HR in 90s-100s secondary to post-op pain, denies chest pain or shortness of breath, pain control as above, continue to monitor.  Osteoarthritis: back and hip, home meloxicam prn  Fibromyalgia: continue home duloxetine  Peripheral Neuropathy: continue home pregabalin and nortriptyline    Diet ADULT DIET; Regular   DVT Prophylaxis [] Lovenox, []  Heparin, [] SCDs, [] Ambulation,  [] Eliquis, [] Xarelto   Code Status Full Code     Personally reviewed Lab Studies and Imaging     History From:    History obtained from the patient.     History of Present Illness:      Chief Complaint: s/p total knee arthroplasty, right    Dorinda Sánchez is a 53 y.o. female who is seen today by the hospitalist team at the request of Dr. Diop, with a Chief Complaint of post-op right TKA. The patient states that she feels fine this morning except for soreness on right knee. Denies fever, chills, chest pain, shortness of breath, abdominal pain, dysuria.     Review of Systems:      Pertinent positives and negatives      Worried About Running Out of Food in the Last Year: Never true     Ran Out of Food in the Last Year: Never true   Transportation Needs: Patient Declined (2/8/2024)    PRAPARE - Transportation     Lack of Transportation (Medical): Patient declined     Lack of Transportation (Non-Medical): Patient declined   Physical Activity: Unknown (1/3/2024)    Exercise Vital Sign     Days of Exercise per Week: 0 days   Intimate Partner Violence: Not At Risk (5/17/2023)    Humiliation, Afraid, Rape, and Kick questionnaire     Fear of Current or Ex-Partner: No     Emotionally Abused: No     Physically Abused: No     Sexually Abused: No   Housing Stability: Low Risk  (2/8/2024)    Housing Stability Vital Sign     Unable to Pay for Housing in the Last Year: No     Number of Places Lived in the Last Year: 2     Unstable Housing in the Last Year: No     Medications:   Medications:    methocarbamol  500 mg Oral 4x Daily    sodium chloride flush  5-40 mL IntraVENous 2 times per day    aspirin  81 mg Oral BID    polyethylene glycol  17 g Oral Daily    DULoxetine  60 mg Oral Daily    nortriptyline  25 mg Oral Nightly    pregabalin  150 mg Oral BID      Infusions:    sodium chloride Stopped (02/09/24 0700)    lactated ringers IV soln Stopped (02/08/24 1543)     PRN Meds: oxyCODONE, 5 mg, Q4H PRN   Or  oxyCODONE, 10 mg, Q4H PRN  sodium chloride flush, 5-40 mL, PRN  sodium chloride, , PRN  diphenhydrAMINE, 25 mg, Q6H PRN   Or  diphenhydrAMINE, 25 mg, Q6H PRN  ondansetron, 4 mg, Q8H PRN   Or  ondansetron, 4 mg, Q6H PRN      Labs and Imaging   XR KNEE RIGHT (1-2 VIEWS)    Result Date: 2/8/2024  EXAMINATION: 2 XRAY VIEWS OF THE RIGHT KNEE 2/8/2024 10:07 am COMPARISON: 01/03/2024 radiograph HISTORY: ORDERING SYSTEM PROVIDED HISTORY: post op AP and lat TECHNOLOGIST PROVIDED HISTORY: Reason for exam:->post op AP and lat Reason for Exam: post op AP and lat FINDINGS: Recent right total knee arthroplasty.  Orthopedic hardware in good position and

## 2024-02-09 NOTE — CARE COORDINATION
Referral sent to [facility]  Pt/ot  Yoli Phillips notified orders ready to pull from Epic  Office will call patient    Jasmin Luciano RN, BSN [phone]  [facility]   [phone] fax [phone]  [facility] [phone]  [facility] -[phone]

## 2024-02-09 NOTE — DISCHARGE INSTR - COC
Continuity of Care Form    Patient Name: Dorinda Sánchez   :  1970  MRN:  [id number]    Admit date:  2024  Discharge date:  ***    Code Status Order: Full Code   Advance Directives:   Advance Care Flowsheet Documentation       Date/Time Healthcare Directive Type of Healthcare Directive Copy in Chart Healthcare Agent Appointed Healthcare Agent's Name Healthcare Agent's Phone Number    24 0721 No, patient does not have an advance directive for healthcare treatment -- -- -- -- --            Admitting Physician:  Arnulfo Diop MD  PCP: Funmilayo Ruiz DO    Discharging Nurse: ***  Discharging Hospital Unit/Room#: 0530/0530-01  Discharging Unit Phone Number: ***    Emergency Contact:   Extended Emergency Contact Information  Primary Emergency Contact: Meet Sánchez  Address: [address] [place] [address]  Home Phone: [phone]  Mobile Phone: [phone]  Relation: Spouse    Past Surgical History:  Past Surgical History:   Procedure Laterality Date    BACK SURGERY      SPINAL FUSION    COLONOSCOPY      ENDOMETRIAL ABLATION      ESOPHAGOGASTRODUODENOSCOPY      FOOT SURGERY Right     JOINT REPLACEMENT      KNEE ARTHROSCOPY Right 2023    RIGHT KNEE ARTHROSCOPY, MEDIAL MENISCUS DEBRIDEMENT performed by Jose Carlos Wharton MD at [facility] OR    TOTAL KNEE ARTHROPLASTY Right 2024    RIGHT TOTAL KNEE ARTHROPLASTY performed by Arnulfo Diop MD at [facility] OR    URETHRA SURGERY         Immunization History:     There is no immunization history on file for this patient.    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

## 2024-02-09 NOTE — PROGRESS NOTES
Physical Therapy  Facility/Department: [place] [address] - MED SURG/ORTHO  Daily Treatment Note  NAME: Dorinda Sánchez  : 1970  MRN: [id number]    Date of Service: 2024    Discharge Recommendations:  24 hour supervision or assist   PT Equipment Recommendations  Equipment Needed: Yes  Mobility Devices: Walker  Walker: Rolling    Patient Diagnosis(es): The primary encounter diagnosis was S/P total knee arthroplasty, right. Diagnoses of Chronic pain syndrome, Right hand pain, and Trochanteric bursitis of both hips were also pertinent to this visit.    Assessment   Assessment: Pt pleasant and agreeable, she demos mod I/supervision for bed mobility, transfers, ambulation and stairs. Pt is recommended for con't skilled PT  while IP and D/C home with 24 supervision.   Activity Tolerance: Patient tolerated treatment well  Equipment Needed: Yes  Mobility Devices: Walker     Plan    Physical Therapy Plan  General Plan: 2 times a day 7 days a week  Current Treatment Recommendations: Strengthening;ROM;Balance training;Gait training;Functional mobility training;Stair training;Home exercise program;Therapeutic activities;Safety education & training;Transfer training;Patient/Caregiver education & training;Equipment evaluation, education, & procurement;Endurance training;Pain management     Restrictions  Restrictions/Precautions  Restrictions/Precautions: Weight Bearing, Fall Risk  Lower Extremity Weight Bearing Restrictions  Right Lower Extremity Weight Bearing: Weight Bearing As Tolerated     Subjective    Subjective  Subjective: agreeable  Pain: 7/10 R knee pain  Orientation  Overall Orientation Status: Within Normal Limits  Cognition  Overall Cognitive Status: WNL     Objective   Vitals  Pulse: 95  BP: 98/65  MAP (Calculated): 76  SpO2: 94 %  Bed Mobility Training  Bed Mobility Training: Yes  Supine to Sit: Modified independent  Sit to Supine: Modified independent  Balance  Sitting: Intact  Standing: With support  Transfer

## 2024-02-09 NOTE — CONSULTS
[facility] [place] [facility] Consult [place]      Name:  Dorinda Sánchez /Age/Sex: 1970  (53 y.o. female)   MRN & CSN:  [id number] & [id number] Encounter Date/Time: 2024 1:29 PM EST   Location:   PCP: Funmilayo Ruiz DO     Attending:No att. providers found  Consulting Provider: Tai Talamantes MD PhD      Hospital Day: 2    Assessment and Recommendations   Dorinda Sánchez is a 53 y.o. female with pmh of osteoarthritis and fibromyalgia who presents with S/P total knee arthroplasty, right    Hospital Problems             Last Modified POA    * (Principal) S/P total knee arthroplasty, right 2024 Yes       Recommendations:    Right knee osteoarthritis: s/p right total knee arthroplasty POD2, hemoglobin stable, pain controlled on roxicodone, PT/OT, orthopedic surgery is primary.  Leukocytosis: reactive secondary to post-op pain/stress, continues to be afebrile, no s/sx of infection, continue to monitor.  Tachycardia: HR in 90s-100s secondary to post-op pain, denies chest pain or shortness of breath, pain control as above, continue to monitor.  Osteoarthritis: back and hip, home meloxicam prn  Fibromyalgia: continue home duloxetine  Peripheral Neuropathy: continue home pregabalin and nortriptyline      Diet ADULT DIET; Regular   DVT Prophylaxis [] Lovenox, []  Heparin, [] SCDs, [] Ambulation,  [] Eliquis, [] Xarelto [] Coumadin ASA   Hi  History Obtained From:    patient    History of Present Illness:     Chief Complaint: S/P total knee arthroplasty, right    Dorinda Sánchez is a 53 y.o. female who is seen today by the hospitalist team at the request of Dr. Diop, with a Chief Complaint of post-op right TKA. The patient states that she feels fine this morning except for soreness on right knee. Denies fever, chills, chest pain, shortness of breath, abdominal pain, dysuria.      Review of Systems:    Review of Systems    Neg except above     Objective:     Intake/Output Summary (Last 24 hours) at 2024  daily for 60 days.  Patient taking differently: Take 1 capsule by mouth 2 times daily as needed. 1/25/24 3/25/24  Josh Tay MD   fluticasone (FLONASE) 50 MCG/ACT nasal spray 2 sprays by Nasal route daily    Provider, MD Pacheco       Physical Exam: Need 8 Elements   General: NAD  Eyes: EOMI  ENT: neck supple  Cardiovascular: Regular rate.  Respiratory: Clear to auscultation  Gastrointestinal: Soft, non tender  Genitourinary: no suprapubic tenderness  Musculoskeletal: No edema  Right Knee: wound clean and dry without evidence of infection, no erythema, no drainage  Skin: warm, dry  Neuro: Alert.  Psych: Mood appropriate.       Past Medical History:   PMHx   Past Medical History:   Diagnosis Date    Chronic pain     Colitis     DDD (degenerative disc disease), lumbar     Failed back surgical syndrome     Fibromyalgia     Mood disorder (HCC)     Panic attacks     Primary insomnia     Seasonal allergies      PSHX:  has a past surgical history that includes Foot surgery (Right); Urethra surgery; Colonoscopy; back surgery; Esophagogastroduodenoscopy; Endometrial ablation; joint replacement; Knee arthroscopy (Right, 6/13/2023); and Total knee arthroplasty (Right, 2/8/2024).  Allergies: No Known Allergies  Fam HX: family history includes Cancer in her father.  Soc HX:   Social History     Socioeconomic History    Marital status:      Spouse name: None    Number of children: None    Years of education: None    Highest education level: None   Tobacco Use    Smoking status: Never    Smokeless tobacco: Never   Vaping Use    Vaping Use: Never used   Substance and Sexual Activity    Alcohol use: No    Drug use: No     Social Determinants of Health     Food Insecurity: No Food Insecurity (2/8/2024)    Hunger Vital Sign     Worried About Running Out of Food in the Last Year: Never true     Ran Out of Food in the Last Year: Never true   Transportation Needs: Patient Declined (2/8/2024)    PRAPARE - Transportation

## 2024-02-09 NOTE — PROGRESS NOTES
and 24/7 Assist Available   Home environment:    split level home and 7 steps between floors  Steps to enter first floor: N/A  Steps to second floor: Partial flight and hand rail: unilateral  Bathroom: tub/shower unit and standard height commode  [place] [facility] [place]   Prior Level of Function:  Pt. Able to drive: Yes  Pt Fully independent with ADLs: Yes  Pt. Required assistance from family for: Independent PTA  Pt. independent for transfers and gait and walked with No Device  History of falls No     Objective   Pulse: (!) 102  Heart Rate Source: Monitor  BP: 129/82  BP Location: Right upper arm  BP Method: Automatic  Patient Position: Supine  MAP (Calculated): 98  Respirations: 16  SpO2: 95 %  O2 Device: None (Room air)  Temp: 98.1 °F (36.7 °C)         Safety Devices  Type of Devices: Call light within reach;All fall risk precautions in place;Gait belt;Nurse notified;Left in bed (Bed alarm not in place upon entry)    Toilet Transfers  Toilet - Technique: Ambulating (RW)  Equipment Used: Standard toilet  Toilet Transfer: Modified independent  Toilet Transfers Comments: Pt has 3-1 commode at home that she can use over toilet or in shower.  Shower Transfers  Shower Transfers Comments: Pt has shower stall at home and can use 3-1 commode for shower seat.  Recommend assist for shower transfers.  Instructed on safe technique for doing step for shower  AROM: Within functional limits  Strength: Within functional limits  Coordination: Within functional limits  Tone: Normal  Sensation: Intact  ADL  Feeding: Independent  Grooming: Independent  UE Dressing: Independent  LE Dressing: Stand by assistance;Increased time to complete  Toileting: Stand by assistance     Activity Tolerance  Activity Tolerance: Patient tolerated treatment well  Bed mobility  Supine to Sit: Modified independent  Sit to Supine: Modified independent  Transfers  Stand Pivot Transfers: Modified independent (RW)  Sit to stand: Modified independent  Stand to  sit: Modified independent     Cognition  Overall Cognitive Status: WNL  Orientation  Overall Orientation Status: Within Normal Limits        Education Given To: Patient;Family  Education Provided: Role of Therapy;Plan of Care;Fall Prevention Strategies;ADL Adaptive Strategies;Family Education  Education Provided Comments: Disease specific ed:  safe mobility with RW, bathroom transfers, use of DME for ADLs--pt receptive to education  Education Method: Verbal;Demonstration  Barriers to Learning: None  Education Outcome: Verbalized understanding;Demonstrated understanding           AM-PAC - ADL  AM-PAC Daily Activity - Inpatient   How much help is needed for putting on and taking off regular lower body clothing?: A Little  How much help is needed for bathing (which includes washing, rinsing, drying)?: A Little  How much help is needed for toileting (which includes using toilet, bedpan, or urinal)?: A Little  How much help is needed for putting on and taking off regular upper body clothing?: None  How much help is needed for taking care of personal grooming?: None  How much help for eating meals?: None  AM-PAC Inpatient Daily Activity Raw Score: 21  AM-PAC Inpatient ADL T-Scale Score : 44.27  ADL Inpatient CMS 0-100% Score: 32.79  ADL Inpatient CMS G-Code Modifier : CJ    Goals  Short Term Goals  Time Frame for Short Term Goals: 1x  Short Term Goal 1: Perform toilet transfer with Mod I and RW--goal met 2/9  Short Term Goal 2: Demo ability to perform LE ADL tasks with SBA--goal met, pt able to reach to RLE as needed for ADLs 2/9  Patient Goals   Patient goals : Walk with walker at home--Goal met 2/9/24     Therapy Time   Individual Concurrent Group Co-treatment   Time In 0943         Time Out 1006         Minutes 23         Timed Code Treatment Minutes: 13 Minutes (10 min eval)     Zoe Chavez

## 2024-02-09 NOTE — PROGRESS NOTES
Department of Orthopedic Surgery  Physician Assistant   Progress Note    Subjective:       Systemic or Specific Complaints:No Complaints and some pain control, worked well with therapy, was able to do stairs. Tolerating PO, voiding appropriately. Pt's  at bedside    Objective:     Patient Vitals for the past 24 hrs:   BP Temp Temp src Pulse Resp SpO2   02/09/24 0846 129/82 98.1 °F (36.7 °C) -- (!) 102 16 95 %   02/09/24 0759 98/65 -- -- 95 -- 94 %   02/09/24 0608 110/68 98.3 °F (36.8 °C) Oral 94 16 98 %   02/09/24 0147 -- -- -- -- 16 --   02/09/24 0143 118/78 98.7 °F (37.1 °C) Oral 94 16 96 %   02/08/24 2015 (!) 148/99 98 °F (36.7 °C) Oral (!) 110 18 100 %   02/08/24 1730 (!) 150/93 98.4 °F (36.9 °C) Oral (!) 109 18 97 %   02/08/24 1652 -- -- -- -- 16 --   02/08/24 1620 127/70 97.9 °F (36.6 °C) Oral (!) 107 16 95 %   02/08/24 1511 (!) 180/80 98.7 °F (37.1 °C) Oral (!) 101 16 99 %   02/08/24 1430 -- -- -- (!) 107 -- --   02/08/24 1425 (!) 114/93 -- -- (!) 109 -- --   02/08/24 1420 120/77 -- -- (!) 106 -- --   02/08/24 1415 132/86 -- -- (!) 105 -- --   02/08/24 1410 125/88 -- -- (!) 105 -- --   02/08/24 1405 126/77 -- -- (!) 104 -- --   02/08/24 1400 118/81 -- -- (!) 104 -- --   02/08/24 1355 105/68 -- -- (!) 104 -- --   02/08/24 1350 118/79 -- -- (!) 103 -- --   02/08/24 1345 (!) 100/58 -- -- (!) 104 -- --   02/08/24 1340 128/78 -- -- (!) 103 -- --   02/08/24 1335 113/64 -- -- 100 -- --   02/08/24 1330 112/65 -- -- (!) 104 -- --   02/08/24 1325 (!) 111/53 -- -- (!) 105 -- --   02/08/24 1320 128/70 -- -- (!) 104 -- --   02/08/24 1315 119/88 -- -- (!) 103 -- --   02/08/24 1310 (!) 150/67 -- -- (!) 104 -- --   02/08/24 1305 (!) 144/77 -- -- (!) 101 -- --   02/08/24 1300 (!) 123/106 -- -- (!) 102 -- --   02/08/24 1255 (!) 151/104 -- -- 100 -- --       General: alert, appears stated age, cooperative, and no distress   Wound: Wound clean and dry no evidence of infection., No Erythema, No Drainage, and Positive for  Edema   Motion: Painful range of Motion in affected extremity   DVT Exam: No evidence of DVT seen on physical exam.  No cords or calf tenderness.  No significant calf/ankle edema.     Additional exam: Patient seen sitting up in chair at time of interview  Leg lengths equal, rotational alignment neutral  Thigh moderate swelling as expected, compartments compressible  EHL, FHL, gastroc, and anterior tibialis motor intact  Sensation intact to light touch  DP and PT pulses 2+      Data Review  CBC:   Lab Results   Component Value Date/Time    WBC 13.5 02/09/2024 06:31 AM    RBC 4.18 02/09/2024 06:31 AM    HGB 13.0 02/09/2024 06:31 AM    HCT 38.5 02/09/2024 06:31 AM     02/09/2024 06:31 AM       Renal:   Lab Results   Component Value Date/Time     02/09/2024 06:31 AM    K 4.2 02/09/2024 06:31 AM    K 4.2 02/09/2024 06:31 AM     02/09/2024 06:31 AM    CO2 24 02/09/2024 06:31 AM    BUN 15 02/09/2024 06:31 AM    CREATININE <0.5 02/09/2024 06:31 AM    GLUCOSE 118 02/09/2024 06:31 AM    CALCIUM 9.3 02/09/2024 06:31 AM          Assessment:      right total knee arthroplasty, POD 1.  DOS 2/8/24 by Dr Diop       Plan:      1:  WBAT to the RLE with assistive device  2:  Continue Deep venous thrombosis prophylaxis  3:  Continue Pain Control  4:  PT/OT- hhc and rw ordered, Patient requires walker to complete ADLs that could not be done without a crutch or cane.  5:  Two doses IV ancef completed post op   6:  Discharge appropriate. Prescriptions sent to [facility] pharmacy, follow with Dr Diop as scheduled    ALYCIA Espinosa

## 2024-02-09 NOTE — PLAN OF CARE
Problem: Pain  Goal: Verbalizes/displays adequate comfort level or baseline comfort level  2/8/2024 2317 by Cristobal Pool RN  Outcome: Progressing  Note: Resting quietly in bed. No signs of discomfort noted. Call light in reach.

## 2024-02-09 NOTE — DISCHARGE SUMMARY
Department of Orthopedic Surgery  Physician Assistant   Discharge Summary    The Dorinda Sánchez is a 53 y.o. female underwent total knee replacement procedure without complication.  Dorinda Sánchez was admitted to the floor following Her recovery in the PACU.     Discharge Diagnosis  right Knee Arthroplasty    Current Inpatient Medications    Current Facility-Administered Medications: oxyCODONE (ROXICODONE) immediate release tablet 5 mg, 5 mg, Oral, Q4H PRN **OR** oxyCODONE (ROXICODONE) immediate release tablet 10 mg, 10 mg, Oral, Q4H PRN  methocarbamol (ROBAXIN) tablet 500 mg, 500 mg, Oral, 4x Daily  HYDROcodone-acetaminophen (NORCO) 7.5-325 MG per tablet 1 tablet, 1 tablet, Oral, 4x Daily  sodium chloride flush 0.9 % injection 5-40 mL, 5-40 mL, IntraVENous, 2 times per day  sodium chloride flush 0.9 % injection 5-40 mL, 5-40 mL, IntraVENous, PRN  0.9 % sodium chloride infusion, , IntraVENous, PRN  lactated ringers IV soln infusion, , IntraVENous, Continuous  diphenhydrAMINE (BENADRYL) tablet 25 mg, 25 mg, Oral, Q6H PRN **OR** diphenhydrAMINE (BENADRYL) injection 25 mg, 25 mg, IntraVENous, Q6H PRN  ondansetron (ZOFRAN-ODT) disintegrating tablet 4 mg, 4 mg, Oral, Q8H PRN **OR** ondansetron (ZOFRAN) injection 4 mg, 4 mg, IntraVENous, Q6H PRN  aspirin EC tablet 81 mg, 81 mg, Oral, BID  polyethylene glycol (GLYCOLAX) packet 17 g, 17 g, Oral, Daily  DULoxetine (CYMBALTA) extended release capsule 60 mg, 60 mg, Oral, Daily  nortriptyline (PAMELOR) capsule 25 mg, 25 mg, Oral, Nightly  pregabalin (LYRICA) capsule 150 mg, 150 mg, Oral, BID    Post-operatively the patient’s diet was advanced as tolerated and their incision was checked prior to discharge.  Patient was personally seen by myself or a provider team member each day of admission.  The incision is dressing in place, clean, dry, and intact with no signs of infection.  The patient remained neurovascularly intact in the lower extremity and had intact pulses  distally.  Patient’s calf remained soft and showed no evidence of DVT.  The patient was able to move their leg and ankle/foot without any problems post-operatively.  Physical therapy and occupational therapy were consulted and began working with the patient post-operatively.  The patient progressed with PT/OT as would be expected and continued to improve through their stay.  Agree with PT on their recommendations of a assistive device such as a walker or cane at the time of discharge.  The patients pain was initially controlled with IV medications but we were able to transition to oral pain medications soon after arrival to the floor and their pain remained under good control through their hospital stay.  From a medical standpoint the patient remained stable and continued to have the medicine team follow throughout their stay.  The patients dressing was changed/incision was checked on day of d/c.  The patient will be discharged at this time to Home  with their current diet restrictions and will continue to follow the knee precautions outlined to them by us and PT/OT.      Condition on Discharge: Stable    Plan  Return visit in 3 weeks..  Patient was instructed on the use of pain medications, the signs and symptoms of infection, and was given our number to call should they have any questions or concerns following discharge.    For opioid prescriptions given at discharge the following statement is provided for compliance with [employer] rules.  Patient being given increased dosage/quantity of opoid pain medication in excess of [employer] guidelines which noted a 30 MED daily of opioids due to the fact that he/she has undergone major orthopaedic surgery as outlined in rule 4731-11-13.  Dosages and further duration of the pain medication will be re-evaluated at her post op visit in 2 weeks.  Patient was educated on dosing expectations and limits of prescribing as a result of the new [facility] Board rules enacted August 31,

## 2024-02-12 ENCOUNTER — PATIENT MESSAGE (OUTPATIENT)
Dept: ORTHOPEDIC SURGERY | Age: 54
End: 2024-02-12

## 2024-02-12 ENCOUNTER — TELEPHONE (OUTPATIENT)
Dept: ORTHOPEDIC SURGERY | Age: 54
End: 2024-02-12

## 2024-02-12 DIAGNOSIS — Z96.651 S/P TOTAL KNEE ARTHROPLASTY, RIGHT: Primary | ICD-10-CM

## 2024-02-12 DIAGNOSIS — Z96.651 S/P TOTAL KNEE ARTHROPLASTY, RIGHT: ICD-10-CM

## 2024-02-12 RX ORDER — OXYCODONE HYDROCHLORIDE 5 MG/1
5 TABLET ORAL EVERY 6 HOURS PRN
Qty: 28 TABLET | Refills: 0 | Status: SHIPPED | OUTPATIENT
Start: 2024-02-12 | End: 2024-02-19

## 2024-02-12 RX ORDER — METHYLPREDNISOLONE 4 MG/1
TABLET ORAL
Qty: 1 KIT | Refills: 0 | Status: SHIPPED | OUTPATIENT
Start: 2024-02-12

## 2024-02-12 NOTE — TELEPHONE ENCOUNTER
Patient was given oxycodone #28 1 po q 6h prn pain.   On surgery date Thursday.   Wants to know if she can have a refill.   Pharmacy [facility]

## 2024-02-13 NOTE — TELEPHONE ENCOUNTER
Attempted to contact pt, left voicemail with purpose and call back number.    Janeth Darek  Orthopedic Nurse Navigator  Phone number: [phone]    Follow up appointments:    Future Appointments   Date Time Provider Department Center   2/26/2024 11:40 AM Arnulfo Diop MD [facility]   2/29/2024  9:30 AM Josh Tay MD  [facility]

## 2024-02-16 ENCOUNTER — TELEPHONE (OUTPATIENT)
Dept: ORTHOPEDIC SURGERY | Age: 54
End: 2024-02-16

## 2024-02-16 NOTE — TELEPHONE ENCOUNTER
Prescription Refill     Medication Name:  PAIN MED   Pharmacy: [facility] [id number] - [address] JEN Villegas CHANDLER [phone] - F [phone]  Patient Contact Number:  [phone]    Pt ASKING FOR REFILL ON MED. SHE ALSO SENT [employer] MESSAGE EARLIER, TODAY.     PLEASE SEND ASAP. SHE WILL BE OUT OVER THE WEEKEND.

## 2024-02-19 ENCOUNTER — TELEPHONE (OUTPATIENT)
Dept: ORTHOPEDIC SURGERY | Age: 54
End: 2024-02-19

## 2024-02-19 NOTE — TELEPHONE ENCOUNTER
Prescription Refill     Medication Name: OXYCODONE & MUSCLE RELAXER  Pharmacy: [facility]   Patient Contact Number: [phone]    PATIENT CALLED TO SPEAK WITH CLINICAL TO REQUEST RX. SHE STATED THAT SHE IS CURRENTLY OUT OF RX.     [facility] [place] [address]    SHE STATED THAT SHE HAS ALSO COMMUNICATED THROUGH [facility] REGARDING QUESTIONS SHE HAS SPECIFICALLY FOR THE TEAM.      STORE [phone]    PLEASE ADVISE

## 2024-02-21 ENCOUNTER — TELEPHONE (OUTPATIENT)
Dept: ORTHOPEDIC SURGERY | Age: 54
End: 2024-02-21

## 2024-02-21 NOTE — TELEPHONE ENCOUNTER
Attempted to contact pt, left voicemail with purpose and call back number.    Janeth Darek  Orthopedic Nurse Navigator  Phone number: [phone]    Follow up appointments:    Future Appointments   Date Time Provider Department Center   2/26/2024 11:40 AM Arnulfo Diop MD [facility]   2/29/2024  9:30 AM Josh Tay MD [facility]     Signed off from pt.  Instructed pt to call RN or Surgeon's office with any issues or concerns.

## 2024-02-22 DIAGNOSIS — Z96.651 S/P TOTAL KNEE ARTHROPLASTY, RIGHT: ICD-10-CM

## 2024-02-22 RX ORDER — OXYCODONE HYDROCHLORIDE 5 MG/1
5 TABLET ORAL EVERY 4 HOURS PRN
Qty: 42 TABLET | Refills: 0 | Status: SHIPPED | OUTPATIENT
Start: 2024-02-22 | End: 2024-02-29

## 2024-02-26 ENCOUNTER — OFFICE VISIT (OUTPATIENT)
Dept: ORTHOPEDIC SURGERY | Age: 54
End: 2024-02-26

## 2024-02-26 DIAGNOSIS — Z96.651 S/P TOTAL KNEE ARTHROPLASTY, RIGHT: Primary | ICD-10-CM

## 2024-02-26 PROCEDURE — 99024 POSTOP FOLLOW-UP VISIT: CPT | Performed by: STUDENT IN AN ORGANIZED HEALTH CARE EDUCATION/TRAINING PROGRAM

## 2024-02-26 NOTE — PROGRESS NOTES
History: 53-year-old female presents for follow-up after right total knee arthroplasty.  Previous opioid narcotic use.  Difficulties with postoperative pain control.  She has been enrolled in home physical therapy and scheduled as an outpatient therapy.  She is progressing well with this.  She is recently transition from a walker to a cane.  She continues on her baseline opioid pain prescription as well as 5 to 10 mg of oxycodone every 4-6 hours in addition to Tylenol, meloxicam and Robaxin.  No new specific issues and feels like overall moving in the right direction.    Exam: Right knee exam shows Incision healing well without erythema or drainage. ROM 0-120. Well controlled effusion and soft tissue swelling. No ligamentous instability and No neurovascular compromise distally.     Assessment: 52 y/o F progressing well s/p R TKA. Pre op opioid tolerance     Plan: Overall progressing well.  Delayed pain relief due to opioid tolerance preoperatively.  Very happy with her current range of motion and strength and she will transition to outpatient therapy with [facility].  Follow-up with me in about a month to assess further progress.    Arnulfo Diop MD

## 2024-02-29 ENCOUNTER — OFFICE VISIT (OUTPATIENT)
Dept: PAIN MANAGEMENT | Age: 54
End: 2024-02-29
Payer: COMMERCIAL

## 2024-02-29 VITALS
BODY MASS INDEX: 36.34 KG/M2 | SYSTOLIC BLOOD PRESSURE: 116 MMHG | OXYGEN SATURATION: 96 % | HEART RATE: 99 BPM | DIASTOLIC BLOOD PRESSURE: 73 MMHG | WEIGHT: 232 LBS

## 2024-02-29 DIAGNOSIS — M79.7 FIBROMYALGIA: ICD-10-CM

## 2024-02-29 DIAGNOSIS — M70.62 TROCHANTERIC BURSITIS OF BOTH HIPS: ICD-10-CM

## 2024-02-29 DIAGNOSIS — M47.816 FACET SYNDROME, LUMBAR: ICD-10-CM

## 2024-02-29 DIAGNOSIS — M70.61 TROCHANTERIC BURSITIS OF BOTH HIPS: ICD-10-CM

## 2024-02-29 DIAGNOSIS — M96.1 FAILED BACK SURGICAL SYNDROME: ICD-10-CM

## 2024-02-29 DIAGNOSIS — M79.18 MYOFASCIAL PAIN: ICD-10-CM

## 2024-02-29 DIAGNOSIS — M51.36 DDD (DEGENERATIVE DISC DISEASE), LUMBAR: ICD-10-CM

## 2024-02-29 DIAGNOSIS — G89.4 CHRONIC PAIN SYNDROME: ICD-10-CM

## 2024-02-29 DIAGNOSIS — R10.84 GENERALIZED ABDOMINAL PAIN: ICD-10-CM

## 2024-02-29 DIAGNOSIS — M54.16 LUMBAR RADICULOPATHY: ICD-10-CM

## 2024-02-29 DIAGNOSIS — M17.11 PRIMARY OSTEOARTHRITIS OF RIGHT KNEE: ICD-10-CM

## 2024-02-29 PROCEDURE — 99213 OFFICE O/P EST LOW 20 MIN: CPT | Performed by: INTERNAL MEDICINE

## 2024-02-29 RX ORDER — HYDROCODONE BITARTRATE AND ACETAMINOPHEN 7.5; 325 MG/1; MG/1
1 TABLET ORAL 3 TIMES DAILY PRN
Qty: 105 TABLET | Refills: 0 | Status: SHIPPED | OUTPATIENT
Start: 2024-02-29 | End: 2024-04-04

## 2024-02-29 RX ORDER — NORTRIPTYLINE HYDROCHLORIDE 25 MG/1
25-50 CAPSULE ORAL NIGHTLY
Qty: 60 CAPSULE | Refills: 1 | Status: SHIPPED | OUTPATIENT
Start: 2024-02-29

## 2024-02-29 RX ORDER — PREGABALIN 150 MG/1
150 CAPSULE ORAL 2 TIMES DAILY
Qty: 60 CAPSULE | Refills: 1 | Status: SHIPPED | OUTPATIENT
Start: 2024-02-29 | End: 2024-04-29

## 2024-02-29 RX ORDER — DULOXETIN HYDROCHLORIDE 60 MG/1
60 CAPSULE, DELAYED RELEASE ORAL DAILY
Qty: 30 CAPSULE | Refills: 1 | Status: SHIPPED | OUTPATIENT
Start: 2024-02-29

## 2024-02-29 NOTE — PROGRESS NOTES
alcohol with the narcotic pain medicines, advised against driving or handling machinery while adjusting the dose of medicines or if having cognitive  issues related to the current medications.Risk of overdose and death, if medicines not taken as prescribed, were also discussed. If the patient develops new symptoms or if the symptoms worsen, the patient should call the office.    While transcribing every attempt was made to maintain the accuracy of the note in terms of it's contents,there may have been some errors made inadvertently.  Thank you for allowing me to participate in the care of this patient.    Josh Tay MD.    Cc: Funmilayo Lake DO

## 2024-03-08 ENCOUNTER — PATIENT MESSAGE (OUTPATIENT)
Dept: ORTHOPEDIC SURGERY | Age: 54
End: 2024-03-08

## 2024-03-08 DIAGNOSIS — Z96.651 S/P TOTAL KNEE ARTHROPLASTY, RIGHT: Primary | ICD-10-CM

## 2024-03-08 RX ORDER — OXYCODONE HYDROCHLORIDE 5 MG/1
5 TABLET ORAL EVERY 6 HOURS PRN
Qty: 28 TABLET | Refills: 0 | Status: SHIPPED | OUTPATIENT
Start: 2024-03-08 | End: 2024-03-15

## 2024-03-08 NOTE — TELEPHONE ENCOUNTER
From: Dorinda Sánchez  To: Dr. Arnulfo Diop  Sent: 3/8/2024 11:00 AM EST  Subject: 4 weeks post op    Hello, my physical therapist wanted me to let you know that I severely pulled my thigh muscle right above my new knee. I can barely use it but I am going to PT today to see if they can help me. Let me know if I need to do anything more. I am also out of my pain medication if I could get another refill. This is in addition to the every 8 hour hydrocodone I am currently taking.   Thank you, Dorinda Sánchez

## 2024-03-14 DIAGNOSIS — Z96.651 S/P TOTAL KNEE ARTHROPLASTY, RIGHT: ICD-10-CM

## 2024-03-14 RX ORDER — OXYCODONE HYDROCHLORIDE 5 MG/1
5 TABLET ORAL EVERY 6 HOURS PRN
Qty: 28 TABLET | Refills: 0 | Status: SHIPPED | OUTPATIENT
Start: 2024-03-14 | End: 2024-03-21

## 2024-03-25 ENCOUNTER — OFFICE VISIT (OUTPATIENT)
Dept: ORTHOPEDIC SURGERY | Age: 54
End: 2024-03-25

## 2024-03-25 VITALS — HEIGHT: 67 IN | WEIGHT: 232 LBS | BODY MASS INDEX: 36.41 KG/M2

## 2024-03-25 DIAGNOSIS — Z96.651 S/P TOTAL KNEE ARTHROPLASTY, RIGHT: Primary | ICD-10-CM

## 2024-03-25 PROCEDURE — 99024 POSTOP FOLLOW-UP VISIT: CPT | Performed by: STUDENT IN AN ORGANIZED HEALTH CARE EDUCATION/TRAINING PROGRAM

## 2024-03-25 RX ORDER — MELATONIN
1000 DAILY
Qty: 30 TABLET | Refills: 0 | Status: SHIPPED | OUTPATIENT
Start: 2024-03-25

## 2024-03-25 RX ORDER — OXYCODONE HYDROCHLORIDE 5 MG/1
5 TABLET ORAL EVERY 6 HOURS PRN
Qty: 28 TABLET | Refills: 0 | Status: SHIPPED | OUTPATIENT
Start: 2024-03-25 | End: 2024-04-01

## 2024-04-01 NOTE — PROGRESS NOTES
History: 53-year-old female presents for follow-up after right total knee arthroplasty.  Difficulties with postoperative pain control d/t pre op opioids, improved. Progressing with out patient PT. Off assistive devices. Still endorsing anterior knee pain and stiffness.    Exam: Right knee exam shows Incision healing well without erythema or drainage. ROM 0-120. Well controlled effusion and soft tissue swelling. No ligamentous instability and No neurovascular compromise distally.     Assessment: 54 y/o F progressing well s/p R TKA. Pre op opioid tolerance     Plan: Overall progressing well. Continue PT. F/U in a month.     Arnulfo Diop MD

## 2024-04-04 ENCOUNTER — OFFICE VISIT (OUTPATIENT)
Dept: PAIN MANAGEMENT | Age: 54
End: 2024-04-04

## 2024-04-04 VITALS
DIASTOLIC BLOOD PRESSURE: 95 MMHG | OXYGEN SATURATION: 96 % | HEART RATE: 90 BPM | BODY MASS INDEX: 36.65 KG/M2 | SYSTOLIC BLOOD PRESSURE: 136 MMHG | WEIGHT: 234 LBS

## 2024-04-04 DIAGNOSIS — M79.7 FIBROMYALGIA: ICD-10-CM

## 2024-04-04 DIAGNOSIS — M54.16 LUMBAR RADICULOPATHY: ICD-10-CM

## 2024-04-04 DIAGNOSIS — M17.11 PRIMARY OSTEOARTHRITIS OF RIGHT KNEE: ICD-10-CM

## 2024-04-04 DIAGNOSIS — M79.641 RIGHT HAND PAIN: ICD-10-CM

## 2024-04-04 DIAGNOSIS — M70.62 TROCHANTERIC BURSITIS OF BOTH HIPS: ICD-10-CM

## 2024-04-04 DIAGNOSIS — M51.36 DDD (DEGENERATIVE DISC DISEASE), LUMBAR: ICD-10-CM

## 2024-04-04 DIAGNOSIS — M70.61 TROCHANTERIC BURSITIS OF BOTH HIPS: ICD-10-CM

## 2024-04-04 DIAGNOSIS — M79.18 MYOFASCIAL PAIN: ICD-10-CM

## 2024-04-04 DIAGNOSIS — R10.84 GENERALIZED ABDOMINAL PAIN: ICD-10-CM

## 2024-04-04 DIAGNOSIS — M96.1 FAILED BACK SURGICAL SYNDROME: ICD-10-CM

## 2024-04-04 DIAGNOSIS — M47.816 FACET SYNDROME, LUMBAR: ICD-10-CM

## 2024-04-04 DIAGNOSIS — G89.4 CHRONIC PAIN SYNDROME: ICD-10-CM

## 2024-04-04 RX ORDER — DULOXETIN HYDROCHLORIDE 60 MG/1
60 CAPSULE, DELAYED RELEASE ORAL DAILY
Qty: 30 CAPSULE | Refills: 1 | Status: SHIPPED | OUTPATIENT
Start: 2024-04-04

## 2024-04-04 RX ORDER — MELOXICAM 15 MG/1
15 TABLET ORAL DAILY
Qty: 30 TABLET | Refills: 1 | Status: SHIPPED | OUTPATIENT
Start: 2024-04-04

## 2024-04-04 RX ORDER — NORTRIPTYLINE HYDROCHLORIDE 25 MG/1
25-50 CAPSULE ORAL NIGHTLY
Qty: 60 CAPSULE | Refills: 1 | Status: SHIPPED | OUTPATIENT
Start: 2024-04-04

## 2024-04-04 RX ORDER — HYDROCODONE BITARTRATE AND ACETAMINOPHEN 7.5; 325 MG/1; MG/1
1 TABLET ORAL 3 TIMES DAILY PRN
Qty: 105 TABLET | Refills: 0 | Status: SHIPPED | OUTPATIENT
Start: 2024-04-04 | End: 2024-05-09

## 2024-04-04 RX ORDER — PREGABALIN 150 MG/1
150 CAPSULE ORAL 2 TIMES DAILY
Qty: 60 CAPSULE | Refills: 1 | Status: SHIPPED | OUTPATIENT
Start: 2024-04-04 | End: 2024-06-03

## 2024-04-04 NOTE — PROGRESS NOTES
Dorinda Sánchez  1970  [id number]    HISTORY OF PRESENT ILLNESS:  Ms. Sánchez is a 53 y.o. female returns for a follow up visit for multiple medical problems.  Her  presenting problems are   1. Chronic pain syndrome    2. Fibromyalgia    3. Trochanteric bursitis of both hips    4. Myofascial pain    5. DDD (degenerative disc disease), lumbar    6. Failed back surgical syndrome    7. Lumbar radiculopathy    8. Right hand pain    9. Primary osteoarthritis of right knee    10. Facet syndrome, lumbar    11. Generalized abdominal pain    .    As per information/history obtained from the PADT(patient assessment and documentation tool) -  She complains of pain in the lower back and knees Right She rates the pain 4/10 and describes it as aching, burning.  Pain is made worse by: movement, walking, lifting.  Current treatment regimen has helped relieve about 60% of the pain.  She denies side effects from the current pain regimen.   Patient reports that since last follow up visit the physical functioning is unchanged, family/social relationships are unchanged, mood is unchanged sleep patterns are unchanged.  Ms. Sánchez states that since starting the treatment with the current regimen the  overall functioning  in the above aspects is  better,Patient denies neurological bowel or bladder. Patient denies misusing/abusing her narcotic pain medications or using any illegal drugs.  There are No indicators for possible drug abuse, addiction or diversion problems, Upon obtaining the medical history from Ms. Sánchez regarding today's office visit for her presenting problems, patient states she is recovering  from knee surgery, she states she is doing okay. Ms. Sánchez states she has been exercising some. She states she has good ROM. Patient states she was given Oxycodone post surgery, she states she is almost finished with them. Patient reports her weight has been stable. She states she will return back to work in the next few weeks.

## 2024-04-22 ENCOUNTER — OFFICE VISIT (OUTPATIENT)
Dept: ORTHOPEDIC SURGERY | Age: 54
End: 2024-04-22

## 2024-04-22 VITALS — HEIGHT: 67 IN | WEIGHT: 234 LBS | BODY MASS INDEX: 36.73 KG/M2

## 2024-04-22 DIAGNOSIS — Z96.651 S/P TOTAL KNEE ARTHROPLASTY, RIGHT: Primary | ICD-10-CM

## 2024-04-22 PROCEDURE — 99024 POSTOP FOLLOW-UP VISIT: CPT | Performed by: STUDENT IN AN ORGANIZED HEALTH CARE EDUCATION/TRAINING PROGRAM

## 2024-05-09 ENCOUNTER — OFFICE VISIT (OUTPATIENT)
Dept: PAIN MANAGEMENT | Age: 54
End: 2024-05-09

## 2024-05-09 VITALS
BODY MASS INDEX: 36.02 KG/M2 | HEART RATE: 99 BPM | OXYGEN SATURATION: 100 % | DIASTOLIC BLOOD PRESSURE: 81 MMHG | WEIGHT: 230 LBS | SYSTOLIC BLOOD PRESSURE: 127 MMHG

## 2024-05-09 DIAGNOSIS — M79.7 FIBROMYALGIA: ICD-10-CM

## 2024-05-09 DIAGNOSIS — G89.4 CHRONIC PAIN SYNDROME: ICD-10-CM

## 2024-05-09 DIAGNOSIS — M70.62 TROCHANTERIC BURSITIS OF BOTH HIPS: ICD-10-CM

## 2024-05-09 DIAGNOSIS — Z51.81 ENCOUNTER FOR THERAPEUTIC DRUG MONITORING: ICD-10-CM

## 2024-05-09 DIAGNOSIS — M17.11 PRIMARY OSTEOARTHRITIS OF RIGHT KNEE: ICD-10-CM

## 2024-05-09 DIAGNOSIS — F39 MOOD DISORDER (HCC): ICD-10-CM

## 2024-05-09 DIAGNOSIS — M51.36 DDD (DEGENERATIVE DISC DISEASE), LUMBAR: ICD-10-CM

## 2024-05-09 DIAGNOSIS — M47.816 FACET SYNDROME, LUMBAR: ICD-10-CM

## 2024-05-09 DIAGNOSIS — M70.61 TROCHANTERIC BURSITIS OF BOTH HIPS: ICD-10-CM

## 2024-05-09 DIAGNOSIS — F51.01 PRIMARY INSOMNIA: ICD-10-CM

## 2024-05-09 DIAGNOSIS — M79.18 MYOFASCIAL PAIN: ICD-10-CM

## 2024-05-09 DIAGNOSIS — M54.16 LUMBAR RADICULOPATHY: ICD-10-CM

## 2024-05-09 DIAGNOSIS — M96.1 FAILED BACK SURGICAL SYNDROME: ICD-10-CM

## 2024-05-09 DIAGNOSIS — R10.84 GENERALIZED ABDOMINAL PAIN: ICD-10-CM

## 2024-05-09 RX ORDER — HYDROCODONE BITARTRATE AND ACETAMINOPHEN 7.5; 325 MG/1; MG/1
1 TABLET ORAL 3 TIMES DAILY PRN
Qty: 84 TABLET | Refills: 0 | Status: SHIPPED | OUTPATIENT
Start: 2024-05-09 | End: 2024-06-06

## 2024-05-09 NOTE — PROGRESS NOTES
Dorinda Sánchez  1970  [id number]    HISTORY OF PRESENT ILLNESS:  Ms. Sánchez is a 53 y.o. female returns for a follow up visit for multiple medical problems.  Her  presenting problems are   1. Chronic pain syndrome    2. Fibromyalgia    3. Trochanteric bursitis of both hips    4. Myofascial pain    5. DDD (degenerative disc disease), lumbar    6. Failed back surgical syndrome    7. Lumbar radiculopathy    8. Primary osteoarthritis of right knee    9. Facet syndrome, lumbar    10. Generalized abdominal pain    11. Encounter for therapeutic drug monitoring    12. Mood disorder (HCC)    13. Primary insomnia    .    As per information/history obtained from the PADT(patient assessment and documentation tool) -  She complains of pain in the lower back with radiation to the knees Right She rates the pain 3/10 and describes it as aching.  Pain is made worse by: movement, walking, standing, sitting, bending, lifting.  Current treatment regimen has helped relieve about 50% of the pain.  She denies side effects from the current pain regimen.   Patient reports that since last follow up visit the physical functioning is unchanged, family/social relationships are unchanged, mood is unchanged sleep patterns are unchanged.  Ms. Sánchez states that since starting the treatment with the current regimen the  overall functioning  in the above aspects is  better,Patient denies neurological bowel or bladder. Patient denies misusing/abusing her narcotic pain medications or using any illegal drugs.  There are No indicators for possible drug abuse, addiction or diversion problems.     Upon obtaining the medical history from Ms. Sánchez regarding today's office visit for her presenting problems, Patient states she has been doing fair, managing with the medications. She says she's using Norco 3 per day along with Mobic. She complains her knee and back hurt the most. She denies any constipation symptoms. She complains the trigger finger

## 2024-05-14 NOTE — PROGRESS NOTES
History: 53-year-old female presents for follow-up after right total knee arthroplasty.  Difficulties with postoperative pain control d/t pre op opioids, improved. Off assistive devices. Still endorsing anterior knee pain and stiffness.    Exam: Right knee exam shows Incision healing well without erythema or drainage. ROM 0-120. Well controlled effusion and soft tissue swelling. No ligamentous instability and No neurovascular compromise distally.     Images - 3V R knee show stable position of components     Assessment: 54 y/o F progressing well s/p R TKA.     Plan: Advised return to PT to work on the stiffness and quad strengthening, will improve with time. OK to f/u PRN.     Arnulfo Diop MD

## 2024-06-06 ENCOUNTER — OFFICE VISIT (OUTPATIENT)
Dept: PAIN MANAGEMENT | Age: 54
End: 2024-06-06

## 2024-06-06 VITALS
OXYGEN SATURATION: 97 % | DIASTOLIC BLOOD PRESSURE: 94 MMHG | WEIGHT: 228.4 LBS | HEART RATE: 98 BPM | SYSTOLIC BLOOD PRESSURE: 142 MMHG | BODY MASS INDEX: 35.77 KG/M2

## 2024-06-06 DIAGNOSIS — M79.7 FIBROMYALGIA: ICD-10-CM

## 2024-06-06 DIAGNOSIS — M96.1 FAILED BACK SURGICAL SYNDROME: ICD-10-CM

## 2024-06-06 DIAGNOSIS — M65.331 TRIGGER MIDDLE FINGER OF RIGHT HAND: ICD-10-CM

## 2024-06-06 DIAGNOSIS — M70.62 TROCHANTERIC BURSITIS OF BOTH HIPS: ICD-10-CM

## 2024-06-06 DIAGNOSIS — G89.4 CHRONIC PAIN SYNDROME: ICD-10-CM

## 2024-06-06 DIAGNOSIS — M51.36 DDD (DEGENERATIVE DISC DISEASE), LUMBAR: ICD-10-CM

## 2024-06-06 DIAGNOSIS — M65.341 TRIGGER RING FINGER OF RIGHT HAND: ICD-10-CM

## 2024-06-06 DIAGNOSIS — R10.84 GENERALIZED ABDOMINAL PAIN: ICD-10-CM

## 2024-06-06 DIAGNOSIS — M70.61 TROCHANTERIC BURSITIS OF BOTH HIPS: ICD-10-CM

## 2024-06-06 DIAGNOSIS — M47.816 FACET SYNDROME, LUMBAR: ICD-10-CM

## 2024-06-06 DIAGNOSIS — M54.16 LUMBAR RADICULOPATHY: ICD-10-CM

## 2024-06-06 DIAGNOSIS — M16.11 PRIMARY OSTEOARTHRITIS OF RIGHT HIP: ICD-10-CM

## 2024-06-06 DIAGNOSIS — M17.11 PRIMARY OSTEOARTHRITIS OF RIGHT KNEE: ICD-10-CM

## 2024-06-06 DIAGNOSIS — M79.18 MYOFASCIAL PAIN: ICD-10-CM

## 2024-06-06 RX ORDER — HYDROCODONE BITARTRATE AND ACETAMINOPHEN 7.5; 325 MG/1; MG/1
1 TABLET ORAL 3 TIMES DAILY PRN
Qty: 84 TABLET | Refills: 0 | Status: SHIPPED | OUTPATIENT
Start: 2024-06-06 | End: 2024-07-04

## 2024-06-06 RX ORDER — TRIAMCINOLONE ACETONIDE 40 MG/ML
40 INJECTION, SUSPENSION INTRA-ARTICULAR; INTRAMUSCULAR ONCE
Status: COMPLETED | OUTPATIENT
Start: 2024-06-06 | End: 2024-06-06

## 2024-06-06 RX ORDER — MELOXICAM 15 MG/1
15 TABLET ORAL DAILY
Qty: 30 TABLET | Refills: 1 | Status: SHIPPED | OUTPATIENT
Start: 2024-06-06

## 2024-06-06 RX ADMIN — TRIAMCINOLONE ACETONIDE 40 MG: 40 INJECTION, SUSPENSION INTRA-ARTICULAR; INTRAMUSCULAR at 15:35

## 2024-06-06 RX ADMIN — TRIAMCINOLONE ACETONIDE 40 MG: 40 INJECTION, SUSPENSION INTRA-ARTICULAR; INTRAMUSCULAR at 15:36

## 2024-06-06 NOTE — PROGRESS NOTES
Dorinda Sánchez  1970  [id number]      HISTORY OF PRESENT ILLNESS:  Ms. Sánchez is a 53 y.o. female returns for a follow up visit for pain management  She has a diagnosis of   1. Chronic pain syndrome    2. Fibromyalgia    3. Myofascial pain    4. DDD (degenerative disc disease), lumbar    5. Failed back surgical syndrome    6. Lumbar radiculopathy    7. Primary osteoarthritis of right knee    8. Facet syndrome, lumbar    9. Generalized abdominal pain    10. Mood disorder (HCC)    11. Primary insomnia    12. Primary osteoarthritis of right hip    13. Trochanteric bursitis of both hips    .      As per information/history obtained from the PADT(patient assessment and documentation tool) -  She complains of pain in the hands Right, lower back, and knees Right with radiation to the buttocks She rates the pain 4/10 and describes it as aching.  Pain is made worse by: movement, bending. She denies any side effects from the current pain regimen. Patient reports that since last follow up visit the physical functioning is unchanged, family/social relationships are unchanged, mood is unchanged sleep patterns are unchanged. Ms. Sánchez states that since starting the treatment with the current regimen the  overall functioning  in the above aspects is  better, Patient denies misusing/abusing her narcotic pain medications or using any illegal drugs.  There are No indicators for possible drug abuse, addiction or diversion problems.   Upon obtaining the medical history from Ms. Sánchez regarding today's office visit for her presenting problems,Patient reports she has been doing fair, managing with the medications. She states she's using Norco along with other adjuvants. She denies any constipation symptoms. She mentions she's managing okay with the medications. She complains right hand locking up, middle and ring finger.       ALLERGIES: Patients list of allergies were reviewed     MEDICATIONS: Ms. Sánchez list of medications

## 2024-07-03 ENCOUNTER — TELEPHONE (OUTPATIENT)
Dept: PAIN MANAGEMENT | Age: 54
End: 2024-07-03

## 2024-07-03 DIAGNOSIS — M79.7 FIBROMYALGIA: ICD-10-CM

## 2024-07-03 DIAGNOSIS — M47.816 FACET SYNDROME, LUMBAR: ICD-10-CM

## 2024-07-03 DIAGNOSIS — M51.36 DDD (DEGENERATIVE DISC DISEASE), LUMBAR: ICD-10-CM

## 2024-07-03 DIAGNOSIS — M79.18 MYOFASCIAL PAIN: ICD-10-CM

## 2024-07-03 DIAGNOSIS — M70.62 TROCHANTERIC BURSITIS OF BOTH HIPS: ICD-10-CM

## 2024-07-03 DIAGNOSIS — G89.4 CHRONIC PAIN SYNDROME: ICD-10-CM

## 2024-07-03 DIAGNOSIS — M17.11 PRIMARY OSTEOARTHRITIS OF RIGHT KNEE: ICD-10-CM

## 2024-07-03 DIAGNOSIS — M54.16 LUMBAR RADICULOPATHY: ICD-10-CM

## 2024-07-03 DIAGNOSIS — R10.84 GENERALIZED ABDOMINAL PAIN: ICD-10-CM

## 2024-07-03 DIAGNOSIS — M70.61 TROCHANTERIC BURSITIS OF BOTH HIPS: ICD-10-CM

## 2024-07-03 DIAGNOSIS — M96.1 FAILED BACK SURGICAL SYNDROME: ICD-10-CM

## 2024-07-03 RX ORDER — HYDROCODONE BITARTRATE AND ACETAMINOPHEN 7.5; 325 MG/1; MG/1
1 TABLET ORAL 3 TIMES DAILY PRN
Qty: 21 TABLET | Refills: 0 | Status: SHIPPED | OUTPATIENT
Start: 2024-07-03 | End: 2024-07-10

## 2024-07-03 NOTE — TELEPHONE ENCOUNTER
Called patient to advise her that her medication was sent to the pharmacy. Patient did not answer, LVM

## 2024-07-03 NOTE — TELEPHONE ENCOUNTER
Pt is requesting an extension on her hydrocodone. She was told to schedule 5 weeks out and was only prescribed 4 weeks worth of medication. Her next appointment is 7/11 and she runs out of medication on 7/4. She would like the script sent to [facility] [id number] - [facility] [address] [facility] [phone]

## 2024-07-11 ENCOUNTER — OFFICE VISIT (OUTPATIENT)
Dept: PAIN MANAGEMENT | Age: 54
End: 2024-07-11
Payer: COMMERCIAL

## 2024-07-11 VITALS
OXYGEN SATURATION: 98 % | DIASTOLIC BLOOD PRESSURE: 85 MMHG | WEIGHT: 226 LBS | HEART RATE: 88 BPM | SYSTOLIC BLOOD PRESSURE: 124 MMHG | BODY MASS INDEX: 35.4 KG/M2

## 2024-07-11 DIAGNOSIS — R10.84 GENERALIZED ABDOMINAL PAIN: ICD-10-CM

## 2024-07-11 DIAGNOSIS — M16.11 PRIMARY OSTEOARTHRITIS OF RIGHT HIP: ICD-10-CM

## 2024-07-11 DIAGNOSIS — F51.01 PRIMARY INSOMNIA: ICD-10-CM

## 2024-07-11 DIAGNOSIS — F39 MOOD DISORDER (HCC): ICD-10-CM

## 2024-07-11 DIAGNOSIS — M70.62 TROCHANTERIC BURSITIS OF BOTH HIPS: ICD-10-CM

## 2024-07-11 DIAGNOSIS — M47.816 FACET SYNDROME, LUMBAR: ICD-10-CM

## 2024-07-11 DIAGNOSIS — M79.7 FIBROMYALGIA: ICD-10-CM

## 2024-07-11 DIAGNOSIS — M17.11 PRIMARY OSTEOARTHRITIS OF RIGHT KNEE: ICD-10-CM

## 2024-07-11 DIAGNOSIS — M79.18 MYOFASCIAL PAIN: ICD-10-CM

## 2024-07-11 DIAGNOSIS — M96.1 FAILED BACK SURGICAL SYNDROME: ICD-10-CM

## 2024-07-11 DIAGNOSIS — M70.61 TROCHANTERIC BURSITIS OF BOTH HIPS: ICD-10-CM

## 2024-07-11 DIAGNOSIS — M54.16 LUMBAR RADICULOPATHY: ICD-10-CM

## 2024-07-11 DIAGNOSIS — M51.36 DDD (DEGENERATIVE DISC DISEASE), LUMBAR: ICD-10-CM

## 2024-07-11 DIAGNOSIS — G89.4 CHRONIC PAIN SYNDROME: ICD-10-CM

## 2024-07-11 PROCEDURE — 99214 OFFICE O/P EST MOD 30 MIN: CPT | Performed by: INTERNAL MEDICINE

## 2024-07-11 RX ORDER — HYDROCODONE BITARTRATE AND ACETAMINOPHEN 7.5; 325 MG/1; MG/1
1 TABLET ORAL 3 TIMES DAILY PRN
Qty: 105 TABLET | Refills: 0 | Status: SHIPPED | OUTPATIENT
Start: 2024-07-11 | End: 2024-08-08

## 2024-07-11 RX ORDER — MELOXICAM 15 MG/1
15 TABLET ORAL DAILY
Qty: 30 TABLET | Refills: 1 | Status: SHIPPED | OUTPATIENT
Start: 2024-07-11

## 2024-07-11 NOTE — PROGRESS NOTES
either with assist device if recommended or with medications.  Improve mood/ anxiety and depression symptoms such as crying spells, low energy, problems with concentration, motivation.- she is maintaining her treatment goal with the current regimen.    3.   Reduction of reliance on opioid analgesia/more appropriate opioid use. Using the least effective dose to help with pain control and making a concerted effort to decrease the dose when possible.- she is maintaining her treatment goal with the current regimen.    4.   Ability to focus/concentrate at work and perform the duties required of her at work  Sit through a workday without lower extremity symptoms.  Stand 30-60 minutes without lower extremity symptoms.  Ability to lift up to 10-20 lbs. Ability to go up and down stairs.  Sit 30-60 minutes  Without having to stand up frequently. - she is maintaining/progressing towards her work related goals with the current regimen. - she is maintaining her treatment goal with the current regimen.     Risks and benefits of the medications and other alternative treatments have been/were  discussed with the patient. Any questions on the  common side effects of these medications were also answered.  She was advised against drinking alcohol with the narcotic pain medicines, advised against driving or handling machinery when  starting or adjusting the dose of medicines, feeling groggy or drowsy, or if having any cognitive issues related to the current medications. Sheis fully aware of the risk of overdose and death, if medicines are misused and not taken as prescribed. If she develops new symptoms or if the symptoms worsen, she was told to call the office. .  Thank you for allowing me to participate in the care of this patient.    Josh Tay MD    Cc: Funmilayo Lake DO

## 2024-08-05 ENCOUNTER — TELEPHONE (OUTPATIENT)
Dept: PAIN MANAGEMENT | Age: 54
End: 2024-08-05

## 2024-08-06 NOTE — TELEPHONE ENCOUNTER
Per [facility], patient can come in on Thursday, no extension on medications.   Called and spoke to patient to advise her that she can come in on Thursday, she stated that school starts and won't be able to come in on that day, I advise her that she can come in tomorrow, she stated that she can come in on that day at 12:15. I advised her that we are at the [place] location (address given).

## 2024-08-07 ENCOUNTER — OFFICE VISIT (OUTPATIENT)
Dept: PAIN MANAGEMENT | Age: 54
End: 2024-08-07

## 2024-08-07 VITALS
HEART RATE: 114 BPM | WEIGHT: 224 LBS | DIASTOLIC BLOOD PRESSURE: 83 MMHG | SYSTOLIC BLOOD PRESSURE: 132 MMHG | OXYGEN SATURATION: 98 % | BODY MASS INDEX: 35.08 KG/M2

## 2024-08-07 DIAGNOSIS — M79.18 MYOFASCIAL PAIN: ICD-10-CM

## 2024-08-07 DIAGNOSIS — G89.4 CHRONIC PAIN SYNDROME: ICD-10-CM

## 2024-08-07 DIAGNOSIS — R10.84 GENERALIZED ABDOMINAL PAIN: ICD-10-CM

## 2024-08-07 DIAGNOSIS — M16.11 PRIMARY OSTEOARTHRITIS OF RIGHT HIP: ICD-10-CM

## 2024-08-07 DIAGNOSIS — M96.1 FAILED BACK SURGICAL SYNDROME: ICD-10-CM

## 2024-08-07 DIAGNOSIS — M70.62 TROCHANTERIC BURSITIS OF BOTH HIPS: ICD-10-CM

## 2024-08-07 DIAGNOSIS — M47.816 FACET SYNDROME, LUMBAR: ICD-10-CM

## 2024-08-07 DIAGNOSIS — M17.11 PRIMARY OSTEOARTHRITIS OF RIGHT KNEE: ICD-10-CM

## 2024-08-07 DIAGNOSIS — M70.61 TROCHANTERIC BURSITIS OF BOTH HIPS: ICD-10-CM

## 2024-08-07 DIAGNOSIS — M79.7 FIBROMYALGIA: ICD-10-CM

## 2024-08-07 DIAGNOSIS — M54.16 LUMBAR RADICULOPATHY: ICD-10-CM

## 2024-08-07 DIAGNOSIS — M51.36 DDD (DEGENERATIVE DISC DISEASE), LUMBAR: ICD-10-CM

## 2024-08-07 RX ORDER — HYDROCODONE BITARTRATE AND ACETAMINOPHEN 7.5; 325 MG/1; MG/1
1 TABLET ORAL 3 TIMES DAILY PRN
Qty: 105 TABLET | Refills: 0 | Status: SHIPPED | OUTPATIENT
Start: 2024-08-07 | End: 2024-09-04

## 2024-08-07 RX ORDER — MELOXICAM 15 MG/1
15 TABLET ORAL DAILY
Qty: 30 TABLET | Refills: 1 | Status: SHIPPED | OUTPATIENT
Start: 2024-08-07

## 2024-08-07 NOTE — PROGRESS NOTES
Dorinda Sánchez  1970  [id number]      HISTORY OF PRESENT ILLNESS:  Ms. Sánchez is a 53 y.o. female returns for a follow up visit for pain management  She has a diagnosis of   1. Chronic pain syndrome    2. DDD (degenerative disc disease), lumbar    3. Lumbar radiculopathy    4. Primary osteoarthritis of right knee    5. Fibromyalgia    6. Facet syndrome, lumbar    7. Primary osteoarthritis of right hip    8. Failed back surgical syndrome    9. Trochanteric bursitis of both hips    10. Myofascial pain    11. Generalized abdominal pain    .      As per information/history obtained from the PADT(patient assessment and documentation tool) -  She complains of pain in the lower back and buttocks She rates the pain 4/10 and describes it as aching.  Pain is made worse by: movement, walking, standing, sitting, bending. She denies any side effects from the current pain regimen. Patient reports that since last follow up visit the physical functioning is unchanged, family/social relationships are unchanged, mood is unchanged sleep patterns are unchanged. Ms. Sánchez states that since starting the treatment with the current regimen the  overall functioning  in the above aspects is  better, Patient denies misusing/abusing her narcotic pain medications or using any illegal drugs.  There are No indicators for possible drug abuse, addiction or diversion problems. Upon obtaining the medical history from Ms. Sánchez regarding today's office visit for her presenting problems, patient states she has been doing fair, she is managing okay with the medications. Ms. Sánchez states she started work again after the summer. Patient states she is using all the adjuvants along with Norco 3 per day. She says she has been unpacking after moving back into her home.       ALLERGIES: Patients list of allergies were reviewed     MEDICATIONS: Ms. Sánchez list of medications were reviewed.Her current medications are   Outpatient Medications Prior to

## 2024-08-08 ENCOUNTER — TELEPHONE (OUTPATIENT)
Dept: PAIN MANAGEMENT | Age: 54
End: 2024-08-08

## 2024-08-08 NOTE — TELEPHONE ENCOUNTER
Pt said rx for hydrocodone was written wrong.  Pharm received  rx # [id number] and should say for 5 wks, not 28 days.    [facility] [phone]

## 2024-08-08 NOTE — TELEPHONE ENCOUNTER
Called and spoke to Nayana at the Pharmacy, she stated that the Rx does not match up with the quantity, I advised her that it should be for 35 days, she voiced her understanding and will get it ready for the patient

## 2024-09-03 DIAGNOSIS — G89.4 CHRONIC PAIN SYNDROME: ICD-10-CM

## 2024-09-09 ENCOUNTER — OFFICE VISIT (OUTPATIENT)
Dept: PAIN MANAGEMENT | Age: 54
End: 2024-09-09

## 2024-09-09 VITALS
DIASTOLIC BLOOD PRESSURE: 84 MMHG | HEART RATE: 111 BPM | BODY MASS INDEX: 35.08 KG/M2 | OXYGEN SATURATION: 98 % | WEIGHT: 224 LBS | SYSTOLIC BLOOD PRESSURE: 138 MMHG

## 2024-09-09 DIAGNOSIS — M54.16 LUMBAR RADICULOPATHY: ICD-10-CM

## 2024-09-09 DIAGNOSIS — M70.61 TROCHANTERIC BURSITIS OF BOTH HIPS: ICD-10-CM

## 2024-09-09 DIAGNOSIS — M16.11 PRIMARY OSTEOARTHRITIS OF RIGHT HIP: ICD-10-CM

## 2024-09-09 DIAGNOSIS — M17.11 PRIMARY OSTEOARTHRITIS OF RIGHT KNEE: ICD-10-CM

## 2024-09-09 DIAGNOSIS — R10.84 GENERALIZED ABDOMINAL PAIN: ICD-10-CM

## 2024-09-09 DIAGNOSIS — M79.7 FIBROMYALGIA: ICD-10-CM

## 2024-09-09 DIAGNOSIS — M51.36 DDD (DEGENERATIVE DISC DISEASE), LUMBAR: ICD-10-CM

## 2024-09-09 DIAGNOSIS — M79.18 MYOFASCIAL PAIN: ICD-10-CM

## 2024-09-09 DIAGNOSIS — F51.01 PRIMARY INSOMNIA: ICD-10-CM

## 2024-09-09 DIAGNOSIS — M96.1 FAILED BACK SURGICAL SYNDROME: ICD-10-CM

## 2024-09-09 DIAGNOSIS — G89.4 CHRONIC PAIN SYNDROME: ICD-10-CM

## 2024-09-09 DIAGNOSIS — F39 MOOD DISORDER (HCC): ICD-10-CM

## 2024-09-09 DIAGNOSIS — M47.816 FACET SYNDROME, LUMBAR: ICD-10-CM

## 2024-09-09 DIAGNOSIS — M70.62 TROCHANTERIC BURSITIS OF BOTH HIPS: ICD-10-CM

## 2024-09-09 RX ORDER — NORTRIPTYLINE HCL 25 MG
CAPSULE ORAL
Qty: 60 CAPSULE | Refills: 1 | OUTPATIENT
Start: 2024-09-09

## 2024-09-09 RX ORDER — PREGABALIN 150 MG/1
150 CAPSULE ORAL 2 TIMES DAILY
Qty: 60 CAPSULE | Refills: 1 | Status: SHIPPED | OUTPATIENT
Start: 2024-09-09 | End: 2024-11-08

## 2024-09-09 RX ORDER — HYDROCODONE BITARTRATE AND ACETAMINOPHEN 7.5; 325 MG/1; MG/1
1 TABLET ORAL 3 TIMES DAILY PRN
Qty: 105 TABLET | Refills: 0 | Status: SHIPPED | OUTPATIENT
Start: 2024-09-09 | End: 2024-10-14

## 2024-09-09 RX ORDER — DULOXETIN HYDROCHLORIDE 60 MG/1
60 CAPSULE, DELAYED RELEASE ORAL DAILY
Qty: 30 CAPSULE | Refills: 1 | Status: SHIPPED | OUTPATIENT
Start: 2024-09-09

## 2024-09-09 RX ORDER — NORTRIPTYLINE HCL 25 MG
25-50 CAPSULE ORAL NIGHTLY
Qty: 60 CAPSULE | Refills: 1 | Status: SHIPPED | OUTPATIENT
Start: 2024-09-09

## 2024-09-09 RX ORDER — LIDOCAINE 36 MG/1
PATCH TOPICAL
Qty: 60 PATCH | Refills: 0 | Status: SHIPPED | OUTPATIENT
Start: 2024-09-09

## 2024-09-09 RX ORDER — MELOXICAM 15 MG/1
15 TABLET ORAL DAILY
Qty: 30 TABLET | Refills: 1 | Status: SHIPPED | OUTPATIENT
Start: 2024-09-09

## 2024-10-14 ENCOUNTER — OFFICE VISIT (OUTPATIENT)
Dept: PAIN MANAGEMENT | Age: 54
End: 2024-10-14

## 2024-10-14 VITALS
SYSTOLIC BLOOD PRESSURE: 133 MMHG | WEIGHT: 225.8 LBS | HEART RATE: 75 BPM | DIASTOLIC BLOOD PRESSURE: 91 MMHG | BODY MASS INDEX: 35.37 KG/M2 | OXYGEN SATURATION: 95 %

## 2024-10-14 DIAGNOSIS — G89.4 CHRONIC PAIN SYNDROME: ICD-10-CM

## 2024-10-14 DIAGNOSIS — M25.552 LEFT HIP PAIN: ICD-10-CM

## 2024-10-14 DIAGNOSIS — M96.1 FAILED BACK SURGICAL SYNDROME: ICD-10-CM

## 2024-10-14 DIAGNOSIS — M54.16 LUMBAR RADICULOPATHY: ICD-10-CM

## 2024-10-14 DIAGNOSIS — M17.11 PRIMARY OSTEOARTHRITIS OF RIGHT KNEE: ICD-10-CM

## 2024-10-14 DIAGNOSIS — R10.84 GENERALIZED ABDOMINAL PAIN: ICD-10-CM

## 2024-10-14 DIAGNOSIS — M47.816 FACET SYNDROME, LUMBAR: ICD-10-CM

## 2024-10-14 DIAGNOSIS — M51.362 DEGENERATION OF INTERVERTEBRAL DISC OF LUMBAR REGION WITH DISCOGENIC BACK PAIN AND LOWER EXTREMITY PAIN: ICD-10-CM

## 2024-10-14 DIAGNOSIS — M79.18 MYOFASCIAL PAIN: ICD-10-CM

## 2024-10-14 DIAGNOSIS — M70.61 TROCHANTERIC BURSITIS OF BOTH HIPS: ICD-10-CM

## 2024-10-14 DIAGNOSIS — F39 MOOD DISORDER (HCC): ICD-10-CM

## 2024-10-14 DIAGNOSIS — F51.01 PRIMARY INSOMNIA: ICD-10-CM

## 2024-10-14 DIAGNOSIS — M16.11 PRIMARY OSTEOARTHRITIS OF RIGHT HIP: ICD-10-CM

## 2024-10-14 DIAGNOSIS — M70.62 TROCHANTERIC BURSITIS OF BOTH HIPS: ICD-10-CM

## 2024-10-14 RX ORDER — MELOXICAM 15 MG/1
15 TABLET ORAL DAILY
Qty: 30 TABLET | Refills: 1 | Status: SHIPPED | OUTPATIENT
Start: 2024-10-14

## 2024-10-14 RX ORDER — HYDROCODONE BITARTRATE AND ACETAMINOPHEN 7.5; 325 MG/1; MG/1
1 TABLET ORAL 3 TIMES DAILY PRN
Qty: 105 TABLET | Refills: 0 | Status: SHIPPED | OUTPATIENT
Start: 2024-10-14 | End: 2024-11-18

## 2024-10-14 RX ORDER — METHYLPREDNISOLONE 4 MG
TABLET, DOSE PACK ORAL
Qty: 1 KIT | Refills: 0 | Status: SHIPPED | OUTPATIENT
Start: 2024-10-14

## 2024-10-14 RX ORDER — DULOXETIN HYDROCHLORIDE 60 MG/1
60 CAPSULE, DELAYED RELEASE ORAL DAILY
Qty: 30 CAPSULE | Refills: 1 | Status: SHIPPED | OUTPATIENT
Start: 2024-10-14

## 2024-10-14 RX ORDER — NORTRIPTYLINE HCL 25 MG
25-50 CAPSULE ORAL NIGHTLY
Qty: 60 CAPSULE | Refills: 1 | Status: SHIPPED | OUTPATIENT
Start: 2024-10-14

## 2024-10-14 RX ORDER — PREGABALIN 150 MG/1
150 CAPSULE ORAL 2 TIMES DAILY
Qty: 60 CAPSULE | Refills: 1 | Status: SHIPPED | OUTPATIENT
Start: 2024-10-14 | End: 2024-12-13

## 2024-10-14 NOTE — PROGRESS NOTES
managing pain  -Reducing pain intensity  Employing techniques of  - Exercise, pacing- relaxation therapies such as biofeedback, mindfulness based stress reduction, imagery, cognitive restructuring, behavioral activation and problem solving   -Start water exercises     Medications/orders associated with this visit:  Current Outpatient Medications   Medication Sig Dispense Refill    DULoxetine (CYMBALTA) 60 MG extended release capsule Take 1 capsule by mouth daily 30 capsule 1    meloxicam (MOBIC) 15 MG tablet Take 1 tablet by mouth daily 30 tablet 1    nortriptyline (PAMELOR) 25 MG capsule Take 1-2 capsules by mouth nightly 60 capsule 1    pregabalin (LYRICA) 150 MG capsule Take 1 capsule by mouth 2 times daily for 60 days. 60 capsule 1    HYDROcodone-acetaminophen (NORCO) 7.5-325 MG per tablet Take 1 tablet by mouth 3 times daily as needed for Pain for up to 35 days. 105 tablet 0    Lidocaine (ZTLIDO) 1.8 % PTCH Apply 1 patch daily every 12 hours on 12 hours off 60 patch 0    diclofenac sodium (VOLTAREN) 1 % GEL Apply 4 g topically 4 times daily 350 g 0    vitamin D (VITAMIN D3) 25 MCG (1000 UT) TABS tablet Take 1 tablet by mouth daily 30 tablet 0    acetaminophen (TYLENOL) 500 MG tablet Take 2 tablets by mouth 3 times daily as needed for Pain 180 tablet 0    aspirin 81 MG chewable tablet Take 1 tablet by mouth 2 times daily 60 tablet 0    fluticasone (FLONASE) 50 MCG/ACT nasal spray 2 sprays by Nasal route daily       No current facility-administered medications for this visit.        Goals of current treatment regimen include improvement in pain, restoration of functioning- with focus on improvement in physical performance, general activity, work or disability,emotional distress, health care utilization and  decreased medication consumption.   Will continue to monitor progress towards achieving/maintaining therapeutic goals with special emphasis on  Improvement in perceived interfernce  of pain with ADL's. Ability

## 2024-10-24 ENCOUNTER — TELEPHONE (OUTPATIENT)
Dept: PAIN MANAGEMENT | Age: 54
End: 2024-10-24

## 2024-10-24 NOTE — TELEPHONE ENCOUNTER
Pt calling stating that [facility] is telling her that the MRI needs to state with AND without contrast and it only states with contrast. Could the order be edited and resubmitted.     [facility] [phone]    Please call pt when order is fixed

## 2024-10-25 NOTE — TELEPHONE ENCOUNTER
Called patient LVM. Please be advised the patient when she calls please inform her to give us the correct number to [facility] imaging so we can faxed her MRI over to them.    Please be advised.

## 2024-11-18 ENCOUNTER — OFFICE VISIT (OUTPATIENT)
Dept: PAIN MANAGEMENT | Age: 54
End: 2024-11-18
Payer: COMMERCIAL

## 2024-11-18 VITALS
HEART RATE: 100 BPM | SYSTOLIC BLOOD PRESSURE: 136 MMHG | WEIGHT: 225 LBS | DIASTOLIC BLOOD PRESSURE: 84 MMHG | OXYGEN SATURATION: 96 % | BODY MASS INDEX: 35.24 KG/M2

## 2024-11-18 DIAGNOSIS — M79.18 MYOFASCIAL PAIN: ICD-10-CM

## 2024-11-18 DIAGNOSIS — M70.61 TROCHANTERIC BURSITIS OF BOTH HIPS: ICD-10-CM

## 2024-11-18 DIAGNOSIS — M16.11 PRIMARY OSTEOARTHRITIS OF RIGHT HIP: ICD-10-CM

## 2024-11-18 DIAGNOSIS — G89.4 CHRONIC PAIN SYNDROME: ICD-10-CM

## 2024-11-18 DIAGNOSIS — M51.362 DEGENERATION OF INTERVERTEBRAL DISC OF LUMBAR REGION WITH DISCOGENIC BACK PAIN AND LOWER EXTREMITY PAIN: ICD-10-CM

## 2024-11-18 DIAGNOSIS — M17.11 PRIMARY OSTEOARTHRITIS OF RIGHT KNEE: ICD-10-CM

## 2024-11-18 DIAGNOSIS — M54.16 LUMBAR RADICULOPATHY: ICD-10-CM

## 2024-11-18 DIAGNOSIS — M70.62 TROCHANTERIC BURSITIS OF BOTH HIPS: ICD-10-CM

## 2024-11-18 DIAGNOSIS — M47.816 FACET SYNDROME, LUMBAR: ICD-10-CM

## 2024-11-18 DIAGNOSIS — M96.1 FAILED BACK SURGICAL SYNDROME: ICD-10-CM

## 2024-11-18 DIAGNOSIS — R10.84 GENERALIZED ABDOMINAL PAIN: ICD-10-CM

## 2024-11-18 DIAGNOSIS — M25.552 LEFT HIP PAIN: ICD-10-CM

## 2024-11-18 PROCEDURE — 99213 OFFICE O/P EST LOW 20 MIN: CPT | Performed by: INTERNAL MEDICINE

## 2024-11-18 RX ORDER — DULOXETIN HYDROCHLORIDE 60 MG/1
60 CAPSULE, DELAYED RELEASE ORAL DAILY
Qty: 30 CAPSULE | Refills: 1 | Status: SHIPPED | OUTPATIENT
Start: 2024-11-18

## 2024-11-18 RX ORDER — MELOXICAM 15 MG/1
15 TABLET ORAL DAILY
Qty: 30 TABLET | Refills: 1 | Status: SHIPPED | OUTPATIENT
Start: 2024-11-18

## 2024-11-18 RX ORDER — HYDROCODONE BITARTRATE AND ACETAMINOPHEN 7.5; 325 MG/1; MG/1
1 TABLET ORAL 3 TIMES DAILY PRN
Qty: 105 TABLET | Refills: 0 | Status: SHIPPED | OUTPATIENT
Start: 2024-11-18 | End: 2024-12-23

## 2024-11-18 NOTE — PROGRESS NOTES
Dorinda Sánchez  1970  [id number]      HISTORY OF PRESENT ILLNESS:  Ms. Sánchez is a 54 y.o. female returns for a follow up visit for pain management  She has a diagnosis of   1. Chronic pain syndrome    2. Facet syndrome, lumbar    3. Trochanteric bursitis of both hips    4. Primary insomnia    5. Degeneration of intervertebral disc of lumbar region with discogenic back pain and lower extremity pain    6. Primary osteoarthritis of right knee    7. Generalized abdominal pain    8. Left hip pain    9. Lumbar radiculopathy    10. Failed back surgical syndrome    11. Myofascial pain    .      As per information/history obtained from the PADT(patient assessment and documentation tool) -  She complains of pain in the lower back and knees Right with radiation to the buttocks and hips Bilateral She rates the pain 4/10 and describes it as aching.  Pain is made worse by: movement. She denies any side effects from the current pain regimen. Patient reports that since last follow up visit the physical functioning is unchanged, family/social relationships are unchanged, mood is unchanged sleep patterns are unchanged. Ms. Sánchez states that since starting the treatment with the current regimen the  overall functioning  in the above aspects is  better, Patient denies misusing/abusing her narcotic pain medications or using any illegal drugs.  There are No indicators for possible drug abuse, addiction or diversion problems.   Upon obtaining the medical history from Ms. Sánchez regarding today's office visit for her presenting problems, patient reports she's is going for a MRI of lumbar spine tomorrow. 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 She complains the pain is worse in the back than legs. She complains her right knee hurts still, status post total knee replacement 9 months ago. She denies any side effects with the medications. She says she's using Norco 3 per day. She mentions she's using Zt Lido patches as

## 2024-11-19 ENCOUNTER — HOSPITAL ENCOUNTER (OUTPATIENT)
Dept: MRI IMAGING | Age: 54
Discharge: HOME | End: 2024-11-19
Payer: COMMERCIAL

## 2024-11-19 DIAGNOSIS — M54.16 LUMBAR RADICULOPATHY: ICD-10-CM

## 2024-11-19 DIAGNOSIS — M51.362 DEGENERATION OF INTERVERTEBRAL DISC OF LUMBAR REGION WITH DISCOGENIC BACK PAIN AND LOWER EXTREMITY PAIN: ICD-10-CM

## 2024-11-19 DIAGNOSIS — M47.816 FACET SYNDROME, LUMBAR: ICD-10-CM

## 2024-11-19 PROCEDURE — 72158 MRI LUMBAR SPINE W/O & W/DYE: CPT

## 2024-11-19 PROCEDURE — 6360000004 HC RX CONTRAST MEDICATION: Performed by: INTERNAL MEDICINE

## 2024-11-19 PROCEDURE — A9579 GAD-BASE MR CONTRAST NOS,1ML: HCPCS | Performed by: INTERNAL MEDICINE

## 2024-11-19 RX ADMIN — GADOTERIDOL 20 ML: 279.3 INJECTION, SOLUTION INTRAVENOUS at 13:44

## 2024-12-23 ENCOUNTER — OFFICE VISIT (OUTPATIENT)
Dept: PAIN MANAGEMENT | Age: 54
End: 2024-12-23
Payer: COMMERCIAL

## 2024-12-23 VITALS
DIASTOLIC BLOOD PRESSURE: 79 MMHG | BODY MASS INDEX: 35.71 KG/M2 | OXYGEN SATURATION: 98 % | HEART RATE: 67 BPM | WEIGHT: 228 LBS | SYSTOLIC BLOOD PRESSURE: 129 MMHG

## 2024-12-23 DIAGNOSIS — M16.11 PRIMARY OSTEOARTHRITIS OF RIGHT HIP: ICD-10-CM

## 2024-12-23 DIAGNOSIS — M47.816 FACET SYNDROME, LUMBAR: ICD-10-CM

## 2024-12-23 DIAGNOSIS — R10.84 GENERALIZED ABDOMINAL PAIN: ICD-10-CM

## 2024-12-23 DIAGNOSIS — G89.4 CHRONIC PAIN SYNDROME: ICD-10-CM

## 2024-12-23 DIAGNOSIS — M70.61 TROCHANTERIC BURSITIS OF BOTH HIPS: ICD-10-CM

## 2024-12-23 DIAGNOSIS — M79.18 MYOFASCIAL PAIN: ICD-10-CM

## 2024-12-23 DIAGNOSIS — M96.1 FAILED BACK SURGICAL SYNDROME: ICD-10-CM

## 2024-12-23 DIAGNOSIS — M54.16 LUMBAR RADICULOPATHY: ICD-10-CM

## 2024-12-23 DIAGNOSIS — M17.11 PRIMARY OSTEOARTHRITIS OF RIGHT KNEE: ICD-10-CM

## 2024-12-23 DIAGNOSIS — M25.552 LEFT HIP PAIN: ICD-10-CM

## 2024-12-23 DIAGNOSIS — M70.62 TROCHANTERIC BURSITIS OF BOTH HIPS: ICD-10-CM

## 2024-12-23 DIAGNOSIS — M51.362 DEGENERATION OF INTERVERTEBRAL DISC OF LUMBAR REGION WITH DISCOGENIC BACK PAIN AND LOWER EXTREMITY PAIN: ICD-10-CM

## 2024-12-23 PROCEDURE — 99213 OFFICE O/P EST LOW 20 MIN: CPT | Performed by: INTERNAL MEDICINE

## 2024-12-23 RX ORDER — HYDROCODONE BITARTRATE AND ACETAMINOPHEN 7.5; 325 MG/1; MG/1
1 TABLET ORAL 3 TIMES DAILY PRN
Qty: 105 TABLET | Refills: 0 | Status: SHIPPED | OUTPATIENT
Start: 2024-12-23 | End: 2025-01-27

## 2024-12-23 RX ORDER — DULOXETIN HYDROCHLORIDE 60 MG/1
60 CAPSULE, DELAYED RELEASE ORAL DAILY
Qty: 30 CAPSULE | Refills: 1 | Status: SHIPPED | OUTPATIENT
Start: 2024-12-23

## 2024-12-23 RX ORDER — MELOXICAM 15 MG/1
15 TABLET ORAL DAILY
Qty: 30 TABLET | Refills: 1 | Status: SHIPPED | OUTPATIENT
Start: 2024-12-23

## 2024-12-23 NOTE — PROGRESS NOTES
Dorinda Sánchez  1970  [id number]      HISTORY OF PRESENT ILLNESS:  Ms. Sánchez is a 54 y.o. female returns for a follow up visit for pain management  She has a diagnosis of   1. Chronic pain syndrome    2. Degeneration of intervertebral disc of lumbar region with discogenic back pain and lower extremity pain    3. Left hip pain    4. Myofascial pain    5. Facet syndrome, lumbar    6. Primary osteoarthritis of right knee    7. Lumbar radiculopathy    8. Primary osteoarthritis of right hip    9. Trochanteric bursitis of both hips    10. Generalized abdominal pain    11. Failed back surgical syndrome    .      As per information/history obtained from the PADT(patient assessment and documentation tool) -  She complains of pain in the lower back and knees Right with radiation to the buttocks She rates the pain 7/10 and describes it as aching.  Pain is made worse by: movement, walking. She denies any side effects from the current pain regimen. Patient reports that since last follow up visit the physical functioning is worse, family/social relationships are unchanged, mood is unchanged sleep patterns are worse. Ms. Sánchez states that since starting the treatment with the current regimen the  overall functioning  in the above aspects is  better, Patient denies misusing/abusing her narcotic pain medications or using any illegal drugs.  There are No indicators for possible drug abuse, addiction or diversion problems.   Upon obtaining the medical history from Ms. Sánchez regarding today's office visit for her presenting problems, patient states she had a MRI done of the lumbar spine. Ms. Sánchez complains of her back hurting and going into buttock and leg, she states she can't lift it. She states she has been compliant with her regimen. Patient mentions she is using Norco 3 per day along with Lyrica and Cymbalta.       ALLERGIES: Patients list of allergies were reviewed     MEDICATIONS: Ms. Sánchez list of medications were

## 2025-01-19 DIAGNOSIS — G89.4 CHRONIC PAIN SYNDROME: ICD-10-CM

## 2025-01-19 DIAGNOSIS — F51.01 PRIMARY INSOMNIA: ICD-10-CM

## 2025-01-20 RX ORDER — NORTRIPTYLINE HYDROCHLORIDE 25 MG/1
CAPSULE ORAL
Qty: 60 CAPSULE | Refills: 1 | OUTPATIENT
Start: 2025-01-20

## 2025-01-27 ENCOUNTER — OFFICE VISIT (OUTPATIENT)
Dept: PAIN MANAGEMENT | Age: 55
End: 2025-01-27
Payer: COMMERCIAL

## 2025-01-27 VITALS
SYSTOLIC BLOOD PRESSURE: 147 MMHG | DIASTOLIC BLOOD PRESSURE: 87 MMHG | OXYGEN SATURATION: 100 % | HEART RATE: 100 BPM | WEIGHT: 231 LBS | BODY MASS INDEX: 36.18 KG/M2

## 2025-01-27 DIAGNOSIS — M17.11 PRIMARY OSTEOARTHRITIS OF RIGHT KNEE: ICD-10-CM

## 2025-01-27 DIAGNOSIS — M96.1 FAILED BACK SURGICAL SYNDROME: ICD-10-CM

## 2025-01-27 DIAGNOSIS — M70.62 TROCHANTERIC BURSITIS OF BOTH HIPS: ICD-10-CM

## 2025-01-27 DIAGNOSIS — M70.61 TROCHANTERIC BURSITIS OF BOTH HIPS: ICD-10-CM

## 2025-01-27 DIAGNOSIS — M79.18 MYOFASCIAL PAIN: ICD-10-CM

## 2025-01-27 DIAGNOSIS — M54.16 LUMBAR RADICULOPATHY: ICD-10-CM

## 2025-01-27 DIAGNOSIS — M16.11 PRIMARY OSTEOARTHRITIS OF RIGHT HIP: ICD-10-CM

## 2025-01-27 DIAGNOSIS — R10.84 GENERALIZED ABDOMINAL PAIN: ICD-10-CM

## 2025-01-27 DIAGNOSIS — F51.01 PRIMARY INSOMNIA: ICD-10-CM

## 2025-01-27 DIAGNOSIS — F39 MOOD DISORDER (HCC): ICD-10-CM

## 2025-01-27 DIAGNOSIS — M47.816 FACET SYNDROME, LUMBAR: ICD-10-CM

## 2025-01-27 DIAGNOSIS — M79.7 FIBROMYALGIA: ICD-10-CM

## 2025-01-27 DIAGNOSIS — M51.362 DEGENERATION OF INTERVERTEBRAL DISC OF LUMBAR REGION WITH DISCOGENIC BACK PAIN AND LOWER EXTREMITY PAIN: ICD-10-CM

## 2025-01-27 DIAGNOSIS — G89.4 CHRONIC PAIN SYNDROME: ICD-10-CM

## 2025-01-27 PROCEDURE — 99214 OFFICE O/P EST MOD 30 MIN: CPT | Performed by: INTERNAL MEDICINE

## 2025-01-27 RX ORDER — MELOXICAM 15 MG/1
15 TABLET ORAL DAILY
Qty: 30 TABLET | Refills: 1 | Status: SHIPPED | OUTPATIENT
Start: 2025-01-27

## 2025-01-27 RX ORDER — NORTRIPTYLINE HYDROCHLORIDE 25 MG/1
25-50 CAPSULE ORAL NIGHTLY
Qty: 60 CAPSULE | Refills: 1 | Status: SHIPPED | OUTPATIENT
Start: 2025-01-27

## 2025-01-27 RX ORDER — HYDROCODONE BITARTRATE AND ACETAMINOPHEN 7.5; 325 MG/1; MG/1
1 TABLET ORAL 3 TIMES DAILY PRN
Qty: 105 TABLET | Refills: 0 | Status: SHIPPED | OUTPATIENT
Start: 2025-01-27 | End: 2025-03-03

## 2025-01-27 RX ORDER — DULOXETIN HYDROCHLORIDE 60 MG/1
60 CAPSULE, DELAYED RELEASE ORAL DAILY
Qty: 30 CAPSULE | Refills: 1 | Status: SHIPPED | OUTPATIENT
Start: 2025-01-27

## 2025-01-27 RX ORDER — PREGABALIN 150 MG/1
150 CAPSULE ORAL 2 TIMES DAILY
Qty: 60 CAPSULE | Refills: 1 | Status: SHIPPED | OUTPATIENT
Start: 2025-01-27 | End: 2025-03-28

## 2025-01-27 NOTE — PROGRESS NOTES
Dorinda Sánchez  1970  [id number]    HISTORY OF PRESENT ILLNESS:  Ms. Sánchez is a 54 y.o. female returns for a follow up visit for multiple medical problems.  Her  presenting problems are   1. Chronic pain syndrome    2. Primary insomnia    3. Degeneration of intervertebral disc of lumbar region with discogenic back pain and lower extremity pain    4. Myofascial pain    5. Primary osteoarthritis of right knee    6. Facet syndrome, lumbar    7. Lumbar radiculopathy    8. Primary osteoarthritis of right hip    9. Trochanteric bursitis of both hips    10. Fibromyalgia    11. Mood disorder (HCC)    12. Failed back surgical syndrome    13. Generalized abdominal pain    .    As per information/history obtained from the PADT(patient assessment and documentation tool) -  She complains of pain in the lower back and knees Right with radiation to the buttocks She rates the pain 5/10 and describes it as aching.  Pain is made worse by: movement, walking.  Current treatment regimen has helped relieve about 50% of the pain.  She denies side effects from the current pain regimen.   Patient reports that since last follow up visit the physical functioning is worse, family/social relationships are unchanged, mood is unchanged sleep patterns are unchanged.  Ms. Sánchez states that since starting the treatment with the current regimen the  overall functioning  in the above aspects is  better,Patient denies neurological bowel or bladder. Patient denies misusing/abusing her narcotic pain medications or using any illegal drugs.  There are No indicators for possible drug abuse, addiction or diversion problems.     Upon obtaining the medical history from Ms. Sánchez regarding today's office visit for her presenting problems, Patient reports she has been doing fair, had some back flare up last month and had to miss work. She states she has been doing more physical activities. She complains her right knee still hurting, status post TKR 1

## 2025-02-05 ENCOUNTER — OFFICE VISIT (OUTPATIENT)
Dept: ORTHOPEDIC SURGERY | Age: 55
End: 2025-02-05
Payer: COMMERCIAL

## 2025-02-05 VITALS — WEIGHT: 231 LBS | HEIGHT: 67 IN | BODY MASS INDEX: 36.26 KG/M2

## 2025-02-05 DIAGNOSIS — Z96.651 S/P TOTAL KNEE ARTHROPLASTY, RIGHT: Primary | ICD-10-CM

## 2025-02-05 DIAGNOSIS — M25.561 ACUTE PAIN OF RIGHT KNEE: ICD-10-CM

## 2025-02-05 PROCEDURE — 99214 OFFICE O/P EST MOD 30 MIN: CPT | Performed by: STUDENT IN AN ORGANIZED HEALTH CARE EDUCATION/TRAINING PROGRAM

## 2025-02-05 PROCEDURE — 20610 DRAIN/INJ JOINT/BURSA W/O US: CPT | Performed by: STUDENT IN AN ORGANIZED HEALTH CARE EDUCATION/TRAINING PROGRAM

## 2025-02-05 RX ORDER — TRIAMCINOLONE ACETONIDE 40 MG/ML
40 INJECTION, SUSPENSION INTRA-ARTICULAR; INTRAMUSCULAR ONCE
Status: COMPLETED | OUTPATIENT
Start: 2025-02-05 | End: 2025-02-05

## 2025-02-05 RX ORDER — LIDOCAINE HYDROCHLORIDE 10 MG/ML
4 INJECTION, SOLUTION INFILTRATION; PERINEURAL ONCE
Status: COMPLETED | OUTPATIENT
Start: 2025-02-05 | End: 2025-02-05

## 2025-02-05 RX ADMIN — LIDOCAINE HYDROCHLORIDE 4 ML: 10 INJECTION, SOLUTION INFILTRATION; PERINEURAL at 11:07

## 2025-02-05 RX ADMIN — TRIAMCINOLONE ACETONIDE 40 MG: 40 INJECTION, SUSPENSION INTRA-ARTICULAR; INTRAMUSCULAR at 11:07

## 2025-02-05 NOTE — PROGRESS NOTES
Dr Arnulfo Diop      Date /Time 2/5/2025       1:02 PM EST  Name Dorinda Sánchez             1970   Location  [facility]TH[facility] SURG  MRN [id number]                Chief Complaint   Patient presents with    Knee Pain     Right knee        History of Present Illness  Dorinda Sánchez is a 54 y.o. female who presents with continued anteriorly based right knee pain following right knee replacement about a year ago.  She had a lot of issues with pain control postoperatively.  She did some home therapy but no significant outpatient therapy.  At this time she is having satisfactory range of motion and mobility overall but having a lot of infrapatellar pain specially getting up from a chair and on stairs.  She notices clicking but this is not necessarily painful.  She continues with pain management for chronic pain control.  And will use meloxicam on as needed basis        Past History  Past Medical History:   Diagnosis Date    Chronic pain     Colitis     DDD (degenerative disc disease), lumbar     Failed back surgical syndrome     Fibromyalgia     Mood disorder (HCC)     Panic attacks     Primary insomnia     Seasonal allergies      Past Surgical History:   Procedure Laterality Date    BACK SURGERY      SPINAL FUSION    COLONOSCOPY      ENDOMETRIAL ABLATION      ESOPHAGOGASTRODUODENOSCOPY      FOOT SURGERY Right     JOINT REPLACEMENT      KNEE ARTHROSCOPY Right 6/13/2023    RIGHT KNEE ARTHROSCOPY, MEDIAL MENISCUS DEBRIDEMENT performed by Jose Carlos Wharton MD at [facility] OR    TOTAL KNEE ARTHROPLASTY Right 2/8/2024    RIGHT TOTAL KNEE ARTHROPLASTY performed by Arnulfo Diop MD at [facility] OR    URETHRA SURGERY       Social History     Tobacco Use    Smoking status: Never     Passive exposure: Never    Smokeless tobacco: Never   Substance Use Topics    Alcohol use: No      Current Outpatient Medications on File Prior to Visit   Medication Sig Dispense Refill    pregabalin (LYRICA) 150 MG capsule Take 1

## 2025-03-03 ENCOUNTER — OFFICE VISIT (OUTPATIENT)
Dept: PAIN MANAGEMENT | Age: 55
End: 2025-03-03
Payer: COMMERCIAL

## 2025-03-03 VITALS
DIASTOLIC BLOOD PRESSURE: 82 MMHG | BODY MASS INDEX: 34.61 KG/M2 | HEART RATE: 92 BPM | WEIGHT: 221 LBS | SYSTOLIC BLOOD PRESSURE: 135 MMHG | OXYGEN SATURATION: 98 %

## 2025-03-03 DIAGNOSIS — M70.62 TROCHANTERIC BURSITIS OF BOTH HIPS: ICD-10-CM

## 2025-03-03 DIAGNOSIS — M16.11 PRIMARY OSTEOARTHRITIS OF RIGHT HIP: ICD-10-CM

## 2025-03-03 DIAGNOSIS — M51.362 DEGENERATION OF INTERVERTEBRAL DISC OF LUMBAR REGION WITH DISCOGENIC BACK PAIN AND LOWER EXTREMITY PAIN: ICD-10-CM

## 2025-03-03 DIAGNOSIS — R10.84 GENERALIZED ABDOMINAL PAIN: ICD-10-CM

## 2025-03-03 DIAGNOSIS — M54.16 LUMBAR RADICULOPATHY: ICD-10-CM

## 2025-03-03 DIAGNOSIS — M96.1 FAILED BACK SURGICAL SYNDROME: ICD-10-CM

## 2025-03-03 DIAGNOSIS — Z51.81 ENCOUNTER FOR THERAPEUTIC DRUG MONITORING: Primary | ICD-10-CM

## 2025-03-03 DIAGNOSIS — M17.11 PRIMARY OSTEOARTHRITIS OF RIGHT KNEE: ICD-10-CM

## 2025-03-03 DIAGNOSIS — M79.18 MYOFASCIAL PAIN: ICD-10-CM

## 2025-03-03 DIAGNOSIS — F39 MOOD DISORDER: ICD-10-CM

## 2025-03-03 DIAGNOSIS — M47.816 FACET SYNDROME, LUMBAR: ICD-10-CM

## 2025-03-03 DIAGNOSIS — G89.4 CHRONIC PAIN SYNDROME: ICD-10-CM

## 2025-03-03 DIAGNOSIS — M70.61 TROCHANTERIC BURSITIS OF BOTH HIPS: ICD-10-CM

## 2025-03-03 DIAGNOSIS — F51.01 PRIMARY INSOMNIA: ICD-10-CM

## 2025-03-03 DIAGNOSIS — M79.7 FIBROMYALGIA: ICD-10-CM

## 2025-03-03 PROCEDURE — 99213 OFFICE O/P EST LOW 20 MIN: CPT | Performed by: INTERNAL MEDICINE

## 2025-03-03 RX ORDER — MELOXICAM 15 MG/1
15 TABLET ORAL DAILY
Qty: 30 TABLET | Refills: 1 | Status: SHIPPED | OUTPATIENT
Start: 2025-03-03

## 2025-03-03 RX ORDER — HYDROCODONE BITARTRATE AND ACETAMINOPHEN 7.5; 325 MG/1; MG/1
1 TABLET ORAL 3 TIMES DAILY PRN
Qty: 105 TABLET | Refills: 0 | Status: SHIPPED | OUTPATIENT
Start: 2025-03-03 | End: 2025-04-07

## 2025-03-03 NOTE — PROGRESS NOTES
Dorinda Sánchez  1970  [id number]    HISTORY OF PRESENT ILLNESS:  Ms. Sánchez is a 54 y.o. female returns for a follow up visit for multiple medical problems.  Her  presenting problems are   1. Chronic pain syndrome    2. Myofascial pain    3. Lumbar radiculopathy    4. Fibromyalgia    5. Primary insomnia    6. Primary osteoarthritis of right knee    7. Primary osteoarthritis of right hip    8. Mood disorder    9. Degeneration of intervertebral disc of lumbar region with discogenic back pain and lower extremity pain    10. Facet syndrome, lumbar    11. Trochanteric bursitis of both hips    .    As per information/history obtained from the PADT(patient assessment and documentation tool) -  She complains of pain in the lower back and knees Right with radiation to the buttocks She rates the pain 5/10 and describes it as aching.  Pain is made worse by: movement.  Current treatment regimen has helped relieve about 50% of the pain.  She denies side effects from the current pain regimen.   Patient reports that since last follow up visit the physical functioning is worse, family/social relationships are unchanged, mood is worse sleep patterns are worse.  Ms. Sánchez states that since starting the treatment with the current regimen the  overall functioning  in the above aspects is  better,Patient denies neurological bowel or bladder. Patient denies misusing/abusing her narcotic pain medications or using any illegal drugs.  There are No indicators for possible drug abuse, addiction or diversion problems.     Upon obtaining the medical history from Ms. Sánchez regarding today's office visit for her presenting problems, patient complains her back and knee have been hurting. She states it feels like her trigger finger is coming back. She mentions she saw the spinal surgeon who is wanting to do another surgery, a fusion. She reports her back feels squishy and weak. She says her whole body is achy.     ALLERGIES/PAST MED/FAM/SOC

## 2025-03-06 LAB
1,3 CHLOROPHENYL PIPERAZINE, URINE: NOT DETECTED
6-MONOACETYLMORPHINE: NEGATIVE NG/ML
7-AMINOCLONAZEPAM/CREATININE URINE: NOT DETECTED NG/MG CREAT
8-HYDROXYAMOXAPINE, URINE: NOT DETECTED
8-HYDROXYLOXAPINE, URINE: NOT DETECTED
ACETAMINOPHEN, URINE: NORMAL UG/ML
ACETAMINOPHEN, URINE: PRESENT
ALPHA HYDROXYALPRAZOLAM/CREATININE URINE: NOT DETECTED NG/MG CREAT
ALPHA HYDROXYTRIAZOLAM/CREAT UR CFM: NOT DETECTED NG/MG CREAT
ALPHA-HYDROXYMIDAZOLAM, URINE: NOT DETECTED NG/MG CREAT
ALPRAZOLAM/CREATININE URINE: NOT DETECTED NG/MG CREAT
AMINO CHLOROPYRIDINE, URINE: NOT DETECTED
AMITRIPTYLINE: NOT DETECTED
AMOXAPINE, URINE: NOT DETECTED
AMPHETAMINE SCREEN URINE: NEGATIVE NG/ML
ANALGESICS SCREEN URINE: NORMAL
ANTIDEPRESSANTS SCREEN URINE: NORMAL
ANTIPSYCHOTICS SCREEN URINE: NEGATIVE
ARIPIPRAZOLE, URINE: NOT DETECTED
ASENAPINE, URINE: NOT DETECTED
BACLOFEN, URINE: NOT DETECTED
BARBITURATE SCREEN URINE: NEGATIVE NG/ML
BENZODIAZEPINE SCREEN, URINE: NEGATIVE
BUPRENORPHINE URINE: NEGATIVE
BUPRENORPHINE/CREATININE URINE: NOT DETECTED NG/MG CREAT
BUPROPION, URINE: NOT DETECTED
CANNABINOID SCREEN URINE: NEGATIVE NG/ML
CARBAMAZEPINE, URINE: NOT DETECTED
CARISOPRODOL, UR: NOT DETECTED
CHLORPROMAZINE, URINE: NOT DETECTED
CITALOPRAM, URINE: NOT DETECTED
CLOBAZAM, URINE: NOT DETECTED
CLOMIPRAMINE, URINE: NOT DETECTED
CLONAZEPAM/CREATININE URINE: NOT DETECTED NG/MG CREAT
CLOZAPINE, URINE: NOT DETECTED
COCAINE METABOLITE SCREEN URINE: NEGATIVE NG/ML
CODEINE, URINE: NOT DETECTED NG/MG CREAT
CREATININE URINE: 44 MG/DL
CYCLOBENZAPRINE, URINE: NOT DETECTED
DESALKYLFLURAZEPAM/CREATININE URINE: NOT DETECTED NG/MG CREAT
DESIPRAMINE: NOT DETECTED
DESMETHYLCITALOPRAM, URINE: NOT DETECTED
DESMETHYLCLOMIPRAMINE, URINE: NOT DETECTED
DESMETHYLCLOZAPINE, URINE: NOT DETECTED
DESMETHYLCYCLOBENZAPRINE, URINE: NOT DETECTED
DESMETHYLDOXEPIN, URINE: NOT DETECTED
DESMETHYLFLUNITRAZEPAM, URINE: NOT DETECTED NG/MG CREAT
DESMETHYLSERTRALINE, URINE: NOT DETECTED
DESMETHYLVENLAFAXINE, URINE: NOT DETECTED
DIAZEPAM/CREATININE URINE: NOT DETECTED NG/MG CREAT
DIHYDROCODEINE/CREATININE URINE: NOT DETECTED NG/MG CREAT
DOXEPIN, URINE: NOT DETECTED
DULOXETINE, URINE: PRESENT
EZOGABINE, URINE: NOT DETECTED
FENTANYL / ANALOGUES SCREEN URINE: NEGATIVE
FENTANYL/CREATININE URINE: NOT DETECTED NG/MG CREAT
FLUNITRAZEPAM, URINE: NOT DETECTED NG/MG CREAT
FLUOXETINE, URINE: NOT DETECTED
FLUPHENAZINE, URINE: NOT DETECTED
FLUVOXAMINE, URINE: NOT DETECTED
GABAPENTIN: NOT DETECTED
HALOPERIDOL, URINE: NOT DETECTED
HYDROCODONE GC/MS CONF: 150 NG/MG CREAT
HYDROMORPHONE GC/MS CONF: NOT DETECTED NG/MG CREAT
HYDROXYBUPROPION, URINE: NOT DETECTED
ILOPERIDONE, URINE: NOT DETECTED
IMIPRAMINE, URINE: NOT DETECTED
KETAMINE, URINE: NOT DETECTED
LABORATORY REPORT: NORMAL
LAMOTRIGINE, URINE: NOT DETECTED
LEVETIRACETAM, URINE: NOT DETECTED
LORAZEPAM/CREATININE URINE: NOT DETECTED NG/MG CREAT
LOXAPINE, URINE: NOT DETECTED
LURASIDONE, URINE: NOT DETECTED
MAPROTILINE, URINE: NOT DETECTED
MEPERIDINE: NEGATIVE NG/ML
MEPROBAMATE, URINE: NOT DETECTED
MESORIDAZINE, URINE: NOT DETECTED
METAXALONE, URINE: NOT DETECTED
METHADONE AND METABOLITES, URINE: NEGATIVE NG/ML
METHADONE SCREEN, URINE: NEGATIVE NG/ML
METHOCARBAMOL, URINE: NOT DETECTED
METHYLPHENIDATE, URINE: NOT DETECTED
MIDAZOLAM/CREATININE URINE: NOT DETECTED NG/MG CREAT
MIRTAZAPINE, URINE: NOT DETECTED
MOLINDONE, URINE: NOT DETECTED
MORPHINE GC/MS CONF: NOT DETECTED NG/MG CREAT
MUSCLE RELAXANTS SCREEN URINE: NEGATIVE
NEFAZODONE, URINE: NOT DETECTED
NORBUPRENORPHINE/CREATININE URINE: NOT DETECTED NG/MG CREAT
NORCODEINE/CREATININE URINE: NOT DETECTED NG/MG CREAT
NORDIAZEPAM/CREATININE URINE: NOT DETECTED NG/MG CREAT
NORFENTANYL/CREATININE URINE: NOT DETECTED NG/MG CREAT
NORFLUOXETINE, URINE: NOT DETECTED
NORHYDROCODONE (LCMSMS): 480 NG/MG CREAT
NORKETAMINE, URINE: NOT DETECTED
NORMORPHINE URINE: NOT DETECTED NG/MG CREAT
NORTRIPTYLINE: PRESENT
OLANZAPINE, URINE: NOT DETECTED
OPIATE SCREEN URINE: NORMAL NG/ML
OPIATES: NORMAL
ORPHENADRINE, URINE: NOT DETECTED
OTHER HALLUCINOGENS SCREEN URINE: NEGATIVE
OXAZEPAM/CREATININE URINE: NOT DETECTED NG/MG CREAT
OXCARBAZEPINE MHD, URINE: NOT DETECTED
OXYCODONE SCREEN URINE: NEGATIVE NG/ML
PAROXETINE, URINE: NOT DETECTED
PERPHENAZINE, URINE: NOT DETECTED
PHENCYCLIDINE SCREEN URINE: NEGATIVE NG/ML
PHENYTOIN, URINE: NOT DETECTED
PIMOZIDE, URINE: NOT DETECTED
PREGABALIN, URINE: PRESENT
PREGABALIN: NORMAL
PRESCRIBED MEDICATIONS: NORMAL
PRIMIDONE, URINE: NOT DETECTED
PROCHLORPERAZINE, URINE: NOT DETECTED
PROPOXYPHENE: NEGATIVE NG/ML
PROTRIPTYLINE, URINE: NOT DETECTED
QUETIAPINE, URINE: NOT DETECTED
RISPERIDONE, URINE: NOT DETECTED
RITALINIC ACID, UR: NOT DETECTED
RUFINAMIDE, URINE: NOT DETECTED
SEDATIVES/HYPNOTICS SCREEN URINE: NEGATIVE
SERTRALINE, URINE: NOT DETECTED
SYMPATHOMIMETICS SCREEN URINE: NEGATIVE
TAPENTADOL SCREEN URINE: NEGATIVE NG/ML
TEMAZEPAM/CREATININE URINE: NOT DETECTED NG/MG CREAT
THIORIDAZINE, URINE: NOT DETECTED
THIOTHIXENE, URINE: NOT DETECTED
TIAGABINE, URINE: NOT DETECTED
TIZANIDINE, URINE: NOT DETECTED
TOPIRAMATE, URINE: NOT DETECTED
TRAMADOL SCREEN URINE: NEGATIVE NG/ML
TRAZODONE, URINE: NOT DETECTED
TRIFLUOPERAZINE, URINE: NOT DETECTED
TRIMIPRAMINE, URINE: NOT DETECTED
VENLAFAXINE, URINE: NOT DETECTED
VILAZODONE, URINE: NOT DETECTED
ZALEPLON, URINE: NOT DETECTED
ZIPRASIDONE, URINE: NOT DETECTED
ZOLPIDEM ACID, URINE: NOT DETECTED
ZOLPIDEM, URINE: NOT DETECTED
ZONISAMIDE, URINE: NOT DETECTED
ZOPICLONE/ESZOPICLONE, URINE: NOT DETECTED

## 2025-03-29 DIAGNOSIS — G89.4 CHRONIC PAIN SYNDROME: ICD-10-CM

## 2025-03-29 DIAGNOSIS — F51.01 PRIMARY INSOMNIA: ICD-10-CM

## 2025-03-31 RX ORDER — NORTRIPTYLINE HYDROCHLORIDE 25 MG/1
CAPSULE ORAL
Qty: 60 CAPSULE | Refills: 1 | OUTPATIENT
Start: 2025-03-31

## 2025-04-07 ENCOUNTER — OFFICE VISIT (OUTPATIENT)
Dept: PAIN MANAGEMENT | Age: 55
End: 2025-04-07
Payer: COMMERCIAL

## 2025-04-07 VITALS
DIASTOLIC BLOOD PRESSURE: 74 MMHG | WEIGHT: 208 LBS | BODY MASS INDEX: 32.58 KG/M2 | OXYGEN SATURATION: 98 % | HEART RATE: 89 BPM | SYSTOLIC BLOOD PRESSURE: 108 MMHG

## 2025-04-07 DIAGNOSIS — M54.16 LUMBAR RADICULOPATHY: ICD-10-CM

## 2025-04-07 DIAGNOSIS — M70.62 TROCHANTERIC BURSITIS OF BOTH HIPS: ICD-10-CM

## 2025-04-07 DIAGNOSIS — M96.1 FAILED BACK SURGICAL SYNDROME: ICD-10-CM

## 2025-04-07 DIAGNOSIS — M47.816 FACET SYNDROME, LUMBAR: ICD-10-CM

## 2025-04-07 DIAGNOSIS — G89.4 CHRONIC PAIN SYNDROME: ICD-10-CM

## 2025-04-07 DIAGNOSIS — M51.362 DEGENERATION OF INTERVERTEBRAL DISC OF LUMBAR REGION WITH DISCOGENIC BACK PAIN AND LOWER EXTREMITY PAIN: ICD-10-CM

## 2025-04-07 DIAGNOSIS — R10.84 GENERALIZED ABDOMINAL PAIN: ICD-10-CM

## 2025-04-07 DIAGNOSIS — M17.11 PRIMARY OSTEOARTHRITIS OF RIGHT KNEE: ICD-10-CM

## 2025-04-07 DIAGNOSIS — M70.61 TROCHANTERIC BURSITIS OF BOTH HIPS: ICD-10-CM

## 2025-04-07 DIAGNOSIS — M79.18 MYOFASCIAL PAIN: ICD-10-CM

## 2025-04-07 DIAGNOSIS — M79.7 FIBROMYALGIA: ICD-10-CM

## 2025-04-07 PROCEDURE — 99213 OFFICE O/P EST LOW 20 MIN: CPT | Performed by: INTERNAL MEDICINE

## 2025-04-07 RX ORDER — LIDOCAINE 36 MG/1
PATCH TOPICAL
Qty: 60 PATCH | Refills: 0 | Status: SHIPPED | OUTPATIENT
Start: 2025-04-07

## 2025-04-07 RX ORDER — PREGABALIN 150 MG/1
150 CAPSULE ORAL 2 TIMES DAILY
Qty: 60 CAPSULE | Refills: 1 | Status: SHIPPED | OUTPATIENT
Start: 2025-04-07 | End: 2025-06-06

## 2025-04-07 RX ORDER — HYDROCODONE BITARTRATE AND ACETAMINOPHEN 7.5; 325 MG/1; MG/1
1 TABLET ORAL 3 TIMES DAILY PRN
Qty: 105 TABLET | Refills: 0 | Status: SHIPPED | OUTPATIENT
Start: 2025-04-07 | End: 2025-05-12

## 2025-04-07 RX ORDER — NORTRIPTYLINE HYDROCHLORIDE 25 MG/1
25-50 CAPSULE ORAL NIGHTLY
Qty: 60 CAPSULE | Refills: 1 | Status: SHIPPED | OUTPATIENT
Start: 2025-04-07

## 2025-04-07 RX ORDER — MELOXICAM 15 MG/1
15 TABLET ORAL DAILY
Qty: 30 TABLET | Refills: 1 | Status: SHIPPED | OUTPATIENT
Start: 2025-04-07

## 2025-04-07 RX ORDER — DULOXETIN HYDROCHLORIDE 60 MG/1
60 CAPSULE, DELAYED RELEASE ORAL DAILY
Qty: 30 CAPSULE | Refills: 1 | Status: SHIPPED | OUTPATIENT
Start: 2025-04-07

## 2025-04-07 NOTE — PROGRESS NOTES
Dorinda Sánchez  1970  [id number]      HISTORY OF PRESENT ILLNESS:  Ms. Sánchez is a 54 y.o. female returns for a follow up visit for pain management  She has a diagnosis of   1. Chronic pain syndrome    2. Trochanteric bursitis of both hips    3. Primary insomnia    4. Primary osteoarthritis of right knee    5. Myofascial pain    6. Mood disorder    7. Lumbar radiculopathy    8. Fibromyalgia    9. Facet syndrome, lumbar    10. Degeneration of intervertebral disc of lumbar region with discogenic back pain and lower extremity pain    11. Failed back surgical syndrome    12. Generalized abdominal pain    .      As per information/history obtained from the PADT(patient assessment and documentation tool) -  She complains of pain in the hands Right, lower back, and knees Right She rates the pain 6/10 and describes it as aching.  Pain is made worse by: movement, walking. She denies any side effects from the current pain regimen. Patient reports that since last follow up visit the physical functioning is worse, family/social relationships are unchanged, mood is unchanged sleep patterns are unchanged. Ms. Sánchez states that since starting the treatment with the current regimen the  overall functioning  in the above aspects is  better, Patient denies misusing/abusing her narcotic pain medications or using any illegal drugs.  There are No indicators for possible drug abuse, addiction or diversion problems.   Upon obtaining the medical history from Ms. Sánchez regarding today's office visit for her presenting problems, patient states she has been doing fair. Ms. Sánchez she is going for trigger finger surgery on the right hand, 2 middle fingers. Patient states her back is still hurting. She reports she is working full time as a . Patient reports she lost 23 pounds.       ALLERGIES: Patients list of allergies were reviewed     MEDICATIONS: Ms. Sánchez list of medications were reviewed.Her current medications are

## 2025-04-14 ENCOUNTER — TELEPHONE (OUTPATIENT)
Dept: PAIN MANAGEMENT | Age: 55
End: 2025-04-14

## 2025-04-14 NOTE — TELEPHONE ENCOUNTER
The medication was DENIED; DENIAL letter is uploaded to [employer].    Generic Denial: Drug is not covered for off label usage.  This drug is FDA approved for : PHN (Pain After Shingles)    If you want an APPEAL; please note in this encounter what new information you would like to APPEAL with.  Once complete route back to [facility].    Thank you please advise patient.

## 2025-04-14 NOTE — TELEPHONE ENCOUNTER
Submitted PA for [facility]: [id number] STATUS: PENDING.    Follow up done daily; if no decision with in three days we will refax.  If another three days goes by with no decision will call the insurance for status.

## 2025-05-12 ENCOUNTER — OFFICE VISIT (OUTPATIENT)
Dept: PAIN MANAGEMENT | Age: 55
End: 2025-05-12
Payer: COMMERCIAL

## 2025-05-12 VITALS
TEMPERATURE: 96.8 F | OXYGEN SATURATION: 97 % | HEART RATE: 80 BPM | SYSTOLIC BLOOD PRESSURE: 110 MMHG | DIASTOLIC BLOOD PRESSURE: 78 MMHG

## 2025-05-12 DIAGNOSIS — M17.11 PRIMARY OSTEOARTHRITIS OF RIGHT KNEE: ICD-10-CM

## 2025-05-12 DIAGNOSIS — F39 MOOD DISORDER: ICD-10-CM

## 2025-05-12 DIAGNOSIS — M70.61 TROCHANTERIC BURSITIS OF BOTH HIPS: ICD-10-CM

## 2025-05-12 DIAGNOSIS — M79.7 FIBROMYALGIA: ICD-10-CM

## 2025-05-12 DIAGNOSIS — F51.01 PRIMARY INSOMNIA: ICD-10-CM

## 2025-05-12 DIAGNOSIS — M96.1 FAILED BACK SURGICAL SYNDROME: ICD-10-CM

## 2025-05-12 DIAGNOSIS — R10.84 GENERALIZED ABDOMINAL PAIN: ICD-10-CM

## 2025-05-12 DIAGNOSIS — M70.62 TROCHANTERIC BURSITIS OF BOTH HIPS: ICD-10-CM

## 2025-05-12 DIAGNOSIS — M16.11 PRIMARY OSTEOARTHRITIS OF RIGHT HIP: ICD-10-CM

## 2025-05-12 DIAGNOSIS — M54.16 LUMBAR RADICULOPATHY: ICD-10-CM

## 2025-05-12 DIAGNOSIS — M47.816 FACET SYNDROME, LUMBAR: ICD-10-CM

## 2025-05-12 DIAGNOSIS — M51.362 DEGENERATION OF INTERVERTEBRAL DISC OF LUMBAR REGION WITH DISCOGENIC BACK PAIN AND LOWER EXTREMITY PAIN: ICD-10-CM

## 2025-05-12 DIAGNOSIS — M79.18 MYOFASCIAL PAIN: ICD-10-CM

## 2025-05-12 DIAGNOSIS — G89.4 CHRONIC PAIN SYNDROME: ICD-10-CM

## 2025-05-12 PROCEDURE — 99214 OFFICE O/P EST MOD 30 MIN: CPT | Performed by: INTERNAL MEDICINE

## 2025-05-12 RX ORDER — LIDOCAINE 36 MG/1
PATCH TOPICAL
Qty: 60 PATCH | Refills: 1 | Status: SHIPPED | OUTPATIENT
Start: 2025-05-12

## 2025-05-12 RX ORDER — MELOXICAM 15 MG/1
15 TABLET ORAL DAILY
Qty: 30 TABLET | Refills: 0 | Status: SHIPPED | OUTPATIENT
Start: 2025-05-12

## 2025-05-12 RX ORDER — HYDROCODONE BITARTRATE AND ACETAMINOPHEN 7.5; 325 MG/1; MG/1
1 TABLET ORAL 3 TIMES DAILY PRN
Qty: 105 TABLET | Refills: 0 | Status: SHIPPED | OUTPATIENT
Start: 2025-05-12 | End: 2025-06-16

## 2025-05-12 RX ORDER — PREGABALIN 150 MG/1
150 CAPSULE ORAL 2 TIMES DAILY
Qty: 60 CAPSULE | Refills: 1 | Status: SHIPPED | OUTPATIENT
Start: 2025-05-12 | End: 2025-07-11

## 2025-05-12 RX ORDER — NORTRIPTYLINE HYDROCHLORIDE 25 MG/1
25-50 CAPSULE ORAL NIGHTLY
Qty: 60 CAPSULE | Refills: 1 | Status: SHIPPED | OUTPATIENT
Start: 2025-05-12

## 2025-05-12 RX ORDER — DULOXETIN HYDROCHLORIDE 60 MG/1
60 CAPSULE, DELAYED RELEASE ORAL DAILY
Qty: 30 CAPSULE | Refills: 1 | Status: SHIPPED | OUTPATIENT
Start: 2025-05-12

## 2025-05-12 NOTE — PROGRESS NOTES
Take 1 tablet by mouth daily 30 tablet 1    DULoxetine (CYMBALTA) 60 MG extended release capsule Take 1 capsule by mouth daily 30 capsule 1    nortriptyline (PAMELOR) 25 MG capsule Take 1-2 capsules by mouth nightly 60 capsule 1    pregabalin (LYRICA) 150 MG capsule Take 1 capsule by mouth 2 times daily for 60 days. 60 capsule 1    Lidocaine (ZTLIDO) 1.8 % PTCH Apply 1 patch daily every 12 hours on 12 hours off 60 patch 0     No facility-administered medications prior to visit.       REVIEW OF SYSTEMS: .   Respiratory: Negative for shortness of breath.    Cardiovascular: Negative for chest pain, palpitations  Gastrointestinal: Negative for blood in stool, abdominal distention, nausea, vomiting, abdominal pain, diarrhea,constipation.  Neurological: Negative for speech difficulty, weakness and light-headedness, dizziness, tremors, sleepiness  Psychiatric/Behavioral: Negative for suicidal ideas, hallucinations, behavioral problems, self-injury, decreased concentration/cognition, agitation, confusion.     PHYSICAL EXAM:   Nursing note and vitals reviewed. /78 (BP Site: Right Upper Arm, Patient Position: Sitting)   Pulse 80   Temp 96.8 °F (36 °C) (Temporal)   LMP 05/05/2018   SpO2 97%   General Appearance: Patient is well nourished, well developed, well groomed and in no acute distress.  Skin: Skin is warm and dry, good turgor . No rash or lesions noted. She is not diaphoretic.     Pulmonary/Chest: Effort normal. No respiratory distress or use of accessory muscles. Auscultation revealing normal air entry. She does not have wheezes in the lung fields. She has no rales.   Cardiovascular: Normal rate, regular rhythm, normal heart sounds, and does not have murmur.  Exam reveals no gallop and no friction rub.    Musculoskeletal / Extremities: Range of motion is normal. Gait is normal, assistive devices use: none.    She exhibits edema: none, and no tenderness.   Neurological/Psychiatric:She is alert and oriented to

## 2025-06-07 DIAGNOSIS — M79.18 MYOFASCIAL PAIN: ICD-10-CM

## 2025-06-07 DIAGNOSIS — G89.4 CHRONIC PAIN SYNDROME: ICD-10-CM

## 2025-06-07 DIAGNOSIS — M70.62 TROCHANTERIC BURSITIS OF BOTH HIPS: ICD-10-CM

## 2025-06-07 DIAGNOSIS — M70.61 TROCHANTERIC BURSITIS OF BOTH HIPS: ICD-10-CM

## 2025-06-16 ENCOUNTER — OFFICE VISIT (OUTPATIENT)
Dept: PAIN MANAGEMENT | Age: 55
End: 2025-06-16
Payer: COMMERCIAL

## 2025-06-16 VITALS
BODY MASS INDEX: 29.44 KG/M2 | DIASTOLIC BLOOD PRESSURE: 72 MMHG | SYSTOLIC BLOOD PRESSURE: 118 MMHG | WEIGHT: 188 LBS | HEART RATE: 74 BPM

## 2025-06-16 DIAGNOSIS — M79.18 MYOFASCIAL PAIN: ICD-10-CM

## 2025-06-16 DIAGNOSIS — M17.11 PRIMARY OSTEOARTHRITIS OF RIGHT KNEE: ICD-10-CM

## 2025-06-16 DIAGNOSIS — M54.16 LUMBAR RADICULOPATHY: ICD-10-CM

## 2025-06-16 DIAGNOSIS — M96.1 FAILED BACK SURGICAL SYNDROME: ICD-10-CM

## 2025-06-16 DIAGNOSIS — M51.362 DEGENERATION OF INTERVERTEBRAL DISC OF LUMBAR REGION WITH DISCOGENIC BACK PAIN AND LOWER EXTREMITY PAIN: ICD-10-CM

## 2025-06-16 DIAGNOSIS — M70.62 TROCHANTERIC BURSITIS OF BOTH HIPS: ICD-10-CM

## 2025-06-16 DIAGNOSIS — M70.61 TROCHANTERIC BURSITIS OF BOTH HIPS: ICD-10-CM

## 2025-06-16 DIAGNOSIS — G89.4 CHRONIC PAIN SYNDROME: ICD-10-CM

## 2025-06-16 DIAGNOSIS — M47.816 FACET SYNDROME, LUMBAR: ICD-10-CM

## 2025-06-16 DIAGNOSIS — R10.84 GENERALIZED ABDOMINAL PAIN: ICD-10-CM

## 2025-06-16 PROCEDURE — 99213 OFFICE O/P EST LOW 20 MIN: CPT | Performed by: INTERNAL MEDICINE

## 2025-06-16 RX ORDER — HYDROCODONE BITARTRATE AND ACETAMINOPHEN 7.5; 325 MG/1; MG/1
1 TABLET ORAL 3 TIMES DAILY PRN
Qty: 105 TABLET | Refills: 0 | Status: SHIPPED | OUTPATIENT
Start: 2025-06-16 | End: 2025-07-21

## 2025-06-16 RX ORDER — MELOXICAM 15 MG/1
15 TABLET ORAL DAILY
Qty: 30 TABLET | Refills: 0 | OUTPATIENT
Start: 2025-06-16

## 2025-06-16 RX ORDER — MELOXICAM 15 MG/1
15 TABLET ORAL DAILY
Qty: 30 TABLET | Refills: 1 | Status: SHIPPED | OUTPATIENT
Start: 2025-06-16

## 2025-06-16 NOTE — PROGRESS NOTES
Dorinda Sánchez  1970  [id number]      HISTORY OF PRESENT ILLNESS:  Ms. Sánchez is a 54 y.o. female returns for a follow up visit for pain management  She has a diagnosis of   1. Chronic pain syndrome    2. Lumbar radiculopathy    3. Generalized abdominal pain    4. Myofascial pain    5. Facet syndrome, lumbar    6. Primary insomnia    7. Trochanteric bursitis of both hips    8. Degeneration of intervertebral disc of lumbar region with discogenic back pain and lower extremity pain    9. Mood disorder    10. Primary osteoarthritis of right knee    11. Failed back surgical syndrome    .      As per information/history obtained from the PADT(patient assessment and documentation tool) -  She complains of pain in the hands Right and lower back with radiation to the buttocks, hips Right, upper leg Right, and knees Right She rates the pain 5/10 and describes it as aching, burning.  Pain is made worse by: movement, walking, standing, bending, lifting. She denies any side effects from the current pain regimen. Patient reports that since last follow up visit the physical functioning is worse, family/social relationships are unchanged, mood is worse sleep patterns are unchanged. Ms. Sánchez states that since starting the treatment with the current regimen the  overall functioning  in the above aspects is  better, Patient denies misusing/abusing her narcotic pain medications or using any illegal drugs.  There are No indicators for possible drug abuse, addiction or diversion problems.   Upon obtaining the medical history from Ms. Sánchez regarding today's office visit for her presenting problems, patient states she has been doing fair, she is managing with the medications. Ms. Sánchez mentions she is using Norco 3 per day along with the other adjuvants. Patient denies any constipation symptoms. She states her son got  recently. Patient complains of her knee and back hurting more along with increase pain on her right

## 2025-07-08 ENCOUNTER — TELEPHONE (OUTPATIENT)
Dept: PAIN MANAGEMENT | Age: 55
End: 2025-07-08

## 2025-07-08 DIAGNOSIS — G89.4 CHRONIC PAIN SYNDROME: ICD-10-CM

## 2025-07-08 DIAGNOSIS — M79.18 MYOFASCIAL PAIN: ICD-10-CM

## 2025-07-08 DIAGNOSIS — F39 MOOD DISORDER: ICD-10-CM

## 2025-07-10 RX ORDER — DULOXETIN HYDROCHLORIDE 60 MG/1
60 CAPSULE, DELAYED RELEASE ORAL DAILY
Qty: 30 CAPSULE | Refills: 1 | OUTPATIENT
Start: 2025-07-10

## 2025-07-17 DIAGNOSIS — F39 MOOD DISORDER: ICD-10-CM

## 2025-07-17 DIAGNOSIS — M79.18 MYOFASCIAL PAIN: ICD-10-CM

## 2025-07-17 DIAGNOSIS — G89.4 CHRONIC PAIN SYNDROME: ICD-10-CM

## 2025-07-17 RX ORDER — DULOXETIN HYDROCHLORIDE 60 MG/1
60 CAPSULE, DELAYED RELEASE ORAL DAILY
Qty: 30 CAPSULE | Refills: 1 | Status: SHIPPED | OUTPATIENT
Start: 2025-07-17

## 2025-07-17 NOTE — TELEPHONE ENCOUNTER
Called and spoke with pt to let her know [facility] sent her Cymbalta 60 mg to [facility],Pt voiced her understanding.

## 2025-07-21 ENCOUNTER — OFFICE VISIT (OUTPATIENT)
Dept: PAIN MANAGEMENT | Age: 55
End: 2025-07-21
Payer: COMMERCIAL

## 2025-07-21 VITALS
WEIGHT: 179 LBS | OXYGEN SATURATION: 98 % | SYSTOLIC BLOOD PRESSURE: 110 MMHG | HEART RATE: 69 BPM | BODY MASS INDEX: 28.04 KG/M2 | DIASTOLIC BLOOD PRESSURE: 68 MMHG

## 2025-07-21 DIAGNOSIS — M79.7 FIBROMYALGIA: ICD-10-CM

## 2025-07-21 DIAGNOSIS — M54.16 LUMBAR RADICULOPATHY: ICD-10-CM

## 2025-07-21 DIAGNOSIS — M17.11 PRIMARY OSTEOARTHRITIS OF RIGHT KNEE: ICD-10-CM

## 2025-07-21 DIAGNOSIS — F39 MOOD DISORDER: ICD-10-CM

## 2025-07-21 DIAGNOSIS — M79.18 MYOFASCIAL PAIN: ICD-10-CM

## 2025-07-21 DIAGNOSIS — F51.01 PRIMARY INSOMNIA: ICD-10-CM

## 2025-07-21 DIAGNOSIS — M96.1 FAILED BACK SURGICAL SYNDROME: ICD-10-CM

## 2025-07-21 DIAGNOSIS — M16.11 PRIMARY OSTEOARTHRITIS OF RIGHT HIP: ICD-10-CM

## 2025-07-21 DIAGNOSIS — M70.62 TROCHANTERIC BURSITIS OF BOTH HIPS: ICD-10-CM

## 2025-07-21 DIAGNOSIS — M70.61 TROCHANTERIC BURSITIS OF BOTH HIPS: ICD-10-CM

## 2025-07-21 DIAGNOSIS — M51.362 DEGENERATION OF INTERVERTEBRAL DISC OF LUMBAR REGION WITH DISCOGENIC BACK PAIN AND LOWER EXTREMITY PAIN: ICD-10-CM

## 2025-07-21 DIAGNOSIS — M47.816 FACET SYNDROME, LUMBAR: ICD-10-CM

## 2025-07-21 DIAGNOSIS — G89.4 CHRONIC PAIN SYNDROME: ICD-10-CM

## 2025-07-21 DIAGNOSIS — R10.84 GENERALIZED ABDOMINAL PAIN: ICD-10-CM

## 2025-07-21 PROCEDURE — 99214 OFFICE O/P EST MOD 30 MIN: CPT | Performed by: INTERNAL MEDICINE

## 2025-07-21 RX ORDER — HYDROCODONE BITARTRATE AND ACETAMINOPHEN 7.5; 325 MG/1; MG/1
1 TABLET ORAL EVERY 6 HOURS PRN
Qty: 120 TABLET | Refills: 0 | Status: SHIPPED | OUTPATIENT
Start: 2025-07-21 | End: 2025-08-20

## 2025-07-21 RX ORDER — LIDOCAINE 36 MG/1
PATCH TOPICAL
Qty: 60 PATCH | Refills: 1 | Status: SHIPPED | OUTPATIENT
Start: 2025-07-21

## 2025-07-21 NOTE — PROGRESS NOTES
Dorinda Sánchez  1970  [id number]    HISTORY OF PRESENT ILLNESS:  Ms. Sánchez is a 54 y.o. female returns for a follow up visit for multiple medical problems.  Her  presenting problems are   1. Chronic pain syndrome    2. Lumbar radiculopathy    3. Failed back surgical syndrome    4. Myofascial pain    5. Degeneration of intervertebral disc of lumbar region with discogenic back pain and lower extremity pain    6. Generalized abdominal pain    7. Primary insomnia    8. Trochanteric bursitis of both hips    9. Mood disorder    10. Facet syndrome, lumbar    11. Primary osteoarthritis of right knee    12. Primary osteoarthritis of right hip    13. Fibromyalgia    .    As per information/history obtained from the PADT(patient assessment and documentation tool) -  She complains of pain in the hands Right and lower back with radiation to the buttocks, hips Right, and knees Right She rates the pain 6/10 and describes it as aching.  Pain is made worse by: movement, walking, standing, sitting, bending, lifting.  Current treatment regimen has helped relieve about 40% of the pain.  She denies side effects from the current pain regimen.   Patient reports that since last follow up visit the physical functioning is unchanged, family/social relationships are unchanged, mood is unchanged sleep patterns are unchanged.  Ms. Sánchez states that since starting the treatment with the current regimen the  overall functioning  in the above aspects is  better,Patient denies neurological bowel or bladder. Patient denies misusing/abusing her narcotic pain medications or using any illegal drugs.  There are No indicators for possible drug abuse, addiction or diversion problems.     Upon obtaining the medical history from Ms. Sánchez regarding today's office visit for her presenting problems, patient states she has been doing fair. Ms. Sánchez states she has been compliant with her medications. She mentions she is using all her adjuvants.

## 2025-08-25 ENCOUNTER — OFFICE VISIT (OUTPATIENT)
Dept: PAIN MANAGEMENT | Age: 55
End: 2025-08-25
Payer: COMMERCIAL

## 2025-08-25 VITALS
BODY MASS INDEX: 27.25 KG/M2 | WEIGHT: 174 LBS | HEART RATE: 89 BPM | DIASTOLIC BLOOD PRESSURE: 79 MMHG | SYSTOLIC BLOOD PRESSURE: 125 MMHG | OXYGEN SATURATION: 98 %

## 2025-08-25 DIAGNOSIS — M47.816 FACET SYNDROME, LUMBAR: ICD-10-CM

## 2025-08-25 DIAGNOSIS — M17.11 PRIMARY OSTEOARTHRITIS OF RIGHT KNEE: ICD-10-CM

## 2025-08-25 DIAGNOSIS — M51.362 DEGENERATION OF INTERVERTEBRAL DISC OF LUMBAR REGION WITH DISCOGENIC BACK PAIN AND LOWER EXTREMITY PAIN: ICD-10-CM

## 2025-08-25 DIAGNOSIS — M79.18 MYOFASCIAL PAIN: ICD-10-CM

## 2025-08-25 DIAGNOSIS — M54.16 LUMBAR RADICULOPATHY: ICD-10-CM

## 2025-08-25 DIAGNOSIS — M70.61 TROCHANTERIC BURSITIS OF BOTH HIPS: ICD-10-CM

## 2025-08-25 DIAGNOSIS — G89.4 CHRONIC PAIN SYNDROME: ICD-10-CM

## 2025-08-25 DIAGNOSIS — M96.1 FAILED BACK SURGICAL SYNDROME: ICD-10-CM

## 2025-08-25 DIAGNOSIS — R10.84 GENERALIZED ABDOMINAL PAIN: ICD-10-CM

## 2025-08-25 DIAGNOSIS — M70.62 TROCHANTERIC BURSITIS OF BOTH HIPS: ICD-10-CM

## 2025-08-25 PROCEDURE — 99213 OFFICE O/P EST LOW 20 MIN: CPT | Performed by: INTERNAL MEDICINE

## 2025-08-25 RX ORDER — NORTRIPTYLINE HYDROCHLORIDE 25 MG/1
25-50 CAPSULE ORAL NIGHTLY
Qty: 60 CAPSULE | Refills: 1 | Status: SHIPPED | OUTPATIENT
Start: 2025-08-25

## 2025-08-25 RX ORDER — HYDROCODONE BITARTRATE AND ACETAMINOPHEN 7.5; 325 MG/1; MG/1
1 TABLET ORAL EVERY 6 HOURS PRN
Qty: 120 TABLET | Refills: 0 | Status: SHIPPED | OUTPATIENT
Start: 2025-08-25 | End: 2025-09-24

## 2025-08-25 RX ORDER — PREGABALIN 150 MG/1
150 CAPSULE ORAL 2 TIMES DAILY
Qty: 60 CAPSULE | Refills: 1 | Status: SHIPPED | OUTPATIENT
Start: 2025-08-25 | End: 2025-10-24

## 2025-08-25 RX ORDER — MELOXICAM 15 MG/1
15 TABLET ORAL DAILY
Qty: 30 TABLET | Refills: 1 | Status: SHIPPED | OUTPATIENT
Start: 2025-08-25

## 2025-08-25 RX ORDER — LIDOCAINE 36 MG/1
PATCH TOPICAL
Qty: 60 PATCH | Refills: 1 | Status: SHIPPED | OUTPATIENT
Start: 2025-08-25

## 2025-08-25 RX ORDER — DULOXETIN HYDROCHLORIDE 60 MG/1
60 CAPSULE, DELAYED RELEASE ORAL DAILY
Qty: 30 CAPSULE | Refills: 1 | Status: SHIPPED | OUTPATIENT
Start: 2025-08-25

## (undated) DEVICE — SUTURE STRATAFIX SPRL SZ 2 0 L14IN ABSRB UD MH L36MM 1 2 CIR SXMD2B401

## (undated) DEVICE — Device

## (undated) DEVICE — GLOVE SURG SZ 75 L12IN FNGR THK79MIL GRN LTX FREE

## (undated) DEVICE — STERILE PATIENT PROTECTIVE PAD FOR IMP® KNEE POSITIONERS & COHESIVE WRAP (10 / CASE): Brand: DE MAYO KNEE POSITIONER®

## (undated) DEVICE — HANDPIECE SET WITH HIGH FLOW TIP AND SUCTION TUBE: Brand: INTERPULSE

## (undated) DEVICE — 3.5 X 48MM HEX HEADED SCREW

## (undated) DEVICE — ZIMMER® A.T.S. CYCLINDRICAL TOURNIQUET CUFF, SINGLE PORT, SINGLE BLADDER 30 IN. 76 CM)

## (undated) DEVICE — 3 BONE CEMENT MIXER: Brand: MIXEVAC

## (undated) DEVICE — 2108 SERIES SAGITTAL BLADE FLARED, GROUND  (18.5 X 1.27 X 90.5MM)

## (undated) DEVICE — GLOVE ORTHO 7 1/2   MSG9475

## (undated) DEVICE — SUTURE STRATAFIX SPRL SZ 3-0 L12IN ABSRB UD FS-1 L30X30CM SXMP2B410

## (undated) DEVICE — SUTURE ABSORBABLE MONOFILAMENT 1 CTX 36 CM 48 MM VIO PDS +

## (undated) DEVICE — OPTIFOAM GENTLE SA, POSTOP, 4X12: Brand: MEDLINE

## (undated) DEVICE — HOOD: Brand: FLYTE

## (undated) DEVICE — GAUZE,SPONGE,4"X4",8PLY,STRL,LF,10/TRAY: Brand: MEDLINE

## (undated) DEVICE — HOOD, PEEL-AWAY: Brand: FLYTE

## (undated) DEVICE — SOLUTION IRRIG 2000ML 0.9% SOD CHL USP UROMATIC PLAS CONT

## (undated) DEVICE — SCREW BNE L25MM DIA2.5MM KNEE FULL THRD HEX FEM PERSONA (NOT IMPLANTED)

## (undated) DEVICE — STERILE PVP: Brand: MEDLINE INDUSTRIES, INC.

## (undated) DEVICE — NEEDLE HYPO 20GA L1.5IN YEL POLYPR HUB S STL REG BVL STR

## (undated) DEVICE — ADHESIVE SKIN CLOSURE WND 8.661X1.5 IN 22 CM LIQUIBAND SECUR